# Patient Record
Sex: MALE | Race: WHITE | NOT HISPANIC OR LATINO | Employment: FULL TIME | URBAN - METROPOLITAN AREA
[De-identification: names, ages, dates, MRNs, and addresses within clinical notes are randomized per-mention and may not be internally consistent; named-entity substitution may affect disease eponyms.]

---

## 2017-01-10 ENCOUNTER — HOSPITAL ENCOUNTER (EMERGENCY)
Facility: HOSPITAL | Age: 42
Discharge: HOME | End: 2017-01-10
Payer: COMMERCIAL

## 2017-01-10 ENCOUNTER — APPOINTMENT (EMERGENCY)
Dept: RADIOLOGY | Facility: HOSPITAL | Age: 42
End: 2017-01-10
Payer: COMMERCIAL

## 2017-01-10 VITALS
DIASTOLIC BLOOD PRESSURE: 72 MMHG | TEMPERATURE: 98.3 F | SYSTOLIC BLOOD PRESSURE: 128 MMHG | HEART RATE: 76 BPM | RESPIRATION RATE: 17 BRPM | OXYGEN SATURATION: 96 %

## 2017-01-10 DIAGNOSIS — J20.9 ACUTE BRONCHITIS: Primary | ICD-10-CM

## 2017-01-10 PROCEDURE — 99283 EMERGENCY DEPT VISIT LOW MDM: CPT

## 2017-01-10 PROCEDURE — 94640 AIRWAY INHALATION TREATMENT: CPT

## 2017-01-10 PROCEDURE — 71020 HB CHEST X-RAY 2VW FRONTAL&LATL: CPT

## 2017-01-10 RX ORDER — FLUTICASONE PROPIONATE 50 MCG
1 SPRAY, SUSPENSION (ML) NASAL DAILY
Qty: 1 BOTTLE | Refills: 0 | Status: SHIPPED | OUTPATIENT
Start: 2017-01-10 | End: 2019-01-02

## 2017-01-10 RX ORDER — PREDNISONE 20 MG/1
40 TABLET ORAL DAILY
Qty: 8 TABLET | Refills: 0 | Status: SHIPPED | OUTPATIENT
Start: 2017-01-10 | End: 2017-02-09

## 2017-01-10 RX ORDER — PREDNISONE 20 MG/1
60 TABLET ORAL ONCE
Status: COMPLETED | OUTPATIENT
Start: 2017-01-10 | End: 2017-01-10

## 2017-01-10 RX ORDER — IPRATROPIUM BROMIDE AND ALBUTEROL SULFATE 2.5; .5 MG/3ML; MG/3ML
3 SOLUTION RESPIRATORY (INHALATION)
Status: DISCONTINUED | OUTPATIENT
Start: 2017-01-10 | End: 2017-01-10 | Stop reason: SDUPTHER

## 2017-01-10 RX ORDER — AZITHROMYCIN 250 MG/1
TABLET, FILM COATED ORAL
Qty: 6 TABLET | Refills: 0 | Status: SHIPPED | OUTPATIENT
Start: 2017-01-10 | End: 2019-01-02

## 2017-01-10 RX ADMIN — IPRATROPIUM BROMIDE 0.5 MG: 0.5 SOLUTION RESPIRATORY (INHALATION) at 21:40

## 2017-01-10 RX ADMIN — ALBUTEROL SULFATE 2.5 MG: 2.5 SOLUTION RESPIRATORY (INHALATION) at 21:40

## 2017-01-10 RX ADMIN — PREDNISONE 60 MG: 20 TABLET ORAL at 21:30

## 2017-04-03 ENCOUNTER — ALLSCRIPTS OFFICE VISIT (OUTPATIENT)
Dept: OTHER | Facility: OTHER | Age: 42
End: 2017-04-03

## 2017-04-18 ENCOUNTER — ALLSCRIPTS OFFICE VISIT (OUTPATIENT)
Dept: OTHER | Facility: OTHER | Age: 42
End: 2017-04-18

## 2017-04-18 DIAGNOSIS — N52.9 MALE ERECTILE DYSFUNCTION: ICD-10-CM

## 2017-04-18 DIAGNOSIS — R07.9 CHEST PAIN: ICD-10-CM

## 2017-04-18 DIAGNOSIS — R35.0 FREQUENCY OF MICTURITION: ICD-10-CM

## 2017-04-18 DIAGNOSIS — Z00.00 ENCOUNTER FOR GENERAL ADULT MEDICAL EXAMINATION WITHOUT ABNORMAL FINDINGS: ICD-10-CM

## 2017-04-27 ENCOUNTER — LAB CONVERSION - ENCOUNTER (OUTPATIENT)
Dept: OTHER | Facility: OTHER | Age: 42
End: 2017-04-27

## 2017-04-27 ENCOUNTER — HOSPITAL ENCOUNTER (OUTPATIENT)
Dept: NON INVASIVE DIAGNOSTICS | Facility: CLINIC | Age: 42
Discharge: HOME | End: 2017-04-27
Payer: COMMERCIAL

## 2017-04-27 ENCOUNTER — GENERIC CONVERSION - ENCOUNTER (OUTPATIENT)
Dept: OTHER | Facility: OTHER | Age: 42
End: 2017-04-27

## 2017-04-27 ENCOUNTER — TRANSCRIBE ORDERS (OUTPATIENT)
Dept: ADMINISTRATIVE | Facility: HOSPITAL | Age: 42
End: 2017-04-27

## 2017-04-27 DIAGNOSIS — R07.9 CHEST PAIN: ICD-10-CM

## 2017-04-27 DIAGNOSIS — R07.9 CHEST PAIN, UNSPECIFIED TYPE: Primary | ICD-10-CM

## 2017-04-27 LAB
A/G RATIO (HISTORICAL): 1.9 (CALC) (ref 1–2.5)
ALBUMIN SERPL BCP-MCNC: 4.6 G/DL (ref 3.6–5.1)
ALP SERPL-CCNC: 56 U/L (ref 40–115)
ALT SERPL W P-5'-P-CCNC: 22 U/L (ref 9–46)
ARRHY DURING EX: NORMAL
AST SERPL W P-5'-P-CCNC: 16 U/L (ref 10–40)
BACTERIA UR QL AUTO: NORMAL /HPF
BASOPHILS # BLD AUTO: 0.4 %
BASOPHILS # BLD AUTO: 22 CELLS/UL (ref 0–200)
BILIRUB SERPL-MCNC: 0.7 MG/DL (ref 0.2–1.2)
BILIRUB UR QL STRIP: NEGATIVE
BUN SERPL-MCNC: 21 MG/DL (ref 7–25)
BUN/CREA RATIO (HISTORICAL): NORMAL (CALC) (ref 6–22)
CALCIUM SERPL-MCNC: 9.7 MG/DL (ref 8.6–10.3)
CHEST PAIN STATEMENT: NORMAL
CHLORIDE SERPL-SCNC: 106 MMOL/L (ref 98–110)
CHOLEST SERPL-MCNC: 250 MG/DL (ref 125–200)
CHOLEST/HDLC SERPL: 7.8 (CALC)
CO2 SERPL-SCNC: 29 MMOL/L (ref 20–31)
COLOR UR: YELLOW
COMMENT (HISTORICAL): CLEAR
CREAT SERPL-MCNC: 1.15 MG/DL (ref 0.6–1.35)
CULTURE RESULT (HISTORICAL): NORMAL
DEPRECATED RDW RBC AUTO: 14.4 % (ref 11–15)
EGFR AFRICAN AMERICAN (HISTORICAL): 90 ML/MIN/1.73M2
EGFR-AMERICAN CALC (HISTORICAL): 78 ML/MIN/1.73M2
EOSINOPHIL # BLD AUTO: 237 CELLS/UL (ref 15–500)
EOSINOPHIL # BLD AUTO: 4.3 %
FECAL OCCULT BLOOD DIAGNOSTIC (HISTORICAL): NEGATIVE
GAMMA GLOBULIN (HISTORICAL): 2.4 G/DL (CALC) (ref 1.9–3.7)
GLUCOSE (HISTORICAL): 97 MG/DL (ref 65–99)
GLUCOSE (HISTORICAL): NEGATIVE
HCT VFR BLD AUTO: 45.7 % (ref 38.5–50)
HDLC SERPL-MCNC: 32 MG/DL
HGB BLD-MCNC: 15.3 G/DL (ref 13.2–17.1)
HYALINE CASTS #/AREA URNS LPF: NORMAL /LPF
KETONES UR STRIP-MCNC: NEGATIVE MG/DL
LDL CHOLESTEROL (HISTORICAL): 166 MG/DL (CALC)
LEUKOCYTE ESTERASE UR QL STRIP: NEGATIVE
LYMPHOCYTES # BLD AUTO: 2167 CELLS/UL (ref 850–3900)
LYMPHOCYTES # BLD AUTO: 39.4 %
MAX DIASTOLIC BP: 54 MMHG
MAX HEART RATE: 151 BPM
MAX PREDICTED HEART RATE: 177 BPM
MAX. SYSTOLIC BP: 206 MMHG
MCH RBC QN AUTO: 31.7 PG (ref 27–33)
MCHC RBC AUTO-ENTMCNC: 33.5 G/DL (ref 32–36)
MCV RBC AUTO: 94.7 FL (ref 80–100)
MONOCYTES # BLD AUTO: 292 CELLS/UL (ref 200–950)
MONOCYTES (HISTORICAL): 5.3 %
NEUTROPHILS # BLD AUTO: 2783 CELLS/UL (ref 1500–7800)
NEUTROPHILS # BLD AUTO: 50.6 %
NITRITE UR QL STRIP: NEGATIVE
NON-HDL-CHOL (CHOL-HDL) (HISTORICAL): 218 MG/DL (CALC)
PH UR STRIP.AUTO: 6 [PH] (ref 5–8)
PLATELET # BLD AUTO: 198 THOUSAND/UL (ref 140–400)
PMV BLD AUTO: 9.3 FL (ref 7.5–12.5)
POTASSIUM SERPL-SCNC: 4.4 MMOL/L (ref 3.5–5.3)
PROSTATE SPECIFIC ANTIGEN FREE (HISTORICAL): 0.1 NG/ML
PROSTATE SPECIFIC ANTIGEN PERCENT FREE (HISTORICAL): 25 % (CALC)
PROSTATE SPECIFIC ANTIGEN TOTAL (HISTORICAL): 0.4 NG/ML
PROT UR STRIP-MCNC: NEGATIVE MG/DL
PROTOCOL NAME: NORMAL
RBC # BLD AUTO: 4.82 MILLION/UL (ref 4.2–5.8)
RBC (HISTORICAL): NORMAL /HPF
REASON FOR TERMINATION: NORMAL
SODIUM SERPL-SCNC: 141 MMOL/L (ref 135–146)
SP GR UR STRIP.AUTO: 1.02 (ref 1–1.03)
SQUAMOUS EPITHELIAL CELLS (HISTORICAL): NORMAL /HPF
TARGET HR FORMULA: NORMAL
TEST INDICATION: NORMAL
TIME IN EXERCISE PHASE: 685 S
TOTAL PROTEIN (HISTORICAL): 7 G/DL (ref 6.1–8.1)
TRIGL SERPL-MCNC: 260 MG/DL
TSH SERPL DL<=0.05 MIU/L-ACNC: 2.54 MIU/L (ref 0.4–4.5)
WBC # BLD AUTO: 5.5 THOUSAND/UL (ref 3.8–10.8)
WBC # BLD AUTO: NORMAL /HPF

## 2017-04-27 PROCEDURE — 93017 CV STRESS TEST TRACING ONLY: CPT

## 2017-05-01 ENCOUNTER — ALLSCRIPTS OFFICE VISIT (OUTPATIENT)
Dept: OTHER | Facility: OTHER | Age: 42
End: 2017-05-01

## 2017-06-01 DIAGNOSIS — E78.5 HYPERLIPIDEMIA: ICD-10-CM

## 2017-08-01 DIAGNOSIS — E78.5 HYPERLIPIDEMIA: ICD-10-CM

## 2018-01-11 NOTE — RESULT NOTES
Verified Results  STRESS TEST ONLY, EXERCISE [id number] 09:50AM Gerardo Bruno Order Number: [id number]    - Patient Instructions: To schedule this appointment, please contact Central Scheduling at [phone]  Test Name Result Flag Reference   STRESS TEST ONLY, EXERCISE (Report)     [facility] [address]   [phone]     Stress Electrocardiography during Exercise     Name: Annie Cullen   MR #: [id number]   Account #: [de-identified]   Study date: 2017   : 1975   Age: 35 years   Gender: Male   Height: 71 in   Weight: 205 lb   BSA: 2 13 m squared     Allergies: PENICILLINS     RN: Uyen Lazo RN   Primary Physician: Mike Walsh MD   Referring Physician: TAI Godinez   Group: [facility]   Report Prepared By[de-identified] Eusebia Joshua   Technician: VINICIO Mcpherson   Technician: Eusebia Joshua   Interpreting Physician: Penelope Kocher , MD     CLINICAL QUESTION: Detection of coronary artery disease  HISTORY: The patient is a 35year old  male  Chest pain status: chest pain  Coronary artery disease risk factors: smoking,sob,asthma,obesity,fatigue  Co-morbidity: obesity  PROCEDURE: Treadmill exercise testing was performed, using the Sourav protocol  Stress electrocardiographic evaluation was performed  SOURAV PROTOCOL:   HR bpm SBP mmHg DBP mmHg Symptoms   Baseline 68 140 80 none   Stage 1 91 150 80 --   Stage 2 102 158 72 --   Stage 3 118 186 80 --   Stage 4 151 206 54 fatigue   Immediate 151 206 54 fatigue   Recovery 1 95 150 60 none   Recovery 2 83 120 70 --   Pre 02 Sat 97% and Peak 97%    No medications or fluids given  STRESS SUMMARY: Duration of exercise was 11 min and 25 sec  The patient exercised to protocol stage 4  Maximal work rate was 13 7 METs  Maximal heart rate during stress was 151 bpm ( 85 % of maximal predicted heart rate)   The rate-pressure product for the peak heart rate and blood pressure was 36529  There was no chest pain during stress  The stress test was terminated due to fatigue  The stress ECG was negative for ischemia  There were no stress arrhythmias or conduction   abnormalities  SUMMARY:   - Stress results: Duration of exercise was 11 min and 25 sec  Target heart rate was achieved  There was no chest pain during stress  - ECG conclusions: The stress ECG was negative for ischemia  IMPRESSIONS: Normal study  Prepared and signed by     Marcelo Rivera MD   Signed 04/27/2017 11:19:26     (1) COMPREHENSIVE METABOLIC PANEL [id number] 18:73EH Irma Bruno Pel     Test Name Result Flag Reference   GLUCOSE 97 mg/dL  65-99   Fasting reference interval   UREA NITROGEN (BUN) 21 mg/dL  7-25   CREATININE 1 15 mg/dL  0 60-1 35   eGFR NON-AFR   AMERICAN 78 mL/min/1 73m2  > OR = 60   eGFR AFRICAN AMERICAN 90 mL/min/1 73m2  > OR = 60   BUN/CREATININE RATIO   5-50   NOT APPLICABLE (calc)   SODIUM 141 mmol/L  135-146   POTASSIUM 4 4 mmol/L  3 5-5 3   CHLORIDE 106 mmol/L     CARBON DIOXIDE 29 mmol/L  20-31   CALCIUM 9 7 mg/dL  8 6-10 3   PROTEIN, TOTAL 7 0 g/dL  6 1-8 1   ALBUMIN 4 6 g/dL  3 6-5 1   GLOBULIN 2 4 g/dL (calc)  1 9-3 7   ALBUMIN/GLOBULIN RATIO 1 9 (calc)  1 0-2 5   BILIRUBIN, TOTAL 0 7 mg/dL  0 2-1 2   ALKALINE PHOSPHATASE 56 U/L     AST 16 U/L  10-40   ALT 22 U/L  9-46     (1) CBC/PLT/DIFF 26Apr2017 06:38AM Irma Bruno Pel     Test Name Result Flag Reference   WHITE BLOOD CELL COUNT 5 5 Thousand/uL  3 8-10 8   RED BLOOD CELL COUNT 4 82 Million/uL  4 20-5 80   HEMOGLOBIN 15 3 g/dL  13 2-17 1   HEMATOCRIT 45 7 %  38 5-50 0   MCV 94 7 fL  80 0-100 0   MCH 31 7 pg  27 0-33 0   MCHC 33 5 g/dL  32 0-36 0   RDW 14 4 %  11 0-15 0   PLATELET COUNT 443 Thousand/uL  140-400   ABSOLUTE NEUTROPHILS 2783 cells/uL  2805-9237   ABSOLUTE LYMPHOCYTES 2167 cells/uL  850-3900   ABSOLUTE MONOCYTES 292 cells/uL  200-950   ABSOLUTE EOSINOPHILS 237 cells/uL     ABSOLUTE BASOPHILS 22 cells/uL  0-200   NEUTROPHILS 50 6 %     LYMPHOCYTES 39 4 %     MONOCYTES 5 3 %     EOSINOPHILS 4 3 %     BASOPHILS 0 4 %     MPV 9 3 fL  7 5-12 5     (1) URINALYSIS w URINE C/S REFLEX (will reflex a microscopy if leukocytes, occult blood, or nitrites are not within normal limits) 26Apr2017 06:38AM Noel Bruno     Test Name Result Flag Reference   SPECIFIC GRAVITY 1 020  1 001-1 035   BILIRUBIN NEGATIVE  NEGATIVE   GLUCOSE NEGATIVE  NEGATIVE   COLOR YELLOW  YELLOW   APPEARANCE CLEAR  CLEAR   PH 6 0  5 0-8 0   KETONES NEGATIVE  NEGATIVE   OCCULT BLOOD NEGATIVE  NEGATIVE   PROTEIN NEGATIVE  NEGATIVE   SQUAMOUS EPITHELIAL CELLS NONE SEEN /HPF  < OR = 5   HYALINE CAST NONE SEEN /LPF  NONE SEEN   REFLEXIVE URINE CULTURE NO CULTURE INDICATED     RBC NONE SEEN /HPF  < OR = 2   NITRITE NEGATIVE  NEGATIVE   LEUKOCYTE ESTERASE NEGATIVE  NEGATIVE   WBC NONE SEEN /HPF  < OR = 5   BACTERIA NONE SEEN /HPF  NONE SEEN     (Q) LIPID PANEL WITH REFLEX TO DIRECT LDL 26Apr2017 06:38AM Noel Bruno     Test Name Result Flag Reference   CHOLESTEROL, TOTAL 250 mg/dL H 125-200   HDL CHOLESTEROL 32 mg/dL L > OR = 40   TRIGLICERIDES 706 mg/dL H <150   LDL-CHOLESTEROL 166 mg/dL (calc) H <130   Desirable range <100 mg/dL for patients with CHD or  diabetes and <70 mg/dL for diabetic patients with  known heart disease  CHOL/HDLC RATIO 7 8 (calc) H < OR = 5 0   NON HDL CHOLESTEROL 218 mg/dL (calc) H    Target for non-HDL cholesterol is 30 mg/dL higher than   LDL cholesterol target       (Q) TSH, 3RD GENERATION W/REFLEX TO FT4 26Apr2017 06:38AM Noel Bruno     Test Name Result Flag Reference   TSH W/REFLEX TO FT4 2 54 mIU/L  0 40-4 50     (Q) PSA (FREE AND TOTAL) 26Apr2017 06:38AM Noel Bruno   REPORT COMMENT:  FASTING:YES     Test Name Result Flag Reference   TOTAL PSA 0 4 ng/mL  < OR = 4 0   FREE PSA 0 1 ng/mL     % FREE PSA 25 % (calc) L >25 PSA(ng/mL)      Free PSA(%)     Estimated(x) Probability                                       of Cancer(as%)  0-2 5              (*)               Approx  1  2 6-4 0(1)         0-27(2)                   24(3)  4 1-10(4)          0-10                      56                     11-15                     28                     16-20                     20                     21-25                     16                     >or =26                   8  >10(+)             N/A                      >50     References:(1)Christin et al :Urology 60: 469-474 (2002)             (2)Christin et al :J Urol 168: 922-925 (2002)                Free PSA(%)   Sensitivity(%)  Specificity(%)                < or = 25          85              19                < or = 30          93               9             (3)Christin et al :: 4418-4507 (1997)             (4)Catalona et al :: 4101-3084 (1998)     (x)These estimates vary with age, ethnicity, family      history and ISIAH results   (*)The diagnostic usefulness of % Free PSA has not been     established in patients with total PSA below 2 6 ng/mL  (+)In men with PSA above 10 ng/mL, prostate cancer risk is     determined by total PSA alone  PSA was performed using the [employer] [place]  Immunoassay method  Values obtained from different  assay methods cannot be used interchangeably  PSA  levels, regardless of value, should not be interpreted  as absolute evidence of the presence or absence of  disease

## 2018-01-13 VITALS
TEMPERATURE: 97.9 F | RESPIRATION RATE: 16 BRPM | SYSTOLIC BLOOD PRESSURE: 122 MMHG | HEART RATE: 80 BPM | DIASTOLIC BLOOD PRESSURE: 78 MMHG | HEIGHT: 72 IN | BODY MASS INDEX: 27.5 KG/M2 | WEIGHT: 203 LBS

## 2018-01-14 VITALS
SYSTOLIC BLOOD PRESSURE: 124 MMHG | DIASTOLIC BLOOD PRESSURE: 80 MMHG | BODY MASS INDEX: 27.5 KG/M2 | HEIGHT: 72 IN | RESPIRATION RATE: 18 BRPM | WEIGHT: 203 LBS | HEART RATE: 74 BPM

## 2018-01-18 NOTE — PROGRESS NOTES
Assessment    1  Encounter for preventive health examination (V70 0) (Z00 00)   2  Chest pain (786 50) (R07 9)   3  Erectile dysfunction (607 84) (N52 9)   4  Urinary frequency (788 41) (R35 0)    Plan   Chest pain    · (1) CBC/PLT/DIFF; Status:Active; Requested for:18Apr2017;    · (1) LIPID PANEL FASTING W DIRECT LDL REFLEX; Status:Active; Requested  for:18Apr2017;    · (1) TSH WITH FT4 REFLEX; Status:Active; Requested for:18Apr2017;    · ECHO COMPLETE WITH CONTRAST IF INDICATED; Status:Need Information -  Financial Authorization; Requested for:18Apr2017;    · EKG/ECG- POC; Status:Active - Perform Order; Requested for:18Apr2017;   Chest pain, Erectile dysfunction, Urinary frequency    · Follow-up visit in 2 weeks Evaluation and Treatment  Follow-up  Status: Hold For -  Scheduling  Requested for: 18Apr2017  Chest pain, Health Maintenance    · (1) COMPREHENSIVE METABOLIC PANEL; Status:Active; Requested for:18Apr2017;    · Eat a low fat and low cholesterol diet ; Status:Complete;   Done: 01QGX2773  Chest pain, SocHx: Current every day smoker    · You need to quit smoking ; Status:Complete;   Done: 69MUB5085  Erectile dysfunction, Urinary frequency    · (1) PSA FREE & TOTAL; Status:Active; Requested for:18Apr2017;    · (1) URINALYSIS w URINE C/S REFLEX (will reflex a microscopy if leukocytes, occult  blood, or nitrites are not within normal limits); Status:Active; Requested for:18Apr2017;     STRESS TEST ONLY, EXERCISE; Status:Hold For - Scheduling; Requested for:18Apr2017;   Perform:[facility]; Due:18Apr2018; Ordered; For:Chest pain; Ordered By:Megan Bruno;   Dentist Follow Up Evaluation and Treatment  Follow-up  Status: Hold For - Scheduling  Requested for: 18Apr2017  Ordered; For: Health Maintenance;  Ordered By: Case Zamorano  Performed:   Due: 76WEA7020     Discussion/Summary    HM- Pt  encouraged to eat a low fat and healthy diverse diet   Pt  encouraged to continue to follow-up with the eye doctor  Pt  instructed to follow-up with the dentist  Pt  instructed to quit smoking  Chest pain- EKG and lab work ordered  Stress test and echocardiogram also ordered  Urinary frequency, erectile dysfunction- Urinalysis and PSA ordered  Will discuss treatment options at follow-up visit after testing is done  Pt  instructed to follow-up in 2 weeks or sooner as needed  Reviewed signs and symptoms that warrant going to the ER  The treatment plan was reviewed with the patient/guardian  The patient/guardian understands and agrees with the treatment plan      Chief Complaint  Pt  is here for a wellness visit  History of Present Illness  HM, Adult Male: The patient is being seen for a health maintenance evaluation  Social History: Household members include spouse  He is   Work status: working full time and occupation: [employer]  The patient is a current cigarette smoker and Pt reports that he smokes a pack a day  He Pt  reports that he has smoked since he was 13years old  He is ready to quit using tobacco  He reports occasional alcohol use  The patient has no concerns about alcohol abuse  He has never used illicit drugs  General Health: He does not have regular dental visits  The patient brushes 2 time(s) a day  He complains of vision problems  He denies hearing loss  Immunizations status:  Pt  reports that he wears his glasses for driving and reading  Pt  reports that he follows up with the eye doctor  Lifestyle:  He does not have a healthy diet  Diet problems: insufficient in fruit and high in sugar  He does not exercise regularly  Reproductive health:  the patient is sexually active  He is monogamous with a female partner  birth control is not being practiced  He complains of erectile dysfunction (Pt  reports that he is having trouble maintaining an erection  Pt  reports that he took cialis in the past and it helped)  He is interested in treatment for erectile dysfunction  Screening: Prostate cancer screening includes no previous prostate-specific antigen testing  Testicular cancer screening includes irregular self testicular examinations  Colorectal cancer screening includes no previous screening  Metabolic screening includes uncertain timing of his last lipid profile, uncertain timing of his last glucose screening and uncertain timing of his last thyroid function test      Safety elements used: seat belt, sunscreen and smoke detector  HPI: Pt  reports a few episodes of chest pain in the center of his chest over the past year  Pt  reports that it feels like a sharp sensation  Pt  reports that it lasts a couple of minutes and then goes away on its own  Pt  reports that it has occurred 3 times in the past year  Denies any SOB, palpitations, or dizziness  Pt  reports that he had cardiac testing done over 10 years ago when he collapsed at work due to dehydration and everything was normal  Denies any chest pain currently  Review of Systems    Constitutional: no fever, no chills and not feeling tired  Eyes: no eye pain, eyes not red and no purulent discharge from the eyes  ENT: no earache, no sore throat and no nasal discharge  Cardiovascular: as noted in HPI and no palpitations  Respiratory: no shortness of breath, no cough, no wheezing and no shortness of breath during exertion  Gastrointestinal: No complaints of abdominal pain, no constipation, no nausea or vomiting, no diarrhea or bloody stools  Genitourinary: urinary frequency  , but no dysuria, no urinary hesitancy, no genital lesions, no incontinence and no testicular pain  Musculoskeletal: no arthralgias and no myalgias  Integumentary: no skin lesions  Neurological: no headache, no numbness, no tingling, no dizziness, no convulsions and no fainting  Psychiatric: not suicidal and no depression  Active Problems    1  Asthma (521 90) (J45 903)   2  Inhibited sexual excitement (801 72) (F50 8)   3  Nicotine dependence (305 1) (F17 200)   4  Psoriasis (696 1) (L40 9)    Past Medical History    · History of _   · History of _   · History of Acute upper respiratory infection (465 9) (J06 9)   · History of Anxiety (300 00) (F41 9)   · History of Atypical chest pain (786 59) (R07 89)   · History of Benign essential hypertension (401 1) (I10)   · History of Depression (311) (F32 9)   · History of Disorder of jaw (526 9) (M27 9)   · History of Encounter for removal of sutures (V58 32) (Z48 02)   · History of acute bronchitis (V12 69) (Z87 09)   · History of acute bronchitis (V12 69) (Z87 09)   · History of acute conjunctivitis (V12 49) (Z86 69)   · History of acute pharyngitis (V12 69) (Z87 09)   · History of acute sinusitis (V12 69) (Z87 09)   · History of low back pain (V13 59) (Z87 39)   · History of obesity (V13 89) (Z86 39)   · History of psoriasis (V13 3) (Z87 2)   · History of sleep apnea (V13 89) (Z86 69)   · History of streptococcal infection (V12 09) (Z86 19)   · History of Leg Injury (959 7)   · History of Morbid or severe obesity due to excess calories (278 01) (E66 01)   · History of Open Wound Of The Face (873 40)   · History of Other muscle spasm (728 85) (X93 633)   · History of Puncture Wound Of Head (873 8)   · History of Visit for pre-operative examination (V72 84) (Y90 700)    Surgical History    · History of Hernia Repair    Family History  Mother    · Family history of multiple sclerosis (V17 2) (Z82 0)  Father    · No pertinent family history    Social History    · Consumes alcohol occasionally (V49 89) (Z78 9)   · Current every day smoker (305 1) (F17 200)    Current Meds   1  Clobetasol Propionate 0 05 % External Cream; APPLY SPARINGLY TO AFFECTED   AREA(S) TWICE DAILY; Therapy: 19UQN1899 to (Last Rx:03Apr2017)  Requested for: 03Apr2017 Ordered   2  Proventil  (90 Base) MCG/ACT Inhalation Aerosol Solution; 2 puff  inhale Q 4 hr   PRN;    Therapy: 67HLS7648 to (Last Rx:05Mar2013) Requested for: 64ZKY5169 Ordered    Allergies    1  Penicillins    Vitals   Recorded: 56Hwc4324 06:09PM Recorded: 04Mbh2219 05:46PM   Heart Rate  84   Respiration 18    Systolic  437   Diastolic  78   Height  6 ft    Weight  204 lb    BMI Calculated  27 67   BSA Calculated  2 15     Physical Exam    Constitutional   General appearance: No acute distress, well appearing and well nourished  Eyes   Conjunctiva and lids: No erythema, swelling or discharge  Pupils and irises: Equal, round, reactive to light  Ears, Nose, Mouth, and Throat   External inspection of ears and nose: Normal     Otoscopic examination: Tympanic membranes translucent with normal light reflex  Canals patent without erythema  Nasal mucosa, septum, and turbinates: Normal without edema or erythema  Oropharynx: Normal with no erythema, edema, exudate or lesions  Neck   Neck: Supple, symmetric, trachea midline, no masses  Thyroid: Normal, no thyromegaly  Pulmonary   Respiratory effort: No increased work of breathing or signs of respiratory distress  Auscultation of lungs: Clear to auscultation  Cardiovascular   Auscultation of heart: Normal rate and rhythm, normal S1 and S2, no murmurs  radial pulses +2 bilaterally  Pedal pulses: 2+ bilaterally  No edema noted  Abdomen   Abdomen: Non-tender, no masses  Liver and spleen: No hepatomegaly or splenomegaly  Genitourinary Pt  refuses a genital exam    Lymphatic   Palpation of lymph nodes in neck: No lymphadenopathy  Palpation of lymph nodes in groin: No lymphadenopathy  Musculoskeletal   Gait and station: Normal     Muscle strength/tone: Normal     Neurologic   Cranial nerves: Cranial nerves 2-12 intact  Coordination: Normal finger to nose and heel to shin      Psychiatric   Orientation to person, place and time: Normal     Mood and affect: Normal        Procedure    Procedure:   Results: 20/25 in both eyes without corrective device, 20/25 in the right eye without corrective device, 20/25 in the left eye without corrective device      Future Appointments    Date/Time Provider Specialty Site   05/01/2017 04:20 PM Heavenly Bruno, [facility]     Signatures   Electronically signed by : Dante Pozo [facility];  Apr 18 2017  7:06PM EST                       (Author)    Electronically signed by : GUMARO Mccain ; Apr 18 2017  7:07PM EST

## 2018-01-22 VITALS
SYSTOLIC BLOOD PRESSURE: 122 MMHG | WEIGHT: 204 LBS | HEART RATE: 84 BPM | BODY MASS INDEX: 27.63 KG/M2 | RESPIRATION RATE: 18 BRPM | DIASTOLIC BLOOD PRESSURE: 78 MMHG | HEIGHT: 72 IN

## 2018-04-27 ENCOUNTER — HOSPITAL ENCOUNTER (EMERGENCY)
Facility: HOSPITAL | Age: 43
Discharge: HOME | End: 2018-04-27
Attending: EMERGENCY MEDICINE | Admitting: EMERGENCY MEDICINE
Payer: COMMERCIAL

## 2018-04-27 ENCOUNTER — APPOINTMENT (EMERGENCY)
Dept: RADIOLOGY | Facility: HOSPITAL | Age: 43
End: 2018-04-27
Payer: COMMERCIAL

## 2018-04-27 VITALS
HEART RATE: 79 BPM | BODY MASS INDEX: 27.12 KG/M2 | TEMPERATURE: 98 F | OXYGEN SATURATION: 97 % | WEIGHT: 200 LBS | DIASTOLIC BLOOD PRESSURE: 60 MMHG | SYSTOLIC BLOOD PRESSURE: 123 MMHG | RESPIRATION RATE: 16 BRPM

## 2018-04-27 DIAGNOSIS — S80.811A ABRASION OF RIGHT LOWER EXTREMITY, INITIAL ENCOUNTER: Primary | ICD-10-CM

## 2018-04-27 PROCEDURE — 99283 EMERGENCY DEPT VISIT LOW MDM: CPT

## 2018-04-27 PROCEDURE — 73590 X-RAY EXAM OF LOWER LEG: CPT

## 2018-04-27 PROCEDURE — 73630 X-RAY EXAM OF FOOT: CPT

## 2018-04-27 PROCEDURE — 73610 X-RAY EXAM OF ANKLE: CPT

## 2018-04-27 RX ORDER — CLINDAMYCIN HYDROCHLORIDE 150 MG/1
450 CAPSULE ORAL EVERY 8 HOURS SCHEDULED
Qty: 63 CAPSULE | Refills: 0 | Status: SHIPPED | OUTPATIENT
Start: 2018-04-27 | End: 2018-05-04

## 2018-04-27 RX ORDER — BACITRACIN, NEOMYCIN, POLYMYXIN B 400; 3.5; 5 [USP'U]/G; MG/G; [USP'U]/G
1 OINTMENT TOPICAL ONCE
Status: COMPLETED | OUTPATIENT
Start: 2018-04-27 | End: 2018-04-27

## 2018-04-27 RX ADMIN — BACITRACIN, NEOMYCIN, POLYMYXIN B 1 SMALL APPLICATION: 400; 3.5; 5 OINTMENT TOPICAL at 21:13

## 2018-04-28 NOTE — ED PROVIDER NOTES
History  Chief Complaint   Patient presents with    Leg Pain     pt fell 1 foot off ladder last week  now c/o abrasion on RLE not healing properly  also c/o bruising on R foot       37 yo M who presents today for evaluation of right leg injury that occurred one week ago  Patient fell one foot off a ladder and sustained an abrasion to the anterior right shin  He is concerned about an infected abrasion  No denies any purulent drainage from the abrasion, any fevers, chills or systemic complaints  He also has noticed some bruising along his right heels and swelling in the foot  He is able to ambulate but with pain  He has no history of injury to the leg/foot  He denies history of MRSA  His Tetanus is up-to-date  Prior to Admission Medications   Prescriptions Last Dose Informant Patient Reported? Taking? albuterol (PROVENTIL HFA,VENTOLIN HFA) 90 mcg/act inhaler   No No   Sig: Inhale 2 puffs every 4 (four) hours as needed for wheezing  azithromycin (ZITHROMAX) 250 mg tablet   No No   Sig: Please take 2 tablets on day one, and 1 tablet each day on days 2-5  fluticasone (FLONASE) 50 mcg/act nasal spray   No No   Si spray into each nostril daily for 30 days      Facility-Administered Medications: None       Past Medical History:   Diagnosis Date    Asthma        History reviewed  No pertinent surgical history  History reviewed  No pertinent family history  I have reviewed and agree with the history as documented  Social History   Substance Use Topics    Smoking status: Current Some Day Smoker    Smokeless tobacco: Never Used    Alcohol use Yes        Review of Systems   Constitutional: Negative for chills and fever  Respiratory: Negative for shortness of breath  Cardiovascular: Negative for chest pain  Musculoskeletal: Positive for arthralgias and gait problem  Negative for joint swelling  Skin: Positive for color change and wound     Neurological: Negative for weakness and numbness  Physical Exam  ED Triage Vitals [04/27/18 1857]   Temperature Pulse Respirations Blood Pressure SpO2   98 °F (36 7 °C) 79 16 123/60 97 %      Temp Source Heart Rate Source Patient Position - Orthostatic VS BP Location FiO2 (%)   Oral -- -- -- --      Pain Score       2           Orthostatic Vital Signs  Vitals:    04/27/18 1857   BP: 123/60   Pulse: 79       Physical Exam   Constitutional: He is oriented to person, place, and time  He appears well-developed and well-nourished  Non-toxic appearance  He does not have a sickly appearance  He does not appear ill  No distress  HENT:   Head: Normocephalic and atraumatic  Right Ear: External ear normal    Left Ear: External ear normal    Nose: Nose normal    Mouth/Throat: Oropharynx is clear and moist    Eyes: Conjunctivae and EOM are normal  Pupils are equal, round, and reactive to light  Neck: Normal range of motion  Neck supple  Cardiovascular: Normal rate, regular rhythm and normal heart sounds  Exam reveals no gallop and no friction rub  No murmur heard  Pulmonary/Chest: Effort normal and breath sounds normal  No respiratory distress  He has no decreased breath sounds  He has no wheezes  He has no rhonchi  He has no rales  Musculoskeletal: Normal range of motion  Right knee: Normal         Right ankle: He exhibits swelling and ecchymosis (mild, ankle)  He exhibits normal range of motion, no deformity, no laceration and normal pulse  Tenderness  Lateral malleolus tenderness found  No medial malleolus tenderness found  Achilles tendon exhibits no pain and no defect  Right lower leg: He exhibits tenderness, bony tenderness and swelling  He exhibits no edema, no deformity and no laceration  Legs:       Right foot: Normal         Feet:    Neurological: He is alert and oriented to person, place, and time  Skin: Skin is warm and dry  Capillary refill takes less than 2 seconds  No rash noted  He is not diaphoretic  Psychiatric: He has a normal mood and affect  Nursing note and vitals reviewed  ED Medications  Medications   neomycin-bacitracin-polymyxin b (NEOSPORIN) ointment 1 small application (1 small application Topical Given 4/27/18 2113)       Diagnostic Studies  Results Reviewed     None                 XR foot 3+ views RIGHT   ED Interpretation by Donney Dakin, PA-C (04/27 2052)   No fracture appreciated      XR ankle 3+ views RIGHT   ED Interpretation by Donney Dakin, PA-C (04/27 2052)   No fracture appreciated      XR tibia fibula 2 views RIGHT   ED Interpretation by Donney Dakin, PA-C (04/27 2052)   No fracture appreciated                 Procedures  Procedures       Phone Contacts  ED Phone Contact    ED Course                               MDM  Number of Diagnoses or Management Options  Abrasion of right lower extremity, initial encounter: new and requires workup  Diagnosis management comments:   39 yo M with RLE injury and abrasion that occurred one week ago  Xray no acute fractures  Wound was copiously irrigated and cleansed, neosporin and dressing was applied  Patient was instructed to f/u with urgent care in 2 days for wound recheck  D/c home with clinda d/t PCN allergy  RTED precautions given  Patient verbalizes understanding and agrees with plan         Amount and/or Complexity of Data Reviewed  Tests in the radiology section of CPT®: ordered and reviewed  Independent visualization of images, tracings, or specimens: yes    Patient Progress  Patient progress: stable    CritCare Time    Disposition  Final diagnoses:   Abrasion of right lower extremity, initial encounter     Time reflects when diagnosis was documented in both MDM as applicable and the Disposition within this note     Time User Action Codes Description Comment    4/27/2018  9:07 PM Carlos Cornejo Add [I62 [id number]] Abrasion of right lower extremity, initial encounter       ED Disposition     ED Disposition Condition Comment Discharge  Pearl Pen discharge to home/self care  Condition at discharge: Good        Follow-up Information     Follow up With Specialties Details Why Contact Info Additional Information    St  [address] [facility] Go in 2 days For wound re-check [address], [address], [place], [place], [address] [facility]  If symptoms worsen [address] [facility]  AN ED, [address], [place], [place], [address]        Discharge Medication List as of 4/27/2018  9:10 PM      START taking these medications    Details   clindamycin (CLEOCIN) 150 mg capsule Take 3 capsules (450 mg total) by mouth every 8 (eight) hours for 7 days, Starting Fri 4/27/2018, Until Fri 5/4/2018, Print         CONTINUE these medications which have NOT CHANGED    Details   albuterol (PROVENTIL HFA,VENTOLIN HFA) 90 mcg/act inhaler Inhale 2 puffs every 4 (four) hours as needed for wheezing , Starting 9/19/2016, Until Discontinued, Print      azithromycin (ZITHROMAX) 250 mg tablet Please take 2 tablets on day one, and 1 tablet each day on days 2-5 , Print      fluticasone (FLONASE) 50 mcg/act nasal spray 1 spray into each nostril daily for 30 days, Starting 1/10/2017, Until Thu 2/9/17, Print           No discharge procedures on file      ED Provider  Electronically Signed by           Deidra Barros PA-C  04/27/18 6057

## 2018-04-28 NOTE — DISCHARGE INSTRUCTIONS
Abrasion   WHAT YOU NEED TO KNOW:   An abrasion is a scrape on your skin  It happens when your skin rubs against a rough surface  Some examples of an abrasion include rug burn, a skinned elbow, or road rash  Abrasions can be many shapes and sizes  The wound may hurt, bleed, bruise, or swell  DISCHARGE INSTRUCTIONS:   Return to the emergency department if:   · The bleeding does not stop after 10 minutes of firm pressure  · You cannot rinse one or more foreign objects out of your wound  · You have red streaks on your skin coming from your wound  Contact your healthcare provider if:   · You have a fever or chills  · Your abrasion is red, warm, swollen, or draining pus  · You have questions or concerns about your condition or care  Care for your abrasion:   · Wash your hands and dry them with a clean towel  · Press a clean cloth against your wound to stop any bleeding  · Rinse your wound with a lot of clean water  Do not use harsh soap, alcohol, or iodine solutions  · Use a clean, wet cloth to remove any objects, such as small pieces of rocks or dirt  · Rub antibiotic ointment on your wound  This may help prevent infection and help your wound heal     · Cover the wound with a non-stick bandage  Change the bandage daily, and if gets wet or dirty  Follow up with your healthcare provider as directed:  Write down your questions so you remember to ask them during your visits  © 2017 [address] Information is for End User's use only and may not be sold, redistributed or otherwise used for commercial purposes  All illustrations and images included in CareNotes® are the copyrighted property of [employer]  or Vicente Karimi  The above information is an  only  It is not intended as medical advice for individual conditions or treatments   Talk to your doctor, nurse or pharmacist before following any medical regimen to see if it is safe and effective for you

## 2019-01-02 ENCOUNTER — HOSPITAL ENCOUNTER (EMERGENCY)
Facility: HOSPITAL | Age: 44
Discharge: HOME | End: 2019-01-02
Attending: EMERGENCY MEDICINE | Admitting: EMERGENCY MEDICINE
Payer: COMMERCIAL

## 2019-01-02 ENCOUNTER — APPOINTMENT (EMERGENCY)
Dept: CT IMAGING | Facility: HOSPITAL | Age: 44
End: 2019-01-02
Payer: COMMERCIAL

## 2019-01-02 ENCOUNTER — APPOINTMENT (EMERGENCY)
Dept: RADIOLOGY | Facility: HOSPITAL | Age: 44
End: 2019-01-02
Payer: COMMERCIAL

## 2019-01-02 VITALS
BODY MASS INDEX: 28.86 KG/M2 | SYSTOLIC BLOOD PRESSURE: 142 MMHG | TEMPERATURE: 98 F | HEART RATE: 75 BPM | RESPIRATION RATE: 18 BRPM | OXYGEN SATURATION: 96 % | WEIGHT: 206.13 LBS | HEIGHT: 71 IN | DIASTOLIC BLOOD PRESSURE: 78 MMHG

## 2019-01-02 DIAGNOSIS — R42 LIGHTHEADEDNESS: Primary | ICD-10-CM

## 2019-01-02 LAB
ALBUMIN SERPL BCP-MCNC: 4.1 G/DL (ref 3.5–5)
ALP SERPL-CCNC: 59 U/L (ref 46–116)
ALT SERPL W P-5'-P-CCNC: 68 U/L (ref 12–78)
ANION GAP SERPL CALCULATED.3IONS-SCNC: 10 MMOL/L (ref 4–13)
AST SERPL W P-5'-P-CCNC: 26 U/L (ref 5–45)
BASOPHILS # BLD AUTO: 0.06 THOUSANDS/ΜL (ref 0–0.1)
BASOPHILS NFR BLD AUTO: 1 % (ref 0–1)
BILIRUB SERPL-MCNC: 0.7 MG/DL (ref 0.2–1)
BILIRUB UR QL STRIP: NEGATIVE
BUN SERPL-MCNC: 18 MG/DL (ref 5–25)
CALCIUM SERPL-MCNC: 9.2 MG/DL (ref 8.3–10.1)
CHLORIDE SERPL-SCNC: 103 MMOL/L (ref 100–108)
CLARITY UR: CLEAR
CO2 SERPL-SCNC: 27 MMOL/L (ref 21–32)
COLOR UR: NORMAL
CREAT SERPL-MCNC: 0.88 MG/DL (ref 0.6–1.3)
EOSINOPHIL # BLD AUTO: 0.12 THOUSAND/ΜL (ref 0–0.61)
EOSINOPHIL NFR BLD AUTO: 2 % (ref 0–6)
ERYTHROCYTE [DISTWIDTH] IN BLOOD BY AUTOMATED COUNT: 12.7 % (ref 11.6–15.1)
GFR SERPL CREATININE-BSD FRML MDRD: 104 ML/MIN/1.73SQ M
GLUCOSE SERPL-MCNC: 100 MG/DL (ref 65–140)
GLUCOSE UR STRIP-MCNC: NEGATIVE MG/DL
HCT VFR BLD AUTO: 43.6 % (ref 36.5–49.3)
HGB BLD-MCNC: 15.3 G/DL (ref 12–17)
HGB UR QL STRIP.AUTO: NEGATIVE
IMM GRANULOCYTES # BLD AUTO: 0.02 THOUSAND/UL (ref 0–0.2)
IMM GRANULOCYTES NFR BLD AUTO: 0 % (ref 0–2)
KETONES UR STRIP-MCNC: NEGATIVE MG/DL
LEUKOCYTE ESTERASE UR QL STRIP: NEGATIVE
LYMPHOCYTES # BLD AUTO: 2.44 THOUSANDS/ΜL (ref 0.6–4.47)
LYMPHOCYTES NFR BLD AUTO: 35 % (ref 14–44)
MCH RBC QN AUTO: 33.1 PG (ref 26.8–34.3)
MCHC RBC AUTO-ENTMCNC: 35.1 G/DL (ref 31.4–37.4)
MCV RBC AUTO: 94 FL (ref 82–98)
MONOCYTES # BLD AUTO: 0.36 THOUSAND/ΜL (ref 0.17–1.22)
MONOCYTES NFR BLD AUTO: 5 % (ref 4–12)
NEUTROPHILS # BLD AUTO: 4.04 THOUSANDS/ΜL (ref 1.85–7.62)
NEUTS SEG NFR BLD AUTO: 57 % (ref 43–75)
NITRITE UR QL STRIP: NEGATIVE
NRBC BLD AUTO-RTO: 0 /100 WBCS
PH UR STRIP.AUTO: 7 [PH] (ref 4.5–8)
PLATELET # BLD AUTO: 236 THOUSANDS/UL (ref 149–390)
PMV BLD AUTO: 10.2 FL (ref 8.9–12.7)
POTASSIUM SERPL-SCNC: 3.8 MMOL/L (ref 3.5–5.3)
PROT SERPL-MCNC: 7.6 G/DL (ref 6.4–8.2)
PROT UR STRIP-MCNC: NEGATIVE MG/DL
RBC # BLD AUTO: 4.62 MILLION/UL (ref 3.88–5.62)
SODIUM SERPL-SCNC: 140 MMOL/L (ref 136–145)
SP GR UR STRIP.AUTO: <=1.005 (ref 1–1.03)
TROPONIN I SERPL-MCNC: <0.02 NG/ML
TROPONIN I SERPL-MCNC: <0.02 NG/ML
UROBILINOGEN UR QL STRIP.AUTO: 0.2 E.U./DL
WBC # BLD AUTO: 7.04 THOUSAND/UL (ref 4.31–10.16)

## 2019-01-02 PROCEDURE — 36415 COLL VENOUS BLD VENIPUNCTURE: CPT | Performed by: EMERGENCY MEDICINE

## 2019-01-02 PROCEDURE — 71046 X-RAY EXAM CHEST 2 VIEWS: CPT

## 2019-01-02 PROCEDURE — 70450 CT HEAD/BRAIN W/O DYE: CPT

## 2019-01-02 PROCEDURE — 81003 URINALYSIS AUTO W/O SCOPE: CPT | Performed by: EMERGENCY MEDICINE

## 2019-01-02 PROCEDURE — 93005 ELECTROCARDIOGRAM TRACING: CPT

## 2019-01-02 PROCEDURE — 85025 COMPLETE CBC W/AUTO DIFF WBC: CPT | Performed by: EMERGENCY MEDICINE

## 2019-01-02 PROCEDURE — 99285 EMERGENCY DEPT VISIT HI MDM: CPT

## 2019-01-02 PROCEDURE — 84484 ASSAY OF TROPONIN QUANT: CPT | Performed by: EMERGENCY MEDICINE

## 2019-01-02 PROCEDURE — 80053 COMPREHEN METABOLIC PANEL: CPT | Performed by: EMERGENCY MEDICINE

## 2019-01-02 NOTE — DISCHARGE INSTRUCTIONS
Dizziness   WHAT YOU NEED TO KNOW:   Dizziness is a feeling of being off balance or unsteady  Common causes of dizziness are an inner ear fluid imbalance or a lack of oxygen in your blood  Dizziness may be acute (lasts 3 days or less) or chronic (lasts longer than 3 days)  You may have dizzy spells that last from seconds to a few hours  DISCHARGE INSTRUCTIONS:   Return to the emergency department if:   · You have a headache and a stiff neck  · You have shaking chills and a fever  · You vomit over and over with no relief  · Your vomit or bowel movements are red or black  · You have pain in your chest, back, or abdomen  · You have numbness, especially in your face, arms, or legs  · You have trouble moving your arms or legs  · You are confused  Contact your healthcare provider if:   · You have a fever  · Your symptoms do not get better with treatment  · You have questions or concerns about your condition or care  Manage your symptoms:   · Do not drive  or operate heavy machinery when you are dizzy  · Get up slowly  from sitting or lying down  · Drink plenty of liquids  Liquids help prevent dehydration  Ask how much liquid to drink each day and which liquids are best for you  Follow up with your healthcare provider as directed:  Write down your questions so you remember to ask them during your visits  © 2017 [facility] Information is for End User's use only and may not be sold, redistributed or otherwise used for commercial purposes  All illustrations and images included in CareNotes® are the copyrighted property of A D A M [employer] Inc  or Vicente Karimi  The above information is an  only  It is not intended as medical advice for individual conditions or treatments  Talk to your doctor, nurse or pharmacist before following any medical regimen to see if it is safe and effective for you

## 2019-01-02 NOTE — ED PROVIDER NOTES
History  Chief Complaint   Patient presents with    Dizziness     Patient reports over the last several weeks he has feeling of dizziness where he feels unsteady, also states that during that time he has been having intermittent chest pain     Patient is a 47-year-old male with a history of hyperlipidemia who presents with dizziness  Patient states he has felt intermittently dizzy for at least 2 weeks  He describes it as a lightheadedness rather than a room spinning sensation  He denies loss of consciousness  He states the symptoms occur with change in position but can happen at rest as well  He has also had intermittent chest pain for the last several days however he denies any chest pain today  Today he was at work when he had recurrence of lightheadedness and decided to come to the emergency department for further evaluation  He denies any focal weakness, numbness or tingling  He also states that he works at [employer] and is around a lot of chemicals  There was a change in a solvent that was being used around the same time as his symptoms started  History provided by:  Patient and spouse  Dizziness   Quality:  Lightheadedness  Severity:  Moderate  Duration:  2 weeks  Timing:  Intermittent  Progression:  Unchanged  Chronicity:  New  Ineffective treatments:  None tried  Associated symptoms: chest pain and headaches    Associated symptoms: no diarrhea, no nausea, no palpitations, no shortness of breath, no vomiting and no weakness        None       Past Medical History:   Diagnosis Date    Asthma     Hyperlipidemia        History reviewed  No pertinent surgical history  History reviewed  No pertinent family history  I have reviewed and agree with the history as documented      Social History   Substance Use Topics    Smoking status: Current Some Day Smoker     Types: Cigarettes    Smokeless tobacco: Never Used    Alcohol use Yes        Review of Systems   Constitutional: Negative for chills and fever  HENT: Negative for sore throat and trouble swallowing  Eyes: Negative for visual disturbance  Respiratory: Negative for cough and shortness of breath  Cardiovascular: Positive for chest pain  Negative for palpitations and leg swelling  Gastrointestinal: Negative for abdominal pain, diarrhea, nausea and vomiting  Genitourinary: Negative for dysuria  Musculoskeletal: Negative for neck pain and neck stiffness  Neurological: Positive for dizziness and headaches  Negative for weakness and numbness  Physical Exam  Physical Exam   Constitutional: He is oriented to person, place, and time  He appears well-developed and well-nourished  HENT:   Head: Atraumatic  Eyes: Pupils are equal, round, and reactive to light  EOM are normal    Neck: Normal range of motion  Neck supple  Cardiovascular: Normal rate, regular rhythm, normal heart sounds, intact distal pulses and normal pulses  Pulmonary/Chest: Effort normal and breath sounds normal  No respiratory distress  Abdominal: Soft  He exhibits no distension  There is no tenderness  There is no rigidity, no rebound and no guarding  Musculoskeletal: Normal range of motion  He exhibits no edema or tenderness  Neurological: He is alert and oriented to person, place, and time  He has normal strength  No cranial nerve deficit or sensory deficit  Cerebellar exam normal    Skin: Skin is warm and dry  Psychiatric: He has a normal mood and affect         Vital Signs  ED Triage Vitals [01/02/19 1053]   Temperature Pulse Respirations Blood Pressure SpO2   98 °F (36 7 °C) 64 16 137/70 98 %      Temp Source Heart Rate Source Patient Position - Orthostatic VS BP Location FiO2 (%)   Oral Monitor Sitting Right arm --      Pain Score       5           Vitals:    01/02/19 1142 01/02/19 1146 01/02/19 1251 01/02/19 1516   BP: 141/85 146/88 124/78 142/78   Pulse: 66 58 59 75   Patient Position - Orthostatic VS: Standing - Orthostatic VS Standing for 3 minutes - Orthostatic VS Sitting Sitting       Visual Acuity      ED Medications  Medications - No data to display    Diagnostic Studies  Results Reviewed     Procedure Component Value Units Date/Time    Troponin I [[id number]]  (Normal) Collected:  01/02/19 1438    Lab Status:  Final result Specimen:  Blood from Arm, Left Updated:  01/02/19 1505     Troponin I <0 02 ng/mL     UA w Reflex to Microscopic [[id number]] Collected:  01/02/19 1248    Lab Status:  Final result Specimen:  Urine from Urine, Clean Catch Updated:  01/02/19 1305     Color, UA Light Yellow     Clarity, UA Clear     Specific Gravity, UA <=1 005     pH, UA 7 0     Leukocytes, UA Negative     Nitrite, UA Negative     Protein, UA Negative mg/dl      Glucose, UA Negative mg/dl      Ketones, UA Negative mg/dl      Urobilinogen, UA 0 2 E U /dl      Bilirubin, UA Negative     Blood, UA Negative    [place] draw [[id number]] Collected:  01/02/19 1123    Lab Status:  Final result Specimen:  Blood Updated:  01/02/19 1303    Narrative: The following orders were created for panel order [place] draw  Procedure                               Abnormality         Status                     ---------                               -----------         ------                     Edger Caller Top on [facility][[id number]]                            Final result               Gold top on [facility][[id number]]                                  Final result                 Please view results for these tests on the individual orders      Troponin I [[id number]]  (Normal) Collected:  01/02/19 1123    Lab Status:  Final result Specimen:  Blood from Arm, Left Updated:  01/02/19 1152     Troponin I <0 02 ng/mL     Comprehensive metabolic panel [[id number]] Collected:  01/02/19 1123    Lab Status:  Final result Specimen:  Blood from Arm, Left Updated:  01/02/19 1147     Sodium 140 mmol/L      Potassium 3 8 mmol/L      Chloride 103 mmol/L      CO2 27 mmol/L      ANION GAP 10 mmol/L      BUN 18 mg/dL Creatinine 0 88 mg/dL      Glucose 100 mg/dL      Calcium 9 2 mg/dL      AST 26 U/L      ALT 68 U/L      Alkaline Phosphatase 59 U/L      Total Protein 7 6 g/dL      Albumin 4 1 g/dL      Total Bilirubin 0 70 mg/dL      eGFR 104 ml/min/1 73sq m     Narrative:         National Kidney Disease Education Program recommendations are as follows:  GFR calculation is accurate only with a steady state creatinine  Chronic Kidney disease less than 60 ml/min/1 73 sq  meters  Kidney failure less than 15 ml/min/1 73 sq  meters  CBC and differential [[id number]] Collected:  01/02/19 1123    Lab Status:  Final result Specimen:  Blood from Arm, Left Updated:  01/02/19 1129     WBC 7 04 Thousand/uL      RBC 4 62 Million/uL      Hemoglobin 15 3 g/dL      Hematocrit 43 6 %      MCV 94 fL      MCH 33 1 pg      MCHC 35 1 g/dL      RDW 12 7 %      MPV 10 2 fL      Platelets 213 Thousands/uL      nRBC 0 /100 WBCs      Neutrophils Relative 57 %      Immat GRANS % 0 %      Lymphocytes Relative 35 %      Monocytes Relative 5 %      Eosinophils Relative 2 %      Basophils Relative 1 %      Neutrophils Absolute 4 04 Thousands/µL      Immature Grans Absolute 0 02 Thousand/uL      Lymphocytes Absolute 2 44 Thousands/µL      Monocytes Absolute 0 36 Thousand/µL      Eosinophils Absolute 0 12 Thousand/µL      Basophils Absolute 0 06 Thousands/µL                  XR chest 2 views   Final Result by Lexy Fields MD (01/02 1247)      No acute cardiopulmonary disease  Workstation performed: [id number]         CT head without contrast   Final Result by Rosalinda Hoffmann DO (01/02 1149)      No acute intracranial abnormality                    Workstation performed: [id number]                    Procedures  ECG 12 Lead Documentation  Date/Time: 1/2/2019 12:34 PM  Performed by: Lizett Chaney by: Gila Montero     ECG reviewed by me, the ED Provider: yes    Patient location:  ED  Previous ECG:     Previous ECG:  Compared to current  Comments:      Sinus bradycardia at a rate of 58 beats per minute  Normal intervals  Normal axis  Normal QRS  No ST T wave abnormalities  Occasional PACs  It is similar to previous from 09/19/2016  ECG 12 Lead Documentation  Date/Time: 1/2/2019 2:44 PM  Performed by: Tono Pena  Authorized by: Tono Pena     ECG reviewed by me, the ED Provider: yes    Patient location:  ED  Previous ECG:     Previous ECG:  Compared to current  Comments:      Normal sinus rhythm at a rate of 64 beats per minute  Normal intervals  Normal axis  Normal QRS  No ST T wave abnormalities  Similar to previous from today at 11:11 a m  Phone Contacts  ED Phone Contact    ED Course     1213 chest x-ray negative  HEART Risk Score      Most Recent Value   History  0 Filed at: 01/02/2019 1232   ECG  0 Filed at: 01/02/2019 1232   Age  0 Filed at: 01/02/2019 1232   Risk Factors  1 Filed at: 01/02/2019 1232   Troponin  0 Filed at: 01/02/2019 1232   Heart Score Risk Calculator   History  0 Filed at: 01/02/2019 1232   ECG  0 Filed at: 01/02/2019 1232   Age  0 Filed at: 01/02/2019 1232   Risk Factors  1 Filed at: 01/02/2019 1232   Troponin  0 Filed at: 01/02/2019 1232   HEART Score  1 Filed at: 01/02/2019 1232   HEART Score  1 Filed at: 01/02/2019 1232      1240 patient feels better and ambulated to the restroom with a steady gait  MDM  Number of Diagnoses or Management Options  Lightheadedness: new and requires workup  Diagnosis management comments: Patient presents with intermittent lightheadedness for several weeks  He states 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   He states he does not wear a mask at work  His EKG shows no evidence of acute ischemia, bradycardia, AV blocks, prolonged QTC, WPW, Brugada syndrome, or other dysrhythmias  Troponin negative x2    Do not suspect ACS, PE, pericardial effusion, pneumothorax, esophageal rupture, or aortic dissection as cause of chest pain  Other labs are also unremarkable  Patient feels well in the emergency department and ambulated without difficulty  I feel he is stable for outpatient follow-up  Amount and/or Complexity of Data Reviewed  Clinical lab tests: ordered and reviewed  Tests in the radiology section of CPT®: ordered and reviewed  Tests in the medicine section of CPT®: ordered and reviewed  Review and summarize past medical records: yes  Independent visualization of images, tracings, or specimens: yes    Risk of Complications, Morbidity, and/or Mortality  Presenting problems: high  Diagnostic procedures: moderate  Management options: low    Patient Progress  Patient progress: stable    CritCare Time    Disposition  Final diagnoses:   Lightheadedness     Time reflects when diagnosis was documented in both MDM as applicable and the Disposition within this note     Time User Action Codes Description Comment    1/2/2019  3:13 PM Houston Mendoza Add [[id number]] [facility]       ED Disposition     ED Disposition Condition Comment    Discharge  Ignacia Banks discharge to home/self care  Condition at discharge: Good        Follow-up Information     Follow up With Specialties Details Why [address], [address] [place], Nurse Practitioner Schedule an appointment as soon as possible for a visit  [address] [facility]  [address]  [phone]            There are no discharge medications for this patient  No discharge procedures on file      ED Provider  Electronically Signed by           Bess Merino DO  01/02/19 2009

## 2019-01-03 LAB
ATRIAL RATE: 58 BPM
ATRIAL RATE: 64 BPM
P AXIS: 51 DEGREES
P AXIS: 51 DEGREES
PR INTERVAL: 138 MS
PR INTERVAL: 144 MS
QRS AXIS: 48 DEGREES
QRS AXIS: 55 DEGREES
QRSD INTERVAL: 100 MS
QRSD INTERVAL: 100 MS
QT INTERVAL: 398 MS
QT INTERVAL: 416 MS
QTC INTERVAL: 408 MS
QTC INTERVAL: 410 MS
T WAVE AXIS: 49 DEGREES
T WAVE AXIS: 57 DEGREES
VENTRICULAR RATE: 58 BPM
VENTRICULAR RATE: 64 BPM

## 2019-01-03 PROCEDURE — 93010 ELECTROCARDIOGRAM REPORT: CPT | Performed by: INTERNAL MEDICINE

## 2019-01-14 ENCOUNTER — OFFICE VISIT (OUTPATIENT)
Dept: FAMILY MEDICINE CLINIC | Facility: CLINIC | Age: 44
End: 2019-01-14
Payer: COMMERCIAL

## 2019-01-14 VITALS
HEART RATE: 73 BPM | RESPIRATION RATE: 18 BRPM | DIASTOLIC BLOOD PRESSURE: 78 MMHG | SYSTOLIC BLOOD PRESSURE: 136 MMHG | HEIGHT: 71 IN | WEIGHT: 203.8 LBS | OXYGEN SATURATION: 98 % | BODY MASS INDEX: 28.53 KG/M2 | TEMPERATURE: 98.9 F

## 2019-01-14 DIAGNOSIS — R42 LIGHTHEADEDNESS: Primary | ICD-10-CM

## 2019-01-14 PROBLEM — R07.9 CHEST PAIN: Status: RESOLVED | Noted: 2017-04-18 | Resolved: 2019-01-14

## 2019-01-14 PROBLEM — R35.0 INCREASED FREQUENCY OF URINATION: Status: RESOLVED | Noted: 2017-04-18 | Resolved: 2019-01-14

## 2019-01-14 PROBLEM — E78.5 HYPERLIPIDEMIA: Status: ACTIVE | Noted: 2017-05-01

## 2019-01-14 PROBLEM — R35.0 INCREASED FREQUENCY OF URINATION: Status: ACTIVE | Noted: 2017-04-18

## 2019-01-14 PROBLEM — N52.9 ERECTILE DYSFUNCTION: Status: ACTIVE | Noted: 2017-04-18

## 2019-01-14 PROBLEM — R07.9 CHEST PAIN: Status: ACTIVE | Noted: 2017-04-18

## 2019-01-14 PROCEDURE — 99213 OFFICE O/P EST LOW 20 MIN: CPT | Performed by: FAMILY MEDICINE

## 2019-01-14 RX ORDER — CLOBETASOL PROPIONATE 0.5 MG/G
CREAM TOPICAL 2 TIMES DAILY
COMMUNITY
Start: 2011-06-08 | End: 2020-02-17

## 2019-01-14 RX ORDER — CHLORAL HYDRATE 500 MG
2 CAPSULE ORAL DAILY
COMMUNITY
Start: 2017-05-01 | End: 2020-09-21

## 2019-01-15 NOTE — PROGRESS NOTES
Assessment/Plan:      Diagnoses and all orders for this visit:    Lightheadedness  -     TSH, 3rd generation with Free T4 reflex; Future    Patient is here for an ER follow-up for lightheadedness  Patient reports episodes of lightheadedness at work  Denies anymore episodes of chest pain since the ER  EKG in ER showed NSR  CT scan of head was negative  Lab work wnl  Neuro exam wnl  TSH ordered  Discussed with the patient that his symptoms may be caused by the fumes from the solvents at work since his symptoms usually occur at work  Patient instructed to wear a mask at work  Patient instructed to follow-up in 2 weeks or sooner if symptoms get worse or do not get better  Reviewed signs and symptoms that warrant going back to the ER  Subjective:     Patient ID: Ignacia Banks is a 37 y o  male  Patient is here for an ER follow-up for lightheadedness  Patient reports occasional episodes of lightheadedness prior to the holidays  Patient reports that the episodes would occur when he was just standing there at work  Patient reports that he felt symptoms every couple of days  Patient reports that the episodes would last a couple of minutes  Patient reports that he also was getting episodes of chest pain  Patient reports that the episodes would last a couple of seconds and then go away on their own  Patient reports that he would have the episodes of chest pain every couple of days  Denies any Has, SOB, or palpitations  Patient reports that he was working more hours close to the holidays and thought his symptoms were due to work  Patient reports that he had an episode of severe lightheadedness on January 2, 2019 and went to the ER  Patient reports that the ER physician did a CT scan of the head, EKG, Lab work and chest X-ray  Patient reports that everything was fine  Patient reports that he has been feeling fine since then until today   Patient reports that he felt lightheaded again today towards the end of his shift  Patient reports that he drinks water, soda, and coffee at work  Patient reports that he had a normal lunch  Patient reports that he still feels alittle lightheaded but its improved  Denies any episodes of chest pain since his ER visit  Denies any SOB, palpitations, Has, vision changes, seizures, fainting, or weakness  Patient reports that the ER told him to follow-up with his PCP for a further work up  Patient reports that he is a   Patient reports that they changed solvents at work over the past couple of months  Patient reports that he does not wear a mask at work  Patient reports that some of the workers wear masks and report that they feel better since wearing a mask at work  Review of Systems   Constitutional: Positive for fatigue  Negative for appetite change, chills and fever  HENT: Negative for congestion, ear pain, sore throat and trouble swallowing  Eyes: Negative for pain, discharge and redness  Respiratory: Negative for cough, chest tightness, shortness of breath and wheezing  Cardiovascular:        As noted in HPI  Gastrointestinal: Negative for abdominal pain, blood in stool, diarrhea, nausea and vomiting  Genitourinary: Negative for dysuria, frequency, hematuria and urgency  Skin: Negative for rash  Neurological: Positive for light-headedness  Negative for seizures, syncope, numbness and headaches  Psychiatric/Behavioral: Negative for suicidal ideas  Denies any depression  Objective:     Physical Exam   Constitutional: He is oriented to person, place, and time  No distress  HENT:   Right Ear: External ear normal    Left Ear: External ear normal    Mouth/Throat: Oropharynx is clear and moist    Tympanic membranes and ear canals wnl  Eyes: Pupils are equal, round, and reactive to light  Conjunctivae are normal    Neck: Normal range of motion  Cardiovascular: Normal rate, regular rhythm and normal heart sounds  No edema noted  Pulmonary/Chest: Effort normal and breath sounds normal  No respiratory distress  He has no wheezes  Musculoskeletal:   Gait wnl  Neurological: He is alert and oriented to person, place, and time  No cranial nerve deficit  Coordination normal    Skin: No rash noted  Psychiatric: He has a normal mood and affect  Vitals reviewed

## 2019-01-20 LAB — TSH SERPL-ACNC: 2.65 MIU/L (ref 0.4–4.5)

## 2019-02-26 ENCOUNTER — OFFICE VISIT (OUTPATIENT)
Dept: FAMILY MEDICINE CLINIC | Facility: CLINIC | Age: 44
End: 2019-02-26
Payer: COMMERCIAL

## 2019-02-26 VITALS
HEART RATE: 72 BPM | HEIGHT: 71 IN | BODY MASS INDEX: 28.36 KG/M2 | SYSTOLIC BLOOD PRESSURE: 130 MMHG | OXYGEN SATURATION: 97 % | DIASTOLIC BLOOD PRESSURE: 82 MMHG | TEMPERATURE: 98.5 F | WEIGHT: 202.6 LBS | RESPIRATION RATE: 18 BRPM

## 2019-02-26 DIAGNOSIS — K52.9 GASTROENTERITIS: Primary | ICD-10-CM

## 2019-02-26 PROCEDURE — 99213 OFFICE O/P EST LOW 20 MIN: CPT | Performed by: NURSE PRACTITIONER

## 2019-02-26 NOTE — PROGRESS NOTES
Assessment/Plan:      Diagnoses and all orders for this visit:    Gastroenteritis      Discussed with the patient that his symptoms are most likely caused by viral gastroenteritis  Patient instructed to make sure that he drinks plenty of fluids especially water and pedialyte  Patient instructed to eat a bland diet  Patient instructed to follow-up if symptoms get worse or do not get better  Will order stool cultures if diarrhea continues  Subjective:     Patient ID: Brandi Chilel is a 37 y o  male  Patient reports watery diarrhea since yesterday  Patient reports about 5-6 episodes yesterday and 3 episodes this morning  Denies any blood in the stool  Patient reports that he felt feverish yesterday  Patient reports that he vomited twice yesterday  Patient also reports bodyaches  Patient reports that his last episode of diarrhea was this morning  Denies any abdominal pain, sore throat, cough, nasal congestion, or earache  Patient reports that he took advil this morning for a slight HA  Patient denies eating at a restaurant recently  Patient reports that he feels alittle better today  Review of Systems   Constitutional:        As noted in HPI  HENT: Negative for congestion, ear pain and sore throat  Eyes: Negative for pain, discharge and redness  Respiratory: Negative for cough, shortness of breath and wheezing  Cardiovascular: Negative for chest pain and palpitations  Gastrointestinal: Positive for diarrhea and vomiting  Negative for abdominal pain and blood in stool  Genitourinary: Negative for dysuria, frequency and hematuria  Musculoskeletal: Positive for myalgias  Skin: Negative for rash  Neurological: Positive for headaches  Negative for dizziness, seizures, syncope and light-headedness  Objective:     Physical Exam   Constitutional: He is oriented to person, place, and time  No distress  Patient appears tired      HENT:   Right Ear: External ear normal    Left Ear: External ear normal    Mouth/Throat: Oropharynx is clear and moist    Eyes: Pupils are equal, round, and reactive to light  Conjunctivae are normal    Cardiovascular: Normal rate, regular rhythm and normal heart sounds  Pulmonary/Chest: Effort normal and breath sounds normal  No respiratory distress  He has no wheezes  Abdominal: Soft  He exhibits no distension and no mass  There is no tenderness  Musculoskeletal:   Gait wnl  Neurological: He is alert and oriented to person, place, and time  Skin: No rash noted  Psychiatric: He has a normal mood and affect  Vitals reviewed

## 2019-04-04 ENCOUNTER — APPOINTMENT (OUTPATIENT)
Dept: RADIOLOGY | Facility: CLINIC | Age: 44
End: 2019-04-04
Payer: COMMERCIAL

## 2019-04-04 ENCOUNTER — OFFICE VISIT (OUTPATIENT)
Dept: OBGYN CLINIC | Facility: CLINIC | Age: 44
End: 2019-04-04
Payer: OTHER MISCELLANEOUS

## 2019-04-04 VITALS
BODY MASS INDEX: 29.62 KG/M2 | DIASTOLIC BLOOD PRESSURE: 86 MMHG | HEART RATE: 73 BPM | HEIGHT: 69 IN | SYSTOLIC BLOOD PRESSURE: 135 MMHG | WEIGHT: 200 LBS

## 2019-04-04 DIAGNOSIS — S62.632B OPEN DISPLACED FRACTURE OF DISTAL PHALANX OF RIGHT MIDDLE FINGER, INITIAL ENCOUNTER: Primary | ICD-10-CM

## 2019-04-04 DIAGNOSIS — M79.644 PAIN OF FINGER OF RIGHT HAND: ICD-10-CM

## 2019-04-04 PROCEDURE — 73140 X-RAY EXAM OF FINGER(S): CPT

## 2019-04-04 PROCEDURE — 99203 OFFICE O/P NEW LOW 30 MIN: CPT | Performed by: SURGERY

## 2019-04-04 RX ORDER — CEPHALEXIN 500 MG/1
CAPSULE ORAL
Refills: 0 | COMMUNITY
Start: 2019-04-01 | End: 2020-02-17

## 2019-04-04 RX ORDER — DOXYCYCLINE HYCLATE 100 MG/1
100 TABLET, DELAYED RELEASE ORAL 2 TIMES DAILY
COMMUNITY
Start: 2019-04-01 | End: 2019-04-11

## 2019-04-04 RX ORDER — DOXYCYCLINE HYCLATE 100 MG/1
TABLET, DELAYED RELEASE ORAL
Refills: 0 | COMMUNITY
Start: 2019-04-01 | End: 2020-02-17

## 2019-04-04 RX ORDER — CEPHALEXIN 500 MG/1
500 CAPSULE ORAL 3 TIMES DAILY
COMMUNITY
Start: 2019-04-01 | End: 2019-04-11

## 2019-04-11 ENCOUNTER — OFFICE VISIT (OUTPATIENT)
Dept: OCCUPATIONAL THERAPY | Facility: CLINIC | Age: 44
End: 2019-04-11
Payer: COMMERCIAL

## 2019-04-11 ENCOUNTER — OFFICE VISIT (OUTPATIENT)
Dept: OBGYN CLINIC | Facility: CLINIC | Age: 44
End: 2019-04-11
Payer: OTHER MISCELLANEOUS

## 2019-04-11 VITALS
WEIGHT: 195 LBS | SYSTOLIC BLOOD PRESSURE: 132 MMHG | HEIGHT: 71 IN | BODY MASS INDEX: 27.3 KG/M2 | DIASTOLIC BLOOD PRESSURE: 91 MMHG | HEART RATE: 73 BPM

## 2019-04-11 DIAGNOSIS — S62.632D OPEN DISPLACED FRACTURE OF DISTAL PHALANX OF RIGHT MIDDLE FINGER WITH ROUTINE HEALING, SUBSEQUENT ENCOUNTER: Primary | ICD-10-CM

## 2019-04-11 DIAGNOSIS — S62.632B OPEN DISPLACED FRACTURE OF DISTAL PHALANX OF RIGHT MIDDLE FINGER, INITIAL ENCOUNTER: Primary | ICD-10-CM

## 2019-04-11 PROCEDURE — 99213 OFFICE O/P EST LOW 20 MIN: CPT | Performed by: SURGERY

## 2019-04-11 PROCEDURE — L3933 FO W/O JOINTS CF: HCPCS | Performed by: OCCUPATIONAL THERAPIST

## 2019-05-01 ENCOUNTER — OFFICE VISIT (OUTPATIENT)
Dept: OBGYN CLINIC | Facility: HOSPITAL | Age: 44
End: 2019-05-01
Payer: OTHER MISCELLANEOUS

## 2019-05-01 ENCOUNTER — HOSPITAL ENCOUNTER (OUTPATIENT)
Dept: RADIOLOGY | Facility: HOSPITAL | Age: 44
Discharge: HOME | End: 2019-05-01
Attending: SURGERY
Payer: COMMERCIAL

## 2019-05-01 VITALS
BODY MASS INDEX: 25.9 KG/M2 | HEART RATE: 77 BPM | WEIGHT: 185 LBS | SYSTOLIC BLOOD PRESSURE: 146 MMHG | HEIGHT: 71 IN | DIASTOLIC BLOOD PRESSURE: 90 MMHG

## 2019-05-01 DIAGNOSIS — S62.632B OPEN DISPLACED FRACTURE OF DISTAL PHALANX OF RIGHT MIDDLE FINGER, INITIAL ENCOUNTER: Primary | ICD-10-CM

## 2019-05-01 DIAGNOSIS — S62.632B OPEN DISPLACED FRACTURE OF DISTAL PHALANX OF RIGHT MIDDLE FINGER, INITIAL ENCOUNTER: ICD-10-CM

## 2019-05-01 PROCEDURE — 99213 OFFICE O/P EST LOW 20 MIN: CPT | Performed by: SURGERY

## 2019-05-01 PROCEDURE — 73140 X-RAY EXAM OF FINGER(S): CPT

## 2019-05-31 ENCOUNTER — OFFICE VISIT (OUTPATIENT)
Dept: OBGYN CLINIC | Facility: HOSPITAL | Age: 44
End: 2019-05-31
Payer: OTHER MISCELLANEOUS

## 2019-05-31 ENCOUNTER — HOSPITAL ENCOUNTER (OUTPATIENT)
Dept: RADIOLOGY | Facility: HOSPITAL | Age: 44
Discharge: HOME | End: 2019-05-31
Attending: SURGERY
Payer: OTHER MISCELLANEOUS

## 2019-05-31 VITALS
HEART RATE: 69 BPM | WEIGHT: 204 LBS | BODY MASS INDEX: 28.45 KG/M2 | SYSTOLIC BLOOD PRESSURE: 132 MMHG | DIASTOLIC BLOOD PRESSURE: 78 MMHG

## 2019-05-31 DIAGNOSIS — S62.632B OPEN DISPLACED FRACTURE OF DISTAL PHALANX OF RIGHT MIDDLE FINGER, INITIAL ENCOUNTER: ICD-10-CM

## 2019-05-31 DIAGNOSIS — S62.632B OPEN DISPLACED FRACTURE OF DISTAL PHALANX OF RIGHT MIDDLE FINGER, INITIAL ENCOUNTER: Primary | ICD-10-CM

## 2019-05-31 PROCEDURE — 99213 OFFICE O/P EST LOW 20 MIN: CPT | Performed by: SURGERY

## 2019-05-31 PROCEDURE — 73140 X-RAY EXAM OF FINGER(S): CPT

## 2020-01-13 ENCOUNTER — TELEPHONE (OUTPATIENT)
Dept: FAMILY MEDICINE CLINIC | Facility: CLINIC | Age: 45
End: 2020-01-13

## 2020-01-13 NOTE — TELEPHONE ENCOUNTER
LM FOR PT TO CB TO SCHEDULE YEARLY PHYSICAL      LAST OV: 02/26/19  LAST PHYSICAL: NOT DOCUMENTED IN EPIC

## 2020-02-17 ENCOUNTER — OFFICE VISIT (OUTPATIENT)
Dept: FAMILY MEDICINE CLINIC | Facility: CLINIC | Age: 45
End: 2020-02-17
Payer: COMMERCIAL

## 2020-02-17 VITALS
WEIGHT: 207 LBS | OXYGEN SATURATION: 96 % | DIASTOLIC BLOOD PRESSURE: 86 MMHG | RESPIRATION RATE: 16 BRPM | HEIGHT: 71 IN | SYSTOLIC BLOOD PRESSURE: 122 MMHG | HEART RATE: 75 BPM | BODY MASS INDEX: 28.98 KG/M2

## 2020-02-17 DIAGNOSIS — E78.5 HYPERLIPIDEMIA, UNSPECIFIED HYPERLIPIDEMIA TYPE: ICD-10-CM

## 2020-02-17 DIAGNOSIS — R35.0 URINARY FREQUENCY: ICD-10-CM

## 2020-02-17 DIAGNOSIS — L40.9 PSORIASIS: ICD-10-CM

## 2020-02-17 DIAGNOSIS — Z00.00 ANNUAL PHYSICAL EXAM: Primary | ICD-10-CM

## 2020-02-17 DIAGNOSIS — N52.9 ERECTILE DYSFUNCTION, UNSPECIFIED ERECTILE DYSFUNCTION TYPE: ICD-10-CM

## 2020-02-17 PROCEDURE — 99396 PREV VISIT EST AGE 40-64: CPT | Performed by: NURSE PRACTITIONER

## 2020-02-17 RX ORDER — CLOBETASOL PROPIONATE 0.5 MG/G
CREAM TOPICAL 2 TIMES DAILY
Qty: 15 G | Refills: 0 | Status: SHIPPED | OUTPATIENT
Start: 2020-02-17 | End: 2021-09-02 | Stop reason: ALTCHOICE

## 2020-02-17 RX ORDER — ASPIRIN 81 MG/1
81 TABLET ORAL DAILY
COMMUNITY
End: 2020-02-17 | Stop reason: ALTCHOICE

## 2020-02-17 RX ORDER — MULTIVIT-MIN/IRON FUM/FOLIC AC 7.5 MG-4
1 TABLET ORAL DAILY
COMMUNITY

## 2020-02-17 NOTE — PROGRESS NOTES
[facility]    NAME: Annette Campuzano  AGE: 53 y o  SEX: male  : 1975     DATE: 2020     Assessment and Plan:     Problem List Items Addressed This Visit        Musculoskeletal and Integument    Psoriasis    Relevant Medications    clobetasol (TEMOVATE) 0 05 % cream       Other    Erectile dysfunction    Relevant Orders    Ambulatory referral to Urology    Hyperlipidemia    Relevant Orders    Comprehensive metabolic panel    CBC and differential    TSH, 3rd generation with Free T4 reflex    Lipid Panel with Direct LDL reflex    Urinary frequency    Relevant Orders    Ambulatory referral to Urology    Comprehensive metabolic panel    UA (URINE) with reflex to Scope    Annual physical exam - Primary    Relevant Orders    Comprehensive metabolic panel    CBC and differential    TSH, 3rd generation with Free T4 reflex          Immunizations and preventive care screenings were discussed with patient today  Appropriate education was printed on patient's after visit summary  Counseling:  Alcohol/drug use: discussed moderation in alcohol intake, the recommendations for healthy alcohol use, and avoidance of illicit drug use  Dental Health: discussed importance of regular tooth brushing, flossing, and dental visits  Injury prevention: discussed safety/seat belts, safety helmets, smoke detectors, carbon dioxide detectors  Sexual health: discussed sexually transmitted diseases, use of condoms,   · Exercise: the importance of regular exercise/physical activity was discussed  Recommend exercise 3-5 times per week for at least 30 minutes  BMI Counseling: Body mass index is 28 87 kg/m²  The BMI is above normal  Nutrition recommendations include encouraging healthy choices of fruits and vegetables, consuming healthier snacks and reducing intake of saturated and trans fat   Exercise recommendations include exercising 3-5 times per week        Tobacco Cessation Counseling: Tobacco cessation counseling was provided  The patient is sincerely urged to quit consumption of tobacco  He is not ready to quit tobacco      Patient reports that he needs a refill on his clobetasol cream for psoriasis  Patient reports that the cream helps  Patient reports that he only uses the cream prn for a short time period  Refilled clobetasol cream      Return in 3 months (on 5/17/2020)  Chief Complaint:     Chief Complaint   Patient presents with    Annual Exam      History of Present Illness:     Adult Annual Physical   Patient here for a comprehensive physical exam  The patient reports problems - increased urinary frequency       Patient reports increased urinary frequency for the past couple of months  Denies any fever, hematuria, dysuria, urgency, or hesitancy  Patient reports occasional erectile dysfunction for the past couple of years  Patient reports that he never followed up with a urologist      Diet and Physical Activity  · Diet/Nutrition: well balanced diet  · Exercise: walking and 1-2 times a week on average  Depression Screening  PHQ-9 Depression Screening    PHQ-9:    Frequency of the following problems over the past two weeks:       Little interest or pleasure in doing things:  0 - not at all  Feeling down, depressed, or hopeless:  0 - not at all  PHQ-2 Score:  0       General Health  · Sleep: gets 7-8 hours of sleep on average  · Hearing: normal - bilateral   · Vision: goes for regular eye exams and wears glasses  · Dental: no dental visits for >1 year and brushes teeth twice daily   Health  · Symptoms include: erectile dysfunction and urinary frequency     Review of Systems:     Review of Systems   Constitutional: Negative for appetite change, chills, fatigue and fever  HENT: Negative for congestion, ear pain, sore throat and trouble swallowing  Eyes: Negative for pain, discharge and redness     Respiratory: Negative for cough, chest tightness, shortness of breath and wheezing  Cardiovascular: Negative for chest pain, palpitations and leg swelling  Gastrointestinal: Negative for abdominal pain, blood in stool, diarrhea, nausea and vomiting  Genitourinary: Positive for frequency  Negative for dysuria, hematuria and urgency  Musculoskeletal: Negative for myalgias and neck pain  Skin: Positive for rash  Neurological: Negative for dizziness, seizures, syncope, weakness, light-headedness, numbness and headaches  Psychiatric/Behavioral: Negative for suicidal ideas  Denies any depression  Past Medical History:     Past Medical History:   Diagnosis Date    Asthma     Hyperlipidemia       Past Surgical History:     History reviewed  No pertinent surgical history     Family History:     Family History   Problem Relation Age of Onset    No Known Problems Father     Mental illness Mother       Social History:        Social History     Socioeconomic History    Marital status: /Civil Union     Spouse name: None    Number of children: None    Years of education: None    Highest education level: None   Occupational History    None   Social Needs    Financial resource strain: None    Food insecurity:     Worry: None     Inability: None    Transportation needs:     Medical: None     Non-medical: None   Tobacco Use    Smoking status: Current Some Day Smoker     Types: Cigarettes    Smokeless tobacco: Never Used   Substance and Sexual Activity    Alcohol use: Yes     Comment: occasional alcohol use    Drug use: No    Sexual activity: None   Lifestyle    Physical activity:     Days per week: None     Minutes per session: None    Stress: None   Relationships    Social connections:     Talks on phone: None     Gets together: None     Attends Druze service: None     Active member of club or organization: None     Attends meetings of clubs or organizations: None     Relationship status: None    Intimate partner violence:     Fear of current or ex partner: None     Emotionally abused: None     Physically abused: None     Forced sexual activity: None   Other Topics Concern    None   Social History Narrative    None      Current Medications:     Current Outpatient Medications   Medication Sig Dispense Refill    Multiple Vitamins-Minerals (MULTIVITAMIN WITH MINERALS) tablet Take 1 tablet by mouth daily      Omega-3 Fatty Acids (OMEGA-3 FISH OIL) 1000 MG CAPS Take 2 capsules by mouth daily      clobetasol (TEMOVATE) 0 05 % cream Apply topically 2 (two) times a day 15 g 0     No current facility-administered medications for this visit  Allergies: Allergies   Allergen Reactions    Penicillins       Physical Exam:     /86 (BP Location: Right arm, Patient Position: Sitting, Cuff Size: Large)   Pulse 75   Resp 16   Ht 5' 11" (1 803 m)   Wt 93 9 kg (207 lb)   SpO2 96%   BMI 28 87 kg/m²     Physical Exam   Constitutional: He is oriented to person, place, and time  No distress  HENT:   Right Ear: External ear normal    Left Ear: External ear normal    Nose: Nose normal    Mouth/Throat: Oropharynx is clear and moist    Tympanic membranes and ear canals   Posterior pharynx wnl  Eyes: Pupils are equal, round, and reactive to light  Conjunctivae are normal  Right eye exhibits no discharge  Left eye exhibits no discharge  Neck: Normal range of motion  No thyromegaly present  Cardiovascular: Normal rate, regular rhythm, normal heart sounds and intact distal pulses  No edema noted  Pulmonary/Chest: Effort normal and breath sounds normal  He has no wheezes  Abdominal: Soft  He exhibits no mass  There is no tenderness  Genitourinary:   Genitourinary Comments: Patient refuses a genital exam   Musculoskeletal: Normal range of motion  Gait wnl  Lymphadenopathy:     He has no cervical adenopathy  Neurological: He is alert and oriented to person, place, and time   No cranial nerve deficit  Coordination normal    Skin:   Dry patch noted on patient's neck  Psychiatric: He has a normal mood and affect  Vitals reviewed        [address] [place] [address],[address] [facility]

## 2020-02-18 NOTE — PATIENT INSTRUCTIONS

## 2020-02-22 LAB
ALBUMIN SERPL-MCNC: 4.7 G/DL (ref 3.6–5.1)
ALBUMIN/GLOB SERPL: 2 (CALC) (ref 1–2.5)
ALP SERPL-CCNC: 50 U/L (ref 36–130)
ALT SERPL-CCNC: 30 U/L (ref 9–46)
AST SERPL-CCNC: 18 U/L (ref 10–40)
BASOPHILS # BLD AUTO: 54 CELLS/UL (ref 0–200)
BASOPHILS NFR BLD AUTO: 0.8 %
BILIRUB SERPL-MCNC: 0.5 MG/DL (ref 0.2–1.2)
BUN SERPL-MCNC: 23 MG/DL (ref 7–25)
BUN/CREAT SERPL: ABNORMAL (CALC) (ref 6–22)
CALCIUM SERPL-MCNC: 9.5 MG/DL (ref 8.6–10.3)
CHLORIDE SERPL-SCNC: 105 MMOL/L (ref 98–110)
CHOLEST SERPL-MCNC: 276 MG/DL
CHOLEST/HDLC SERPL: 7.5 (CALC)
CO2 SERPL-SCNC: 31 MMOL/L (ref 20–32)
CREAT SERPL-MCNC: 1.18 MG/DL (ref 0.6–1.35)
EOSINOPHIL # BLD AUTO: 214 CELLS/UL (ref 15–500)
EOSINOPHIL NFR BLD AUTO: 3.2 %
ERYTHROCYTE [DISTWIDTH] IN BLOOD BY AUTOMATED COUNT: 12.6 % (ref 11–15)
GLOBULIN SER CALC-MCNC: 2.3 G/DL (CALC) (ref 1.9–3.7)
GLUCOSE SERPL-MCNC: 104 MG/DL (ref 65–99)
HCT VFR BLD AUTO: 45.6 % (ref 38.5–50)
HDLC SERPL-MCNC: 37 MG/DL
HGB BLD-MCNC: 15.8 G/DL (ref 13.2–17.1)
LDLC SERPL CALC-MCNC: 202 MG/DL (CALC)
LYMPHOCYTES # BLD AUTO: 2586 CELLS/UL (ref 850–3900)
LYMPHOCYTES NFR BLD AUTO: 38.6 %
MCH RBC QN AUTO: 33.2 PG (ref 27–33)
MCHC RBC AUTO-ENTMCNC: 34.6 G/DL (ref 32–36)
MCV RBC AUTO: 95.8 FL (ref 80–100)
MONOCYTES # BLD AUTO: 496 CELLS/UL (ref 200–950)
MONOCYTES NFR BLD AUTO: 7.4 %
NEUTROPHILS # BLD AUTO: 3350 CELLS/UL (ref 1500–7800)
NEUTROPHILS NFR BLD AUTO: 50 %
NONHDLC SERPL-MCNC: 239 MG/DL (CALC)
PLATELET # BLD AUTO: 237 THOUSAND/UL (ref 140–400)
PMV BLD REES-ECKER: 11.1 FL (ref 7.5–12.5)
POTASSIUM SERPL-SCNC: 4.5 MMOL/L (ref 3.5–5.3)
PROT SERPL-MCNC: 7 G/DL (ref 6.1–8.1)
RBC # BLD AUTO: 4.76 MILLION/UL (ref 4.2–5.8)
SL AMB EGFR AFRICAN AMERICAN: 85 ML/MIN/1.73M2
SL AMB EGFR NON AFRICAN AMERICAN: 74 ML/MIN/1.73M2
SODIUM SERPL-SCNC: 141 MMOL/L (ref 135–146)
TRIGL SERPL-MCNC: 194 MG/DL
TSH SERPL-ACNC: 2.52 MIU/L (ref 0.4–4.5)
WBC # BLD AUTO: 6.7 THOUSAND/UL (ref 3.8–10.8)

## 2020-02-24 ENCOUNTER — TELEPHONE (OUTPATIENT)
Dept: FAMILY MEDICINE CLINIC | Facility: CLINIC | Age: 45
End: 2020-02-24

## 2020-02-24 NOTE — TELEPHONE ENCOUNTER
----- Message from Dante Patrick sent at 2/24/2020  1:10 PM EST -----  Please call the patient and schedule him for a follow-up to review his lab results

## 2020-03-09 ENCOUNTER — OFFICE VISIT (OUTPATIENT)
Dept: FAMILY MEDICINE CLINIC | Facility: CLINIC | Age: 45
End: 2020-03-09
Payer: COMMERCIAL

## 2020-03-09 VITALS
WEIGHT: 207 LBS | SYSTOLIC BLOOD PRESSURE: 130 MMHG | HEIGHT: 71 IN | BODY MASS INDEX: 28.98 KG/M2 | OXYGEN SATURATION: 99 % | RESPIRATION RATE: 16 BRPM | DIASTOLIC BLOOD PRESSURE: 74 MMHG | HEART RATE: 93 BPM

## 2020-03-09 DIAGNOSIS — J45.20 MILD INTERMITTENT ASTHMA WITHOUT COMPLICATION: ICD-10-CM

## 2020-03-09 DIAGNOSIS — E78.5 HYPERLIPIDEMIA, UNSPECIFIED HYPERLIPIDEMIA TYPE: Primary | ICD-10-CM

## 2020-03-09 DIAGNOSIS — N52.9 ERECTILE DYSFUNCTION, UNSPECIFIED ERECTILE DYSFUNCTION TYPE: ICD-10-CM

## 2020-03-09 DIAGNOSIS — E66.3 OVERWEIGHT (BMI 25.0-29.9): ICD-10-CM

## 2020-03-09 DIAGNOSIS — R73.01 ELEVATED FASTING BLOOD SUGAR: ICD-10-CM

## 2020-03-09 PROBLEM — K52.9 GASTROENTERITIS: Status: RESOLVED | Noted: 2019-02-26 | Resolved: 2020-03-09

## 2020-03-09 PROCEDURE — 3008F BODY MASS INDEX DOCD: CPT | Performed by: NURSE PRACTITIONER

## 2020-03-09 PROCEDURE — 99214 OFFICE O/P EST MOD 30 MIN: CPT | Performed by: NURSE PRACTITIONER

## 2020-03-09 PROCEDURE — 4004F PT TOBACCO SCREEN RCVD TLK: CPT | Performed by: NURSE PRACTITIONER

## 2020-03-09 RX ORDER — ROSUVASTATIN CALCIUM 10 MG/1
10 TABLET, COATED ORAL DAILY
Qty: 30 TABLET | Refills: 2 | Status: SHIPPED | OUTPATIENT
Start: 2020-03-09 | End: 2020-06-15

## 2020-03-09 RX ORDER — ALBUTEROL SULFATE 90 UG/1
2 AEROSOL, METERED RESPIRATORY (INHALATION) EVERY 6 HOURS PRN
Qty: 1 INHALER | Refills: 0 | Status: SHIPPED | OUTPATIENT
Start: 2020-03-09 | End: 2020-04-14

## 2020-03-09 NOTE — PROGRESS NOTES
Assessment/Plan:      Diagnoses and all orders for this visit:    Hyperlipidemia, unspecified hyperlipidemia type  -     HEMOGLOBIN A1C W/ EAG ESTIMATION; Future  -     Lipid Panel with Direct LDL reflex; Future  -     Comprehensive metabolic panel; Future  -     rosuvastatin (CRESTOR) 10 MG tablet; Take 1 tablet (10 mg total) by mouth daily    Total cholesterol 276    Low fat diet reviewed  Crestor prescribed  Medication information and side effects reviewed  Repeat Lipid panel and CMP ordered  Patient instructed to exercise on a regular basis  Elevated fasting blood sugar  -     HEMOGLOBIN A1C W/ EAG ESTIMATION; Future    Overweight (BMI 25 0-29 9)  -     HEMOGLOBIN A1C W/ EAG ESTIMATION; Future  -     Lipid Panel with Direct LDL reflex; Future  -     Comprehensive metabolic panel; Future    Patient instructed to eat a healthy low fat diet and to exercise on a regular basis  Mild intermittent asthma without complication  -     albuterol (Ventolin HFA) 90 mcg/act inhaler; Inhale 2 puffs every 6 (six) hours as needed for wheezing    Continue albuterol prn  Erectile dysfunction, unspecified erectile dysfunction type  Patient has a follow-up appointment scheduled with urology  Reviewed lab results with the patient  Patient instructed to follow-up in 3 months or sooner prn  Subjective:     Patient ID: Shreya Jones is a 53 y o  male  Patient is here for a follow-up to review his lab results  Patient has a follow-up appointment scheduled with urology for increased urinary frequency for the past couple of months  Denies any fever, dysuria, hematuria, or urgency  Patient is also going to follow-up with urology for occasional erectile dysfunction  Patient reports that he has had it for years  Patient is here for a follow-up for hyperlipidemia  Patient reports that he does try to watch his diet  Patient reports that he is eating more salads   Patient reports that he walks 2-3 times a week  Denies any chest pain, SOB, or palpitations  Patient reports that he needs a refill on his rescue inhaler for asthma  Denies any cough, wheezing, or SOB currently  Patient reports that he uses his rescue inhaler prn every couple of weeks  Review of Systems   Constitutional: Negative for appetite change, chills and fever  Respiratory: Negative for chest tightness, shortness of breath and wheezing  Cardiovascular: Negative for chest pain and palpitations  Gastrointestinal: Negative for abdominal pain, diarrhea, nausea and vomiting  Genitourinary:        As noted in HPI  Skin: Negative for rash  Neurological: Negative for dizziness, syncope, light-headedness and headaches  Objective:     Physical Exam   Constitutional: He is oriented to person, place, and time  No distress  HENT:   Right Ear: External ear normal    Left Ear: External ear normal    Mouth/Throat: Oropharynx is clear and moist    Eyes: Pupils are equal, round, and reactive to light  Conjunctivae are normal    Cardiovascular: Normal rate, regular rhythm and normal heart sounds  No edema noted  Pulmonary/Chest: Effort normal and breath sounds normal  He has no wheezes  Musculoskeletal:   Gait wnl  Neurological: He is alert and oriented to person, place, and time  Skin: No rash noted  Psychiatric: He has a normal mood and affect  Vitals reviewed

## 2020-04-14 DIAGNOSIS — J45.20 MILD INTERMITTENT ASTHMA WITHOUT COMPLICATION: ICD-10-CM

## 2020-04-14 RX ORDER — ALBUTEROL SULFATE 90 UG/1
AEROSOL, METERED RESPIRATORY (INHALATION)
Qty: 8.5 INHALER | Refills: 0 | Status: SHIPPED | OUTPATIENT
Start: 2020-04-14

## 2020-05-10 DIAGNOSIS — J45.20 MILD INTERMITTENT ASTHMA WITHOUT COMPLICATION: ICD-10-CM

## 2020-06-14 DIAGNOSIS — E78.5 HYPERLIPIDEMIA, UNSPECIFIED HYPERLIPIDEMIA TYPE: ICD-10-CM

## 2020-06-15 RX ORDER — ROSUVASTATIN CALCIUM 10 MG/1
TABLET, COATED ORAL
Qty: 30 TABLET | Refills: 0 | Status: SHIPPED | OUTPATIENT
Start: 2020-06-15 | End: 2020-08-17

## 2020-07-12 DIAGNOSIS — E78.5 HYPERLIPIDEMIA, UNSPECIFIED HYPERLIPIDEMIA TYPE: ICD-10-CM

## 2020-07-13 RX ORDER — ROSUVASTATIN CALCIUM 10 MG/1
TABLET, COATED ORAL
Qty: 30 TABLET | Refills: 0 | OUTPATIENT
Start: 2020-07-13

## 2020-08-08 DIAGNOSIS — E78.5 HYPERLIPIDEMIA, UNSPECIFIED HYPERLIPIDEMIA TYPE: ICD-10-CM

## 2020-08-17 RX ORDER — ROSUVASTATIN CALCIUM 10 MG/1
TABLET, COATED ORAL
Qty: 15 TABLET | Refills: 0 | Status: SHIPPED | OUTPATIENT
Start: 2020-08-17 | End: 2021-09-22 | Stop reason: SDUPTHER

## 2020-09-21 ENCOUNTER — HOSPITAL ENCOUNTER (EMERGENCY)
Facility: HOSPITAL | Age: 45
Discharge: HOME | End: 2020-09-21
Attending: EMERGENCY MEDICINE
Payer: COMMERCIAL

## 2020-09-21 ENCOUNTER — APPOINTMENT (EMERGENCY)
Dept: RADIOLOGY | Facility: HOSPITAL | Age: 45
End: 2020-09-21
Payer: COMMERCIAL

## 2020-09-21 VITALS
HEART RATE: 97 BPM | TEMPERATURE: 99.1 F | OXYGEN SATURATION: 98 % | BODY MASS INDEX: 28.07 KG/M2 | WEIGHT: 200.5 LBS | HEIGHT: 71 IN | SYSTOLIC BLOOD PRESSURE: 122 MMHG | RESPIRATION RATE: 18 BRPM | DIASTOLIC BLOOD PRESSURE: 73 MMHG

## 2020-09-21 DIAGNOSIS — R05.9 COUGH: Primary | ICD-10-CM

## 2020-09-21 PROCEDURE — 99284 EMERGENCY DEPT VISIT MOD MDM: CPT

## 2020-09-21 PROCEDURE — 99284 EMERGENCY DEPT VISIT MOD MDM: CPT | Performed by: EMERGENCY MEDICINE

## 2020-09-21 PROCEDURE — 71045 X-RAY EXAM CHEST 1 VIEW: CPT

## 2020-09-21 PROCEDURE — U0003 INFECTIOUS AGENT DETECTION BY NUCLEIC ACID (DNA OR RNA); SEVERE ACUTE RESPIRATORY SYNDROME CORONAVIRUS 2 (SARS-COV-2) (CORONAVIRUS DISEASE [COVID-19]), AMPLIFIED PROBE TECHNIQUE, MAKING USE OF HIGH THROUGHPUT TECHNOLOGIES AS DESCRIBED BY CMS-2020-01-R: HCPCS | Performed by: EMERGENCY MEDICINE

## 2020-09-21 RX ORDER — PREDNISONE 20 MG/1
40 TABLET ORAL DAILY
Qty: 10 TABLET | Refills: 0 | Status: SHIPPED | OUTPATIENT
Start: 2020-09-21 | End: 2020-09-26

## 2020-09-21 RX ORDER — OXYMETAZOLINE HYDROCHLORIDE 0.05 G/100ML
2 SPRAY NASAL ONCE
Status: COMPLETED | OUTPATIENT
Start: 2020-09-21 | End: 2020-09-21

## 2020-09-21 RX ORDER — ALBUTEROL SULFATE 90 UG/1
2 AEROSOL, METERED RESPIRATORY (INHALATION) ONCE
Status: COMPLETED | OUTPATIENT
Start: 2020-09-21 | End: 2020-09-21

## 2020-09-21 RX ORDER — ACETAMINOPHEN 325 MG/1
975 TABLET ORAL ONCE
Status: COMPLETED | OUTPATIENT
Start: 2020-09-21 | End: 2020-09-21

## 2020-09-21 RX ADMIN — ACETAMINOPHEN 975 MG: 325 TABLET, FILM COATED ORAL at 11:37

## 2020-09-21 RX ADMIN — OXYMETAZOLINE HYDROCHLORIDE 2 SPRAY: 0.05 SPRAY NASAL at 11:37

## 2020-09-21 RX ADMIN — ALBUTEROL SULFATE 2 PUFF: 90 AEROSOL, METERED RESPIRATORY (INHALATION) at 11:37

## 2020-09-21 NOTE — Clinical Note
Lobito Duncan was seen and treated in our emergency department on 9/21/2020  Diagnosis: Cough, rule out COVID-19    Mahin Garcia  may return to work on return date  He may return on this date: 10/05/2020    A test for COVID-19 has been sent and will take several days to result  If that test is positive, Mahin Garcia may return to work on 5 October 2020 as long as symptoms have improved and he does not have a fever  If his coronavirus test returns negative, he may return to work as soon as the test result is available  If you have any questions or concerns, please don't hesitate to call        Elizabeth Faulkner MD    ______________________________           _______________          _______________  Hospital Representative                              Date                                Time

## 2020-09-21 NOTE — ED PROVIDER NOTES
Pt Name: Brendon Atwood  MRN: [id number]  [place] 1975  Age/Sex: 50 y o  male  Date of evaluation: 9/21/2020  PCP: Anna Pollack, [address] [place] [address]    Chief Complaint   Patient presents with    Cough     Symptoms since friday, taking tylenol sinus and cold  Called PCP and they recommended he come here for an eval  hx of bronchitis is a smoker  HPI    50 y o  male presenting with cough and chills  Patient states the symptoms began on Friday, became much more noticeable yesterday  He does have a history of asthma but states that this does not feel like his usual asthma  He has not measured fever but states he has had intermittent chills especially at night, also complains of a cough that is mostly dry but occasionally productive of small amounts of clear phlegm  Patient denies sick contacts, shortness of breath, trauma, other symptoms  He verbalizes concern for possible COVID-19 infection  HPI      Past Medical and Surgical History    Past Medical History:   Diagnosis Date    Asthma     Hyperlipidemia        History reviewed  No pertinent surgical history  Family History   Problem Relation Age of Onset    No Known Problems Father     Mental illness Mother        Social History     Tobacco Use    Smoking status: Current Some Day Smoker     Packs/day: 0 50     Types: Cigarettes    Smokeless tobacco: Never Used   Substance Use Topics    Alcohol use: Yes     Comment: occasional alcohol use    Drug use: No           Allergies    Allergies   Allergen Reactions    Penicillins        Home Medications    Prior to Admission medications    Medication Sig Start Date End Date Taking?  Authorizing Provider   albuterol (PROVENTIL HFA,VENTOLIN HFA) 90 mcg/act inhaler TAKE 2 PUFFS BY MOUTH EVERY 6 HOURS AS NEEDED FOR WHEEZE 4/14/20   TAI Last   clobetasol (TEMOVATE) 0 05 % cream Apply topically 2 (two) times a day 2/17/20   TAI Jacobs   Multiple Vitamins-Minerals (MULTIVITAMIN WITH MINERALS) tablet Take 1 tablet by mouth daily    Historical Provider, MD   Omega-3 Fatty Acids (OMEGA-3 FISH OIL) 1000 MG CAPS Take 2 capsules by mouth daily 5/1/17   Historical Provider, MD   rosuvastatin (CRESTOR) 10 MG tablet TAKE 1 TABLET BY MOUTH EVERY DAY 8/17/20   TAI Burton           Review of Systems    Review of Systems   Constitutional: Positive for chills  Negative for appetite change and diaphoresis  HENT: Negative for drooling, facial swelling, trouble swallowing and voice change  Respiratory: Positive for cough  Negative for apnea, shortness of breath and wheezing  Cardiovascular: Negative for chest pain and leg swelling  Gastrointestinal: Negative for abdominal distention, abdominal pain, diarrhea, nausea and vomiting  Genitourinary: Negative for dysuria and urgency  Musculoskeletal: Negative for arthralgias, back pain, gait problem and neck pain  Skin: Negative for color change, rash and wound  Neurological: Negative for seizures, speech difficulty, weakness and headaches  Psychiatric/Behavioral: Negative for agitation, behavioral problems and dysphoric mood  The patient is not nervous/anxious  All other systems reviewed and negative  Physical Exam      ED Triage Vitals   Temperature Pulse Respirations Blood Pressure SpO2   09/21/20 1113 09/21/20 1113 09/21/20 1113 09/21/20 1113 09/21/20 1113   99 1 °F (37 3 °C) 97 18 122/73 98 %      Temp Source Heart Rate Source Patient Position - Orthostatic VS BP Location FiO2 (%)   09/21/20 1113 09/21/20 1113 09/21/20 1113 09/21/20 1113 --   Tympanic Monitor Sitting Left arm       Pain Score       09/21/20 1137       7               Physical Exam  Vitals signs and nursing note reviewed  Constitutional:       Appearance: He is well-developed  HENT:      Head: Normocephalic and atraumatic  Nose: Congestion and rhinorrhea present     Eyes:      Conjunctiva/sclera: Conjunctivae normal       Pupils: Pupils are equal, round, and reactive to light  Neck:      Musculoskeletal: Normal range of motion and neck supple  Trachea: No tracheal deviation  Cardiovascular:      Rate and Rhythm: Normal rate and regular rhythm  Heart sounds: Normal heart sounds  No murmur  Pulmonary:      Effort: Pulmonary effort is normal  No respiratory distress  Breath sounds: Normal breath sounds  No stridor  No wheezing or rales  Abdominal:      General: There is no distension  Palpations: Abdomen is soft  Tenderness: There is no abdominal tenderness  There is no guarding or rebound  Musculoskeletal: Normal range of motion  General: No deformity  Skin:     General: Skin is warm and dry  Findings: No rash  Neurological:      Mental Status: He is alert and oriented to person, place, and time  Psychiatric:         Behavior: Behavior normal          Thought Content: Thought content normal          Judgment: Judgment normal               Diagnostic Results      Labs:    Results Reviewed     Procedure Component Value Units Date/Time    Novel Coronavirus (HXDCX-00), PCR [facility] [[id number]] Collected:  09/21/20 1137    Lab Status: In process Specimen:  Nares from Nose Updated:  09/21/20 1143          All labs reviewed and utilized in the medical decision making process    Radiology:    XR chest 1 view portable   ED Interpretation   No acute cardiopulmonary process or osseous abnormality  Final Result      No radiographic evidence of acute cardiopulmonary disease                Workstation performed: [id number]             All radiology studies independently viewed by me and interpreted by the radiologist     Procedure    Procedures        ED Course of Care and Re-Assessments      Symptoms improved with albuterol Afrin and Tylenol      Medications   albuterol (PROVENTIL HFA,VENTOLIN HFA) inhaler 2 puff (2 puffs Inhalation Given 9/21/20 1137) oxymetazoline (AFRIN) 0 05 % nasal spray 2 spray (2 sprays Each Nare Given 9/21/20 1137)   acetaminophen (TYLENOL) tablet 975 mg (975 mg Oral Given 9/21/20 1137)           FINAL IMPRESSION    Final diagnoses:   Cough         DISPOSITION/PLAN    Cough as above  Vital signs examination overall reassuring, chest x-ray likewise reassuring  Based on overall clinical course overall suspicious for viral upper respiratory infection and potential viral pneumonia, lower suspicion for acute asthma exacerbation but provided with wait and see prescription of prednisone  Treated symptomatically and COVID test obtained based on patient's clinical concern as well as history of travel across state lines for work  Informed of CDC guidelines regarding self isolation under the assumption that he is positive for COVID, discharged strict return precautions, follow up primary care doctor  Time reflects when diagnosis was documented in both MDM as applicable and the Disposition within this note     Time User Action Codes Description Comment    9/21/2020 12:11 PM Emigdio Raphael Add [R05] Cough       ED Disposition     ED Disposition Condition Date/Time Comment    Discharge Stable Mon Sep 21, 2020 12:11 PM Yony Burr discharge to home/self care              Follow-up Information     Follow up With Specialties Details Why Contact Info Additional [id number]  Emergency Department Emergency Medicine Go to  If symptoms worsen [address] [facility],[address] [address]  [facility] ED, [address], [address] [place], [address] [facility], Nurse Practitioner Call in 1 day To discuss your symptoms and further care as needed [address]  [facility] [phone]               PATIENT REFERRED TO:    KELTON [address] Emergency Department  [address],[address] [address] [phone]  Go to   If symptoms worsen    Ally Blanca Damion, [address] [address] [phone]    Call in 1 day  To discuss your symptoms and further care as needed      DISCHARGE MEDICATIONS:    Discharge Medication List as of 9/21/2020 12:15 PM      START taking these medications    Details   predniSONE 20 mg tablet Take 2 tablets (40 mg total) by mouth daily for 5 days, Starting Mon 9/21/2020, Until Sat 9/26/2020, Print         CONTINUE these medications which have NOT CHANGED    Details   albuterol (PROVENTIL HFA,VENTOLIN HFA) 90 mcg/act inhaler TAKE 2 PUFFS BY MOUTH EVERY 6 HOURS AS NEEDED FOR WHEEZE, Normal      clobetasol (TEMOVATE) 0 05 % cream Apply topically 2 (two) times a day, Starting Mon 2/17/2020, Normal      Multiple Vitamins-Minerals (MULTIVITAMIN WITH MINERALS) tablet Take 1 tablet by mouth daily, Historical Med      rosuvastatin (CRESTOR) 10 MG tablet TAKE 1 TABLET BY MOUTH EVERY DAY, Normal             No discharge procedures on file           MD Alina Esposito MD  09/21/20 [id number]

## 2020-09-21 NOTE — DISCHARGE INSTRUCTIONS

## 2020-09-22 ENCOUNTER — TELEPHONE (OUTPATIENT)
Dept: OTHER | Facility: OTHER | Age: 45
End: 2020-09-22

## 2020-09-22 LAB — SARS-COV-2 RNA SPEC QL NAA+PROBE: NOT DETECTED

## 2020-09-22 NOTE — TELEPHONE ENCOUNTER
The patient was called for notification of a test result for COVID-19  The patient did not answer the phone and a voicemail was left requesting a call back to [phone], Option 7  Advised that test results are available on My Chart

## 2020-09-23 NOTE — TELEPHONE ENCOUNTER
2nd attempt-  The patient was called for notification of a test result for COVID-19       The patient did not answer the phone and a voicemail was left requesting a call back to [phone], Option 7  Advised that test results are available on My Chart

## 2021-09-02 ENCOUNTER — OFFICE VISIT (OUTPATIENT)
Dept: FAMILY MEDICINE CLINIC | Facility: CLINIC | Age: 46
End: 2021-09-02
Payer: COMMERCIAL

## 2021-09-02 VITALS
HEART RATE: 69 BPM | DIASTOLIC BLOOD PRESSURE: 80 MMHG | OXYGEN SATURATION: 95 % | SYSTOLIC BLOOD PRESSURE: 134 MMHG | RESPIRATION RATE: 16 BRPM | BODY MASS INDEX: 26.88 KG/M2 | WEIGHT: 192 LBS | HEIGHT: 71 IN

## 2021-09-02 DIAGNOSIS — F10.920 ALCOHOLIC INTOXICATION WITHOUT COMPLICATION (HCC): ICD-10-CM

## 2021-09-02 DIAGNOSIS — R55 SYNCOPE, UNSPECIFIED SYNCOPE TYPE: Primary | ICD-10-CM

## 2021-09-02 DIAGNOSIS — F17.200 CURRENT SMOKER: ICD-10-CM

## 2021-09-02 PROBLEM — S61.312A LACERATION OF RIGHT MIDDLE FINGER WITHOUT FOREIGN BODY WITH DAMAGE TO NAIL: Status: ACTIVE | Noted: 2019-04-01

## 2021-09-02 PROBLEM — S62.632B OPEN DISPLACED FRACTURE OF DISTAL PHALANX OF RIGHT MIDDLE FINGER: Status: ACTIVE | Noted: 2019-04-01

## 2021-09-02 PROCEDURE — 99214 OFFICE O/P EST MOD 30 MIN: CPT | Performed by: FAMILY MEDICINE

## 2021-09-02 NOTE — PROGRESS NOTES
Assessment/Plan:   Diagnoses and all orders for this visit:    Syncope, unspecified syncope type  -     Ambulatory referral to Cardiology; Future  - pt presents for ER f/u - was taken to [facility] ER on 8/28 after "3 short syncopal episodes"   - CT head: "No acute intracranial abnormality  Specifically, no acute intracranial hemorrhage or acute territorial infarction  "  - CT Cspine - "No acute fracture or other acute process of the cervical spine"  - CXR: nml   - EKG: NSR per Dr Connor Barrow read in the ER   - nml troponins x2  - nml CBC, DDimer, lipase, CMP, UDS  -    - UA with ketones and noted to have ethanol level of 187   - could be 2/2 heat/acute alcohol intox and dehydration - but recommend Cardiac eval - pt and wife would like referral to [facility] and per pt, he had a full cardiac w/u 3yrs ago - referral given   - of note, overdue for annual exam and routine health maintenance - advised to f/u with PCP     Alcoholic intoxication without complication ([facility] [address] )  - on ave drinks 3beers/night - advised to cut back     Current smoker  - smokes 1PPD/34yrs - contemplating quitting   - f/u with PCP   - STRONGLY ADVISED TO GET COVID IMMs - pt and wife will think about it           Subjective:    Patient ID: Gavino Arguello is a 48 y o  male  HPI   44yo M presents to the office with his wife for ER f/u   - was at a birthday party for his son on 8/28, had 6 beers (Nelly Danes), was playing with grandkids - all of a sudden pt was in a kneeling position and family was trying to wake him up as he was unresponsive   - EMS was called - did a sternal rub   - had 3 short syncopal episodes  - was taken to [facility] for eval   - CT head: "No acute intracranial abnormality  Specifically, no acute intracranial hemorrhage or acute territorial infarction   "  - CT Cspine - "No acute fracture or other acute process of the cervical spine"  - CXR: nml   - EKG: NSR per Dr Connor Barrow read in the ER   - nml troponins x2  - nml CBC, DDimer, lipase, CMP, UDS  -    - UA with ketones and noted to have ethanol level of 187   - nml exam  - pt was advised to f/u with Cardio - pt and wife would like referral to [facility] and per pt, he had a full cardiac w/u 3yrs ago   - on ave drinks 3drinks/day - but has not drank since 8/28  - smokes 1PPD/34yrs - contemplating quitting   - denies F/C/N/V/CP/palpitations/SOB/wheezing/abd pain/LE edema   - has not received COVID IMMs       The following portions of the patient's history were reviewed and updated as appropriate: allergies, current medications, past family history, past medical history, past social history, past surgical history and problem list     Review of Systems  as per HPI    Objective:  /80 (BP Location: Left arm, Patient Position: Sitting, Cuff Size: Standard)   Pulse 69   Resp 16   Ht 5' 11" (1 803 m)   Wt 87 1 kg (192 lb)   SpO2 95%   BMI 26 78 kg/m²    Physical Exam  Vitals reviewed  Constitutional:       General: He is not in acute distress  Appearance: Normal appearance  He is not ill-appearing, toxic-appearing or diaphoretic  HENT:      Head: Normocephalic and atraumatic  Right Ear: External ear normal       Left Ear: External ear normal    Eyes:      General: No scleral icterus  Right eye: No discharge  Left eye: No discharge  Extraocular Movements: Extraocular movements intact  Conjunctiva/sclera: Conjunctivae normal    Cardiovascular:      Rate and Rhythm: Normal rate and regular rhythm  Heart sounds: Normal heart sounds  No murmur heard  No friction rub  No gallop  Pulmonary:      Effort: Pulmonary effort is normal  No respiratory distress  Breath sounds: Normal breath sounds  No stridor  No wheezing, rhonchi or rales  Abdominal:      General: Bowel sounds are normal       Palpations: Abdomen is soft  Musculoskeletal:         General: Normal range of motion  Cervical back: Neck supple        Right lower leg: No edema  Left lower leg: No edema  Skin:     General: Skin is warm  Neurological:      General: No focal deficit present  Mental Status: He is alert and oriented to person, place, and time  Psychiatric:         Mood and Affect: Affect normal  Mood is anxious  Behavior: Behavior normal          BMI Counseling: Body mass index is 26 78 kg/m²  The BMI is above normal  Nutrition recommendations include reducing portion sizes and decreasing overall calorie intake  Exercise recommendations include moderate aerobic physical activity for 150 minutes/week, exercising 3-5 times per week, joining a gym and strength training exercises

## 2021-09-22 ENCOUNTER — CONSULT (OUTPATIENT)
Dept: CARDIOLOGY CLINIC | Facility: CLINIC | Age: 46
End: 2021-09-22
Payer: COMMERCIAL

## 2021-09-22 VITALS
HEART RATE: 71 BPM | WEIGHT: 198 LBS | OXYGEN SATURATION: 98 % | TEMPERATURE: 98.2 F | SYSTOLIC BLOOD PRESSURE: 120 MMHG | DIASTOLIC BLOOD PRESSURE: 76 MMHG | BODY MASS INDEX: 27.72 KG/M2 | HEIGHT: 71 IN

## 2021-09-22 DIAGNOSIS — R42 LIGHTHEADEDNESS: ICD-10-CM

## 2021-09-22 DIAGNOSIS — J45.20 MILD INTERMITTENT ASTHMA WITHOUT COMPLICATION: ICD-10-CM

## 2021-09-22 DIAGNOSIS — E78.2 MIXED HYPERLIPIDEMIA: ICD-10-CM

## 2021-09-22 DIAGNOSIS — R55 SYNCOPE, UNSPECIFIED SYNCOPE TYPE: ICD-10-CM

## 2021-09-22 DIAGNOSIS — Z72.0 TOBACCO ABUSE: ICD-10-CM

## 2021-09-22 DIAGNOSIS — E78.5 HYPERLIPIDEMIA, UNSPECIFIED HYPERLIPIDEMIA TYPE: ICD-10-CM

## 2021-09-22 DIAGNOSIS — R73.01 ELEVATED FASTING BLOOD SUGAR: ICD-10-CM

## 2021-09-22 PROCEDURE — 93000 ELECTROCARDIOGRAM COMPLETE: CPT | Performed by: INTERNAL MEDICINE

## 2021-09-22 PROCEDURE — 99205 OFFICE O/P NEW HI 60 MIN: CPT | Performed by: INTERNAL MEDICINE

## 2021-09-22 PROCEDURE — 3008F BODY MASS INDEX DOCD: CPT | Performed by: INTERNAL MEDICINE

## 2021-09-22 PROCEDURE — 4004F PT TOBACCO SCREEN RCVD TLK: CPT | Performed by: INTERNAL MEDICINE

## 2021-09-22 RX ORDER — ROSUVASTATIN CALCIUM 10 MG/1
10 TABLET, COATED ORAL DAILY
Qty: 30 TABLET | Refills: 5 | Status: SHIPPED | OUTPATIENT
Start: 2021-09-22 | End: 2022-06-20 | Stop reason: SDUPTHER

## 2021-09-22 NOTE — PROGRESS NOTES
Consultation - Cardiology Office  [facility]  Fabiola Weller 48 y o  male MRN: [id number]  : 1975  Unit/Bed#:  Encounter: [id number]      Assessment:     1  Syncope, unspecified syncope type    2  Lightheadedness    3  Mixed hyperlipidemia    4  Mild intermittent asthma without complication    5  Tobacco abuse    6  Elevated fasting blood sugar    7  Hyperlipidemia, unspecified hyperlipidemia type        Discussion summary and Plan:    1  Syncope  Patient has episode of syncope he admits to drinking excessive amount of alcohol and his level was very high  Blood work reviewed he has elevated call level high elevated sodium level and a anion gap was positive  Most likely etiology appears to be related to dehydration secondary to excessive drinking  Troponins were negative however need to rule out cardiac arrhythmias will check Holter monitor, echo Doppler and exercise stress test     2  Dyslipidemia  Patient used to have very high cholesterol his LDL was 2 O2 he was on Crestor which he stopped after extensive discussion he is willing to take it will start 10 mg daily and check fasting lipid and LFTs and TSH in 4-6 weeks  3  History of asthma  Currently not wheezing advised him to quit smoking he understand it very well that smoking could lead to worsening of asthma even cardiovascular events  4  Tobacco abuse  Smokes about pack a day for the last 35 years advised to quit smoking  Discussed with patient's wife    5  History of elevated blood sugar  Follow-up with primary care scheduled to have blood test    Plan  Echo Doppler  Exercise stress test    Holter monitor  Restarted back on his cholesterol medication and check blood work    Lifestyle modification strongly recommended including cutting back alcohol intake and quitting smoking  He need to keep himself hydrated  Further plan as also of these tests become available  Thank you for your consultation    If you have any question please call me at [phone]    Counseling :  A description of the counseling  Goals and Barriers  Patient's ability to self care: Yes  Medication side effect reviewed with patient in detail and all their questions answered to their satisfaction  Primary Care Physician Requesting Consult: Stacie Son, [address]    Reason for Consult / Principal Problem:  Syncopal episode        HPI :     Cesar Carvajal is a 48y o  year old male who was referred by primary care doctor for syncope  It happened about 3-4 weeks ago  As per patient who came with his wife it was his son's birthday party and they were having a party when he believes he drank lot more alcohol including 7 beers and some moonshine alcohol  He does not remember how much he drank but after that he was dizzy lightheaded and he passed out  He has workup done which included CT scan was acceptable troponins were negative and his alcohol level was 187 he had a non gap and his sodium was elevated  He was given fluids and was sent home and advised to follow-up with his primary care doctor  He does admit now drinking but he has significantly cut back  And he still smokes about pack a day  He does have history of dyslipidemia with LDL level of 2 O2 and was on Crestor which he stopped as it was costing him some money  No chest pain now no shortness of breath he was dizzy lightheaded at that time  Family history  Does no family history of premature coronary artery disease    Social history  Patient is  lives with his wife he has kids  He smokes about pack a day and has been smoking for for about 25 years  Past surgical history history of hernia surgery    Review of Systems   Constitutional: Negative for activity change, chills, diaphoresis, fever and unexpected weight change  HENT: Negative for congestion  Eyes: Negative for discharge and redness     Respiratory: Negative for cough, chest tightness, shortness of breath and wheezing  Cardiovascular: Negative  Negative for chest pain, palpitations and leg swelling  Gastrointestinal: Negative for abdominal pain, diarrhea and nausea  Endocrine: Negative  Genitourinary: Negative for decreased urine volume and urgency  Musculoskeletal: Negative  Negative for arthralgias, back pain and gait problem  Skin: Negative for rash and wound  Allergic/Immunologic: Negative  Neurological: Negative for dizziness, seizures, syncope, weakness, light-headedness and headaches  Occasional episodes of dizziness   Hematological: Negative  Psychiatric/Behavioral: Negative for agitation and confusion  The patient is not nervous/anxious  Historical Information   Past Medical History:   Diagnosis Date    Asthma     Hyperlipidemia      History reviewed  No pertinent surgical history    Social History     Substance and Sexual Activity   Alcohol Use Yes    Alcohol/week: 21 0 standard drinks    Types: 21 Cans of beer per week    Comment: occ     Social History     Substance and Sexual Activity   Drug Use No     Social History     Tobacco Use   Smoking Status Current Every Day Smoker    Packs/day: 1 00    Years: 34 00    Pack years: 34 00    Types: Cigarettes   Smokeless Tobacco Never Used     Family History:   Family History   Problem Relation Age of Onset    No Known Problems Father     Mental illness Mother        Meds/Allergies     Allergies   Allergen Reactions    Penicillins        Current Outpatient Medications:     albuterol (PROVENTIL HFA,VENTOLIN HFA) 90 mcg/act inhaler, TAKE 2 PUFFS BY MOUTH EVERY 6 HOURS AS NEEDED FOR WHEEZE, Disp: 8 5 Inhaler, Rfl: 0    Multiple Vitamins-Minerals (MULTIVITAMIN WITH MINERALS) tablet, Take 1 tablet by mouth daily, Disp: , Rfl:     rosuvastatin (CRESTOR) 10 MG tablet, Take 1 tablet (10 mg total) by mouth daily, Disp: 30 tablet, Rfl: 5    Vitals: Blood pressure 120/76, pulse 71, temperature 98 2 °F (36 8 °C), temperature source Temporal, height 5' 11" (1 803 m), weight 89 8 kg (198 lb), SpO2 98 %  Body mass index is 27 62 kg/m²  Wt Readings from Last 3 Encounters:   09/22/21 89 8 kg (198 lb)   09/02/21 87 1 kg (192 lb)   09/21/20 90 9 kg (200 lb 8 oz)     Vitals:    09/22/21 1505   Weight: 89 8 kg (198 lb)     BP Readings from Last 3 Encounters:   09/22/21 120/76   09/02/21 134/80   09/21/20 122/73         Physical Exam    Neurologic:  Alert & oriented x 3, no new focal deficits, Not in any acute distress,  Constitutional:  Well developed, well nourished, non-toxic appearance   Eyes:  Pupil equal and reacting to light, conjunctiva normal, No JVP, No LNP   HENT:  Atraumatic, oropharynx moist, Neck- normal range of motion, no tenderness,  Neck supple   Respiratory:  Bilateral air entry, mostly clear to auscultation  Cardiovascular: S1-S2 regular with a 2/6 ejection systolic murmur and S4 is present  GI:  Soft, nondistended, normal bowel sounds, nontender, no hepatosplenomegaly appreciated  Musculoskeletal:  No edema, no tenderness, no deformities  Skin:  Well hydrated, no rash   Lymphatic:  No lymphadenopathy noted   Extremities:  No edema and distal pulses are present    Diagnostic Studies Review Cardio:  None recent    EKG:  Lead EKG 09/22/2021 shows normal sinus rhythm heart rate 71 beats per minute normal intervals    Incomplete RBBB versus RSR pattern noted          Lab Review   Lab Results   Component Value Date    WBC 6 7 02/21/2020    HGB 15 8 02/21/2020    HCT 45 6 02/21/2020    MCV 95 8 02/21/2020    RDW 12 6 02/21/2020     02/21/2020     BMP:  Lab Results   Component Value Date    SODIUM 141 02/21/2020    K 4 5 02/21/2020     02/21/2020    CO2 31 02/21/2020    BUN 23 02/21/2020    CREATININE 1 18 02/21/2020    GLUC 104 (H) 02/21/2020    CALCIUM 9 5 02/21/2020    EGFR 104 01/02/2019     LFT:  Lab Results   Component Value Date    AST 18 02/21/2020    ALT 30 02/21/2020    ALKPHOS 50 02/21/2020    TP 7 0 02/21/2020    ALB 4 7 02/21/2020      Lab Results   Component Value Date    QFS4PJVFETNL 2 54 04/26/2017     Lipid Profile:   Lab Results   Component Value Date    CHOLESTEROL 276 (H) 02/21/2020    HDL 37 (L) 02/21/2020    LDLCALC 202 (H) 02/21/2020    TRIG 194 (H) 02/21/2020     Lab Results   Component Value Date    CHOLESTEROL 276 (H) 02/21/2020     Lab Results   Component Value Date    TROPONINI <0 02 01/02/2019     Lab Results   Component Value Date    NTBNP 22 09/19/2016      Hemoglobin 12 5 - 17 0 g/dL 14 8        Hematocrit 37 0 - 48 0 % 42 6        WBC 4 0 - 10 5 thou/cmm 7 5        RBC 4 00 - 5 40 mill/cmm 4 37        Platelet Count 506 - 350 thou/cmm 220        MPV 7 5 - 11 3 fL 8 8        MCV 80 - 100 fL 98        MCH 27 0 - 36 0 pg 33 8        MCHC 32 0 - 37 0 g/dL 34 7        RDW 12 0 - 16 0 % 13 0        Differential Type   AUTO        Absolute Neutrophils 1 8 - 7 8 thou/cmm 3 5        Absolute Lymphocytes 1 0 - 3 0 thou/cmm 3 2High         Absolute Monocytes 0 3 - 1 0 thou/cmm 0 4        Absolute Eosinophils 0 0 - 0 5 thou/cmm 0 3       99           BUN 7 - 28 mg/dL 18          Creatinine 0 53 - 1 30 mg/dL 0 85          Sodium 135 - 145 mmol/L 148High           Potassium 3 5 - 5 2 mmol/L 3 6          Chloride 100 - 109 mmol/L 114High           Carbon Dioxide 23 - 31 mmol/L 21Low           Calcium 8 5 - 10 1 mg/dL 8 8          Alkaline Phosphatase 35 - 120 U/L 55          Albumin 3 5 - 4 8 g/dL 4 1          Bilirubin, Total 0 2 - 1 0 mg/dL 0 3  Use of this assay is not recommended for patients undergoing treatment with eltrombopag due to the potential for falsely elevated results          Protein, Total 6 3 - 8 3 g/dL 7 2          AST <41 U/L 14          ALT <56 U/L 29          Anion Gap 3 - 11  13High           eGFR, Non- >60  103          eGFR,  >60  119       Ethanol Negative mg/dL 187Abnormal           Dr Claudean Mae, MD [facility]      "This note has been constructed using a voice recognition system  Therefore there may be syntax, spelling, and/or grammatical errors   Please call if you have any questions  "

## 2021-11-01 ENCOUNTER — OFFICE VISIT (OUTPATIENT)
Dept: FAMILY MEDICINE CLINIC | Facility: CLINIC | Age: 46
End: 2021-11-01
Payer: COMMERCIAL

## 2021-11-01 VITALS
HEIGHT: 71 IN | SYSTOLIC BLOOD PRESSURE: 118 MMHG | WEIGHT: 197 LBS | DIASTOLIC BLOOD PRESSURE: 74 MMHG | OXYGEN SATURATION: 96 % | RESPIRATION RATE: 16 BRPM | BODY MASS INDEX: 27.58 KG/M2 | HEART RATE: 71 BPM

## 2021-11-01 DIAGNOSIS — Z00.00 ANNUAL PHYSICAL EXAM: Primary | ICD-10-CM

## 2021-11-01 DIAGNOSIS — L40.9 PSORIASIS: ICD-10-CM

## 2021-11-01 DIAGNOSIS — E66.3 OVERWEIGHT (BMI 25.0-29.9): ICD-10-CM

## 2021-11-01 DIAGNOSIS — R73.01 ELEVATED FASTING BLOOD SUGAR: ICD-10-CM

## 2021-11-01 DIAGNOSIS — R35.0 URINARY FREQUENCY: ICD-10-CM

## 2021-11-01 DIAGNOSIS — E78.5 HYPERLIPIDEMIA, UNSPECIFIED HYPERLIPIDEMIA TYPE: ICD-10-CM

## 2021-11-01 PROBLEM — S62.632B OPEN DISPLACED FRACTURE OF DISTAL PHALANX OF RIGHT MIDDLE FINGER: Status: RESOLVED | Noted: 2019-04-01 | Resolved: 2021-11-01

## 2021-11-01 PROBLEM — S61.312A LACERATION OF RIGHT MIDDLE FINGER WITHOUT FOREIGN BODY WITH DAMAGE TO NAIL: Status: RESOLVED | Noted: 2019-04-01 | Resolved: 2021-11-01

## 2021-11-01 PROCEDURE — 99396 PREV VISIT EST AGE 40-64: CPT | Performed by: NURSE PRACTITIONER

## 2021-11-01 PROCEDURE — 3008F BODY MASS INDEX DOCD: CPT | Performed by: NURSE PRACTITIONER

## 2021-11-01 PROCEDURE — 4004F PT TOBACCO SCREEN RCVD TLK: CPT | Performed by: NURSE PRACTITIONER

## 2021-11-01 PROCEDURE — 3725F SCREEN DEPRESSION PERFORMED: CPT | Performed by: NURSE PRACTITIONER

## 2021-11-01 RX ORDER — CLOBETASOL PROPIONATE 0.5 MG/G
CREAM TOPICAL 2 TIMES DAILY
Qty: 15 G | Refills: 0 | Status: SHIPPED | OUTPATIENT
Start: 2021-11-01 | End: 2022-06-20 | Stop reason: SDUPTHER

## 2021-11-01 RX ORDER — ASPIRIN 81 MG/1
81 TABLET, CHEWABLE ORAL DAILY
COMMUNITY

## 2021-11-09 ENCOUNTER — TELEPHONE (OUTPATIENT)
Dept: CARDIOLOGY CLINIC | Facility: CLINIC | Age: 46
End: 2021-11-09

## 2021-11-09 ENCOUNTER — HOSPITAL ENCOUNTER (OUTPATIENT)
Dept: NON INVASIVE DIAGNOSTICS | Facility: HOSPITAL | Age: 46
Discharge: HOME | End: 2021-11-09
Attending: INTERNAL MEDICINE
Payer: COMMERCIAL

## 2021-11-09 VITALS
WEIGHT: 198 LBS | BODY MASS INDEX: 27.72 KG/M2 | HEART RATE: 68 BPM | SYSTOLIC BLOOD PRESSURE: 120 MMHG | DIASTOLIC BLOOD PRESSURE: 74 MMHG | HEIGHT: 71 IN

## 2021-11-09 DIAGNOSIS — R73.01 ELEVATED FASTING BLOOD SUGAR: ICD-10-CM

## 2021-11-09 DIAGNOSIS — R55 SYNCOPE, UNSPECIFIED SYNCOPE TYPE: ICD-10-CM

## 2021-11-09 DIAGNOSIS — E78.2 MIXED HYPERLIPIDEMIA: ICD-10-CM

## 2021-11-09 DIAGNOSIS — R42 LIGHTHEADEDNESS: ICD-10-CM

## 2021-11-09 DIAGNOSIS — J45.20 MILD INTERMITTENT ASTHMA WITHOUT COMPLICATION: ICD-10-CM

## 2021-11-09 DIAGNOSIS — Z72.0 TOBACCO ABUSE: ICD-10-CM

## 2021-11-09 LAB
BASELINE ST DEPRESSION: 0 MM
CHEST PAIN STATEMENT: NORMAL
MAX DIASTOLIC BP: 80 MMHG
MAX HEART RATE: 129 BPM
MAX HR PERCENT: 75 %
MAX HR: 172 BPM
MAX PREDICTED HEART RATE: 172 BPM
MAX. SYSTOLIC BP: 180 MMHG
PROTOCOL NAME: NORMAL
RATE PRESSURE PRODUCT: NORMAL
REASON FOR TERMINATION: NORMAL
SL CV LV EF: 55
SL CV STRESS RECOVERY BP: NORMAL MMHG
SL CV STRESS RECOVERY HR: 78 BPM
SL CV STRESS RECOVERY O2 SAT: 98 %
SL CV STRESS STAGE REACHED: 4
STRESS ANGINA INDEX: 0
STRESS BASELINE BP: NORMAL MMHG
STRESS BASELINE HR: 61 BPM
STRESS DUKE TREADMILL SCORE: 10
STRESS O2 SAT REST: 98 %
STRESS PEAK HR: 129 BPM
STRESS PERCENT HR: 75 %
STRESS POST ESTIMATED WORKLOAD: 13.2 METS
STRESS POST EXERCISE DUR MIN: 9 MIN
STRESS POST EXERCISE DUR SEC: 57 SEC
STRESS POST O2 SAT PEAK: 99 %
STRESS POST PEAK BP: 180 MMHG
STRESS ST DEPRESSION: 0 MM
STRESS TARGET HR: 129 BPM
TARGET HR FORMULA: NORMAL
TEST INDICATION: NORMAL
TIME IN EXERCISE PHASE: NORMAL

## 2021-11-09 PROCEDURE — 93225 XTRNL ECG REC<48 HRS REC: CPT

## 2021-11-09 PROCEDURE — 93306 TTE W/DOPPLER COMPLETE: CPT | Performed by: INTERNAL MEDICINE

## 2021-11-09 PROCEDURE — 93016 CV STRESS TEST SUPVJ ONLY: CPT | Performed by: INTERNAL MEDICINE

## 2021-11-09 PROCEDURE — 93226 XTRNL ECG REC<48 HR SCAN A/R: CPT

## 2021-11-09 PROCEDURE — 93017 CV STRESS TEST TRACING ONLY: CPT

## 2021-11-09 PROCEDURE — 93306 TTE W/DOPPLER COMPLETE: CPT

## 2021-11-09 PROCEDURE — 93018 CV STRESS TEST I&R ONLY: CPT | Performed by: INTERNAL MEDICINE

## 2021-11-11 ENCOUNTER — TELEPHONE (OUTPATIENT)
Dept: CARDIOLOGY CLINIC | Facility: CLINIC | Age: 46
End: 2021-11-11

## 2021-11-14 LAB
ALBUMIN SERPL-MCNC: 4.7 G/DL (ref 3.6–5.1)
ALBUMIN/GLOB SERPL: 2 (CALC) (ref 1–2.5)
ALP SERPL-CCNC: 64 U/L (ref 36–130)
ALT SERPL-CCNC: 37 U/L (ref 9–46)
AST SERPL-CCNC: 24 U/L (ref 10–40)
BASOPHILS # BLD AUTO: 51 CELLS/UL (ref 0–200)
BASOPHILS NFR BLD AUTO: 0.7 %
BILIRUB SERPL-MCNC: 0.5 MG/DL (ref 0.2–1.2)
BUN SERPL-MCNC: 26 MG/DL (ref 7–25)
BUN/CREAT SERPL: 30 (CALC) (ref 6–22)
CALCIUM SERPL-MCNC: 9.7 MG/DL (ref 8.6–10.3)
CHLORIDE SERPL-SCNC: 109 MMOL/L (ref 98–110)
CO2 SERPL-SCNC: 24 MMOL/L (ref 20–32)
CREAT SERPL-MCNC: 0.86 MG/DL (ref 0.6–1.35)
EOSINOPHIL # BLD AUTO: 263 CELLS/UL (ref 15–500)
EOSINOPHIL NFR BLD AUTO: 3.6 %
ERYTHROCYTE [DISTWIDTH] IN BLOOD BY AUTOMATED COUNT: 12.9 % (ref 11–15)
EST. AVERAGE GLUCOSE BLD GHB EST-MCNC: 97 (CALC)
EST. AVERAGE GLUCOSE BLD GHB EST-SCNC: 5.4 (CALC)
GLOBULIN SER CALC-MCNC: 2.4 G/DL (CALC) (ref 1.9–3.7)
GLUCOSE SERPL-MCNC: 102 MG/DL (ref 65–99)
HBA1C MFR BLD: 5 % OF TOTAL HGB
HCT VFR BLD AUTO: 43.1 % (ref 38.5–50)
HGB BLD-MCNC: 15.1 G/DL (ref 13.2–17.1)
LYMPHOCYTES # BLD AUTO: 1978 CELLS/UL (ref 850–3900)
LYMPHOCYTES NFR BLD AUTO: 27.1 %
MCH RBC QN AUTO: 33.9 PG (ref 27–33)
MCHC RBC AUTO-ENTMCNC: 35 G/DL (ref 32–36)
MCV RBC AUTO: 96.9 FL (ref 80–100)
MONOCYTES # BLD AUTO: 533 CELLS/UL (ref 200–950)
MONOCYTES NFR BLD AUTO: 7.3 %
NEUTROPHILS # BLD AUTO: 4475 CELLS/UL (ref 1500–7800)
NEUTROPHILS NFR BLD AUTO: 61.3 %
PLATELET # BLD AUTO: 250 THOUSAND/UL (ref 140–400)
PMV BLD REES-ECKER: 11.2 FL (ref 7.5–12.5)
POTASSIUM SERPL-SCNC: 4.3 MMOL/L (ref 3.5–5.3)
PROT SERPL-MCNC: 7.1 G/DL (ref 6.1–8.1)
RBC # BLD AUTO: 4.45 MILLION/UL (ref 4.2–5.8)
SL AMB EGFR AFRICAN AMERICAN: 119 ML/MIN/1.73M2
SL AMB EGFR NON AFRICAN AMERICAN: 103 ML/MIN/1.73M2
SODIUM SERPL-SCNC: 140 MMOL/L (ref 135–146)
WBC # BLD AUTO: 7.3 THOUSAND/UL (ref 3.8–10.8)

## 2021-11-16 PROCEDURE — 93227 XTRNL ECG REC<48 HR R&I: CPT | Performed by: INTERNAL MEDICINE

## 2021-11-17 ENCOUNTER — TELEPHONE (OUTPATIENT)
Dept: CARDIOLOGY CLINIC | Facility: CLINIC | Age: 46
End: 2021-11-17

## 2021-12-06 ENCOUNTER — OFFICE VISIT (OUTPATIENT)
Dept: FAMILY MEDICINE CLINIC | Facility: CLINIC | Age: 46
End: 2021-12-06
Payer: COMMERCIAL

## 2021-12-06 VITALS
HEIGHT: 71 IN | HEART RATE: 86 BPM | SYSTOLIC BLOOD PRESSURE: 126 MMHG | OXYGEN SATURATION: 97 % | BODY MASS INDEX: 28 KG/M2 | RESPIRATION RATE: 16 BRPM | WEIGHT: 200 LBS | DIASTOLIC BLOOD PRESSURE: 82 MMHG

## 2021-12-06 DIAGNOSIS — E78.5 HYPERLIPIDEMIA, UNSPECIFIED HYPERLIPIDEMIA TYPE: ICD-10-CM

## 2021-12-06 DIAGNOSIS — R35.0 URINARY FREQUENCY: ICD-10-CM

## 2021-12-06 DIAGNOSIS — E66.3 OVERWEIGHT (BMI 25.0-29.9): ICD-10-CM

## 2021-12-06 DIAGNOSIS — L40.9 PSORIASIS: ICD-10-CM

## 2021-12-06 DIAGNOSIS — M54.2 NECK PAIN: Primary | ICD-10-CM

## 2021-12-06 PROCEDURE — 3008F BODY MASS INDEX DOCD: CPT | Performed by: NURSE PRACTITIONER

## 2021-12-06 PROCEDURE — 4004F PT TOBACCO SCREEN RCVD TLK: CPT | Performed by: NURSE PRACTITIONER

## 2021-12-06 PROCEDURE — 99214 OFFICE O/P EST MOD 30 MIN: CPT | Performed by: NURSE PRACTITIONER

## 2021-12-06 PROCEDURE — 3725F SCREEN DEPRESSION PERFORMED: CPT | Performed by: NURSE PRACTITIONER

## 2021-12-23 DIAGNOSIS — Z03.818 ENCOUNTER FOR OBSERVATION FOR SUSPECTED EXPOSURE TO OTHER BIOLOGICAL AGENTS RULED OUT: Primary | ICD-10-CM

## 2021-12-23 PROCEDURE — U0005 INFEC AGEN DETEC AMPLI PROBE: HCPCS | Performed by: NURSE PRACTITIONER

## 2021-12-23 PROCEDURE — U0003 INFECTIOUS AGENT DETECTION BY NUCLEIC ACID (DNA OR RNA); SEVERE ACUTE RESPIRATORY SYNDROME CORONAVIRUS 2 (SARS-COV-2) (CORONAVIRUS DISEASE [COVID-19]), AMPLIFIED PROBE TECHNIQUE, MAKING USE OF HIGH THROUGHPUT TECHNOLOGIES AS DESCRIBED BY CMS-2020-01-R: HCPCS | Performed by: NURSE PRACTITIONER

## 2022-06-20 ENCOUNTER — OFFICE VISIT (OUTPATIENT)
Dept: FAMILY MEDICINE CLINIC | Facility: CLINIC | Age: 47
End: 2022-06-20
Payer: COMMERCIAL

## 2022-06-20 VITALS
BODY MASS INDEX: 27.8 KG/M2 | HEIGHT: 71 IN | RESPIRATION RATE: 16 BRPM | OXYGEN SATURATION: 97 % | WEIGHT: 198.6 LBS | DIASTOLIC BLOOD PRESSURE: 82 MMHG | SYSTOLIC BLOOD PRESSURE: 130 MMHG | HEART RATE: 72 BPM

## 2022-06-20 DIAGNOSIS — Z12.11 SCREENING FOR COLON CANCER: ICD-10-CM

## 2022-06-20 DIAGNOSIS — E78.5 HYPERLIPIDEMIA, UNSPECIFIED HYPERLIPIDEMIA TYPE: Primary | ICD-10-CM

## 2022-06-20 DIAGNOSIS — E66.3 OVERWEIGHT (BMI 25.0-29.9): ICD-10-CM

## 2022-06-20 DIAGNOSIS — L40.9 PSORIASIS: ICD-10-CM

## 2022-06-20 DIAGNOSIS — R35.0 URINARY FREQUENCY: ICD-10-CM

## 2022-06-20 PROCEDURE — 3008F BODY MASS INDEX DOCD: CPT | Performed by: NURSE PRACTITIONER

## 2022-06-20 PROCEDURE — 4004F PT TOBACCO SCREEN RCVD TLK: CPT | Performed by: NURSE PRACTITIONER

## 2022-06-20 PROCEDURE — 99214 OFFICE O/P EST MOD 30 MIN: CPT | Performed by: NURSE PRACTITIONER

## 2022-06-20 RX ORDER — ROSUVASTATIN CALCIUM 10 MG/1
10 TABLET, COATED ORAL DAILY
Qty: 30 TABLET | Refills: 0 | Status: SHIPPED | OUTPATIENT
Start: 2022-06-20 | End: 2022-07-25 | Stop reason: SDUPTHER

## 2022-06-20 RX ORDER — CLOBETASOL PROPIONATE 0.5 MG/G
CREAM TOPICAL 2 TIMES DAILY
Qty: 15 G | Refills: 0 | Status: SHIPPED | OUTPATIENT
Start: 2022-06-20

## 2022-06-20 NOTE — PROGRESS NOTES
Assessment/Plan:      Diagnoses and all orders for this visit:    Hyperlipidemia, unspecified hyperlipidemia type  -     rosuvastatin (CRESTOR) 10 MG tablet; Take 1 tablet (10 mg total) by mouth daily  -     Comprehensive metabolic panel; Future  -     CBC and differential; Future  -     TSH, 3rd generation with Free T4 reflex; Future  -     Lipid Panel with Direct LDL reflex; Future    Lipid panel ordered  Patient instructed to eat a healthy low fat diet and to exercise on a regular basis  Overweight (BMI 25 0-29 9)  -     Comprehensive metabolic panel; Future  -     CBC and differential; Future  -     TSH, 3rd generation with Free T4 reflex; Future  -     Lipid Panel with Direct LDL reflex; Future    Patient instructed to eat a healthy low fat diet and to exercise on a regular basis  Urinary frequency  -     Ambulatory Referral to Urology; Future  -     UA (URINE) with reflex to Scope    Patient reports that he still has frequent urination but never followed up with urology  Denies any hematuria, dysuria, or increased urinary urgency  Urinalysis ordered  Patient referred to urology for further evaluation  Psoriasis  -     clobetasol (TEMOVATE) 0 05 % cream; Apply topically 2 (two) times a day  Refilled clobetasol cream for psoriasis  Screening for colon cancer  -     Ambulatory referral for colonoscopy; Future      Lab work ordered  Patient instructed to follow-up in 1 month or sooner prn  Subjective:     Patient ID: Kary Winston is a 48 y o  male  Patient is here for a follow-up for chronic medical conditions  Patient uses clobetasol cream prn for psoriasis and it helps  Patient reports that he needs a refill  Patient reports that he never followed up with urology for frequent urination  Patient reports that he still has frequent urination  Denies any dysuria, hematuria, or increased urinary urgency  Patient follows up with cardiology for hyperlipidemia   Patient reports that he takes crestor 10mg daily  Denies any chest pain, SOB, or palpitations  Review of Systems   Constitutional: Negative for chills and fever  HENT: Negative for congestion, ear pain, sinus pressure and sore throat  Respiratory: Negative for cough, chest tightness, shortness of breath and wheezing  Cardiovascular: Negative for chest pain, palpitations and leg swelling  Gastrointestinal: Negative for abdominal pain, blood in stool, diarrhea, nausea and vomiting  Genitourinary:        As noted in HPI  Skin:        As noted in HPI  Neurological: Negative for dizziness, seizures, light-headedness and headaches  Objective:     Physical Exam  Vitals reviewed  Constitutional:       General: He is not in acute distress  Appearance: He is not ill-appearing or diaphoretic  HENT:      Right Ear: External ear normal       Left Ear: External ear normal       Nose: Nose normal       Mouth/Throat:      Mouth: Mucous membranes are moist       Pharynx: Oropharynx is clear  No posterior oropharyngeal erythema  Eyes:      Conjunctiva/sclera: Conjunctivae normal       Pupils: Pupils are equal, round, and reactive to light  Cardiovascular:      Rate and Rhythm: Normal rate and regular rhythm  Pulses: Normal pulses  Heart sounds: Normal heart sounds  Comments: No edema noted  Pulmonary:      Effort: Pulmonary effort is normal  No respiratory distress  Breath sounds: Normal breath sounds  No wheezing  Musculoskeletal:      Comments: Gait wnl  Neurological:      Mental Status: He is alert and oriented to person, place, and time     Psychiatric:         Mood and Affect: Mood normal

## 2022-07-20 LAB
ALBUMIN SERPL-MCNC: 4.6 G/DL (ref 3.6–5.1)
ALBUMIN/GLOB SERPL: 2.1 (CALC) (ref 1–2.5)
ALP SERPL-CCNC: 51 U/L (ref 36–130)
ALT SERPL-CCNC: 24 U/L (ref 9–46)
AST SERPL-CCNC: 17 U/L (ref 10–40)
BASOPHILS # BLD AUTO: 51 CELLS/UL (ref 0–200)
BASOPHILS NFR BLD AUTO: 0.8 %
BILIRUB SERPL-MCNC: 0.8 MG/DL (ref 0.2–1.2)
BUN SERPL-MCNC: 17 MG/DL (ref 7–25)
BUN/CREAT SERPL: NORMAL (CALC) (ref 6–22)
CALCIUM SERPL-MCNC: 9.4 MG/DL (ref 8.6–10.3)
CHLORIDE SERPL-SCNC: 107 MMOL/L (ref 98–110)
CHOLEST SERPL-MCNC: 180 MG/DL
CHOLEST/HDLC SERPL: 4.4 (CALC)
CO2 SERPL-SCNC: 25 MMOL/L (ref 20–32)
CREAT SERPL-MCNC: 0.87 MG/DL (ref 0.6–1.29)
EOSINOPHIL # BLD AUTO: 173 CELLS/UL (ref 15–500)
EOSINOPHIL NFR BLD AUTO: 2.7 %
ERYTHROCYTE [DISTWIDTH] IN BLOOD BY AUTOMATED COUNT: 12.8 % (ref 11–15)
GFR/BSA.PRED SERPLBLD CYS-BASED-ARV: 106 ML/MIN/1.73M2
GLOBULIN SER CALC-MCNC: 2.2 G/DL (CALC) (ref 1.9–3.7)
GLUCOSE SERPL-MCNC: 89 MG/DL (ref 65–99)
HCT VFR BLD AUTO: 40.7 % (ref 38.5–50)
HDLC SERPL-MCNC: 41 MG/DL
HGB BLD-MCNC: 14 G/DL (ref 13.2–17.1)
LDLC SERPL CALC-MCNC: 107 MG/DL (CALC)
LYMPHOCYTES # BLD AUTO: 2234 CELLS/UL (ref 850–3900)
LYMPHOCYTES NFR BLD AUTO: 34.9 %
MCH RBC QN AUTO: 33.8 PG (ref 27–33)
MCHC RBC AUTO-ENTMCNC: 34.4 G/DL (ref 32–36)
MCV RBC AUTO: 98.3 FL (ref 80–100)
MONOCYTES # BLD AUTO: 352 CELLS/UL (ref 200–950)
MONOCYTES NFR BLD AUTO: 5.5 %
NEUTROPHILS # BLD AUTO: 3590 CELLS/UL (ref 1500–7800)
NEUTROPHILS NFR BLD AUTO: 56.1 %
NONHDLC SERPL-MCNC: 139 MG/DL (CALC)
PLATELET # BLD AUTO: 244 THOUSAND/UL (ref 140–400)
PMV BLD REES-ECKER: 9.8 FL (ref 7.5–12.5)
POTASSIUM SERPL-SCNC: 3.8 MMOL/L (ref 3.5–5.3)
PROT SERPL-MCNC: 6.8 G/DL (ref 6.1–8.1)
RBC # BLD AUTO: 4.14 MILLION/UL (ref 4.2–5.8)
SODIUM SERPL-SCNC: 140 MMOL/L (ref 135–146)
TRIGL SERPL-MCNC: 206 MG/DL
TSH SERPL-ACNC: 1.97 MIU/L (ref 0.4–4.5)
WBC # BLD AUTO: 6.4 THOUSAND/UL (ref 3.8–10.8)

## 2022-07-25 ENCOUNTER — OFFICE VISIT (OUTPATIENT)
Dept: FAMILY MEDICINE CLINIC | Facility: CLINIC | Age: 47
End: 2022-07-25
Payer: COMMERCIAL

## 2022-07-25 VITALS
HEIGHT: 71 IN | HEART RATE: 74 BPM | BODY MASS INDEX: 27.8 KG/M2 | DIASTOLIC BLOOD PRESSURE: 78 MMHG | WEIGHT: 198.6 LBS | RESPIRATION RATE: 16 BRPM | OXYGEN SATURATION: 96 % | SYSTOLIC BLOOD PRESSURE: 116 MMHG

## 2022-07-25 DIAGNOSIS — R79.89 ABNORMAL CBC: ICD-10-CM

## 2022-07-25 DIAGNOSIS — E78.2 MIXED HYPERLIPIDEMIA: Primary | ICD-10-CM

## 2022-07-25 DIAGNOSIS — E66.3 OVERWEIGHT (BMI 25.0-29.9): ICD-10-CM

## 2022-07-25 DIAGNOSIS — R35.0 URINARY FREQUENCY: ICD-10-CM

## 2022-07-25 PROBLEM — R42 LIGHTHEADEDNESS: Status: RESOLVED | Noted: 2019-01-14 | Resolved: 2022-07-25

## 2022-07-25 PROCEDURE — 99214 OFFICE O/P EST MOD 30 MIN: CPT | Performed by: NURSE PRACTITIONER

## 2022-07-25 RX ORDER — ROSUVASTATIN CALCIUM 20 MG/1
20 TABLET, COATED ORAL DAILY
Qty: 90 TABLET | Refills: 1 | Status: SHIPPED | OUTPATIENT
Start: 2022-07-25

## 2022-07-25 NOTE — PROGRESS NOTES
Assessment/Plan:      Diagnoses and all orders for this visit:    Mixed hyperlipidemia  -     rosuvastatin (CRESTOR) 20 MG tablet; Take 1 tablet (20 mg total) by mouth daily  -     Comprehensive metabolic panel; Future  -     Lipid Panel with Direct LDL reflex; Future    Total cholesterol 180, , Triglycerides 206  Increased crestor to 20mg daily  Repeat lipid panel in 3 months  Patient instructed to eat a healthy low fat diet  Overweight (BMI 25 0-29 9)  -     Comprehensive metabolic panel; Future  -     Lipid Panel with Direct LDL reflex; Future    Patient instructed to eat a healthy low fat diet and to exercise on a regular basis  Abnormal CBC  -     CBC and differential; Future    RBC is slightly low  Repeat CBC ordered  Urinary frequency  Patient has a follow-up scheduled with urology  Reviewed lab results with the patient  Patient instructed to follow-up in 3 months or sooner prn  Subjective:     Patient ID: Ce Cochran is a 50 y o  male  Patient is here for a follow-up for chronic medical conditions  Patient reports that he feels well  Patient is here for a follow-up for hyperlipidemia  Patient takes crestor 10mg daily  Patient's BMI is 27 7  Patient reports that he tries to watch his diet  Patient does not exercise outside of work  Patient has a follow-up with urology scheduled for urinary frequency  Denies any dysuria, hematuria, or urinary urgency  Review of Systems   Constitutional: Negative for chills and fever  HENT: Negative for ear pain, sinus pressure and sore throat  Eyes: Negative for pain, discharge and redness  Respiratory: Negative for cough, chest tightness, shortness of breath and wheezing  Cardiovascular: Negative for chest pain, palpitations and leg swelling  Gastrointestinal: Negative for abdominal pain, blood in stool, diarrhea, nausea and vomiting  Genitourinary: Positive for frequency   Negative for dysuria and hematuria  Skin: Negative for rash  Neurological: Negative for dizziness, syncope, light-headedness and headaches  Objective:     Physical Exam  Vitals reviewed  Constitutional:       General: He is not in acute distress  Appearance: He is not ill-appearing or diaphoretic  HENT:      Right Ear: External ear normal       Left Ear: External ear normal       Nose: Nose normal       Mouth/Throat:      Mouth: Mucous membranes are moist       Pharynx: Oropharynx is clear  No posterior oropharyngeal erythema  Eyes:      Conjunctiva/sclera: Conjunctivae normal    Cardiovascular:      Rate and Rhythm: Normal rate and regular rhythm  Pulses: Normal pulses  Heart sounds: Normal heart sounds  Comments: No edema noted  Pulmonary:      Effort: Pulmonary effort is normal  No respiratory distress  Breath sounds: Normal breath sounds  No wheezing  Musculoskeletal:      Comments: Gait wnl  Skin:     Findings: No rash  Neurological:      Mental Status: He is alert and oriented to person, place, and time     Psychiatric:         Mood and Affect: Mood normal

## 2022-08-04 ENCOUNTER — TELEPHONE (OUTPATIENT)
Dept: GASTROENTEROLOGY | Facility: MEDICAL CENTER | Age: 47
End: 2022-08-04

## 2022-08-04 NOTE — TELEPHONE ENCOUNTER
06/29/22  Screened by: Shena Patterson    Referring Provider Prateek Reid    Pre- Screening: Body mass index is 27 7 kg/m²  Has patient been referred for a routine screening Colonoscopy? yes  Is the patient between 39-70 years old? yes      Previous Colonoscopy no   If yes:    Date:     Facility:     Reason:       SCHEDULING STAFF: If the patient is between 45yrs-49yrs, please advise patient to confirm benefits/coverage with their insurance company for a routine screening colonoscopy, some insurance carriers will only cover at [id number] or older  If the patient is over 66years old, please schedule an office visit  Does the patient want to see a Gastroenterologist prior to their procedure OR are they having any GI symptoms? no    Has the patient been hospitalized or had abdominal surgery in the past 6 months? no    Does the patient use supplemental oxygen? no    Does the patient take Coumadin, Lovenox, Plavix, Elliquis, Xarelto, or other blood thinning medication? no    Has the patient had a stroke, cardiac event, or stent placed in the past year? no     PT PASSED OA BEST CONTACT NUMBER [phone]    SCHEDULING STAFF: If patient answers NO to above questions, then schedule procedure  If patient answers YES to above questions, then schedule office appointment  If patient is between 45yrs - 49yrs, please advise patient that we will have to confirm benefits & coverage with their insurance company for a routine screening colonoscopy

## 2022-08-04 NOTE — TELEPHONE ENCOUNTER
Scheduled date of colon (as of today) 10/24/22  Physician performing Dr Shelia Vega:  Location of procedure  [facility]:  Bowel prep reviewed with patient: Miralax/dulcolax  Instructions reviewed with patient by:  Jacqueline Guillaume and mailed to the patients home address  Clearances: na

## 2022-08-30 ENCOUNTER — OFFICE VISIT (OUTPATIENT)
Dept: UROLOGY | Facility: CLINIC | Age: 47
End: 2022-08-30
Payer: COMMERCIAL

## 2022-08-30 VITALS
OXYGEN SATURATION: 99 % | SYSTOLIC BLOOD PRESSURE: 114 MMHG | WEIGHT: 192 LBS | BODY MASS INDEX: 26.88 KG/M2 | HEART RATE: 67 BPM | HEIGHT: 71 IN | DIASTOLIC BLOOD PRESSURE: 66 MMHG

## 2022-08-30 DIAGNOSIS — R35.0 URINARY FREQUENCY: Primary | ICD-10-CM

## 2022-08-30 DIAGNOSIS — Z12.5 SCREENING FOR PROSTATE CANCER: ICD-10-CM

## 2022-08-30 LAB
BACTERIA UR QL AUTO: ABNORMAL /HPF
BILIRUB UR QL STRIP: NEGATIVE
CLARITY UR: CLEAR
COLOR UR: YELLOW
GLUCOSE UR STRIP-MCNC: NEGATIVE MG/DL
HGB UR QL STRIP.AUTO: NEGATIVE
KETONES UR STRIP-MCNC: NEGATIVE MG/DL
LEUKOCYTE ESTERASE UR QL STRIP: NEGATIVE
MUCOUS THREADS UR QL AUTO: ABNORMAL
NITRITE UR QL STRIP: NEGATIVE
NON-SQ EPI CELLS URNS QL MICRO: ABNORMAL /HPF
PH UR STRIP.AUTO: 6.5 [PH]
POST-VOID RESIDUAL VOLUME, ML POC: 15 ML
PROT UR STRIP-MCNC: ABNORMAL MG/DL
RBC #/AREA URNS AUTO: ABNORMAL /HPF
SL AMB  POCT GLUCOSE, UA: NORMAL
SL AMB LEUKOCYTE ESTERASE,UA: NORMAL
SL AMB POCT BILIRUBIN,UA: NORMAL
SL AMB POCT BLOOD,UA: NORMAL
SL AMB POCT CLARITY,UA: CLEAR
SL AMB POCT COLOR,UA: YELLOW
SL AMB POCT KETONES,UA: NORMAL
SL AMB POCT NITRITE,UA: NORMAL
SL AMB POCT PH,UA: 6
SL AMB POCT SPECIFIC GRAVITY,UA: 1.02
SL AMB POCT URINE PROTEIN: NORMAL
SL AMB POCT UROBILINOGEN: 0.2
SP GR UR STRIP.AUTO: 1.02 (ref 1–1.03)
UROBILINOGEN UR STRIP-ACNC: <2 MG/DL
WBC #/AREA URNS AUTO: ABNORMAL /HPF

## 2022-08-30 PROCEDURE — 51798 US URINE CAPACITY MEASURE: CPT | Performed by: PHYSICIAN ASSISTANT

## 2022-08-30 PROCEDURE — 81001 URINALYSIS AUTO W/SCOPE: CPT | Performed by: PHYSICIAN ASSISTANT

## 2022-08-30 PROCEDURE — 99203 OFFICE O/P NEW LOW 30 MIN: CPT | Performed by: PHYSICIAN ASSISTANT

## 2022-08-30 PROCEDURE — 81002 URINALYSIS NONAUTO W/O SCOPE: CPT | Performed by: PHYSICIAN ASSISTANT

## 2022-08-30 RX ORDER — OXYBUTYNIN CHLORIDE 10 MG/1
10 TABLET, EXTENDED RELEASE ORAL
Qty: 30 TABLET | Refills: 3 | Status: SHIPPED | OUTPATIENT
Start: 2022-08-30

## 2022-08-30 NOTE — PROGRESS NOTES
1  Urinary frequency  Ambulatory Referral to Urology    POCT urine dip    POCT Measure PVR    oxybutynin (DITROPAN-XL) 10 MG 24 hr tablet    Urinalysis with microscopic   2  Screening for prostate cancer  PSA, Total Screen       Assessment and plan:       1  Urinary frequency  -trial of oxybutynin  - hold off on alpha blockade as patient's blood pressure is on the lower side as well as had an episode of syncope previous year  -follow-up with PSA prior to visit      Rhianna Santiago PA-C      Chief Complaint     Urinary frequency    History of Present Illness     Kenneth Magallon is a 50 y o  male presenting today for consultation of urinary frequency  Patient endorses quite significant urinary frequency and urgency  Does feel like he has a good stream and that he empties  Denies any dysuria, gross hematuria, perineal pain, orchalgia, flank pain  No prior  surgical manipulation  Did have an episode of syncope after alcohol intake proximally year ago and has been scaling back  He does not feel that coffee exacerbate the symptoms  Creatinine WNL  No abdominal imaging in chart    Medical comorbidities include tobacco use, hyperlipidemia, psoriasis, asthma  Urine dip leukocyte, nitrite, blood negative  PVR 15mL    AUA SYMPTOM SCORE    Flowsheet Row Most Recent Value   AUA SYMPTOM SCORE    How often have you had a sensation of not emptying your bladder completely after you finished urinating? 3   How often have you had to urinate again less than two hours after you finished urinating? 4   How often have you found you stopped and started again several times when you urinate? 2   How often have you found it difficult to postpone urination? 4   How often have you had a weak urinary stream? 2   How often have you had to push or strain to begin urination? 0   How many times did you most typically get up to urinate from the time you went to bed at night until the time you got up in the morning?  3 Quality of Life: If you were to spend the rest of your life with your urinary condition just the way it is now, how would you feel about that? 5   AUA SYMPTOM SCORE 18            Laboratory     Lab Results   Component Value Date    CREATININE 0 87 07/19/2022     Review of Systems     Review of Systems   Constitutional: Negative for activity change, appetite change, chills, diaphoresis, fatigue, fever and unexpected weight change  Respiratory: Negative for chest tightness and shortness of breath  Cardiovascular: Negative for chest pain, palpitations and leg swelling  Gastrointestinal: Negative for abdominal distention, abdominal pain, constipation, diarrhea, nausea and vomiting  Genitourinary: Positive for frequency and urgency  Negative for decreased urine volume, difficulty urinating, dysuria, enuresis, flank pain, genital sores and hematuria  Musculoskeletal: Negative for back pain, gait problem and myalgias  Skin: Negative for color change, pallor, rash and wound  Psychiatric/Behavioral: Negative for behavioral problems  The patient is not nervous/anxious  Allergies     Allergies   Allergen Reactions    Penicillins        Physical Exam     Physical Exam  Constitutional:       General: He is not in acute distress  Appearance: Normal appearance  He is normal weight  He is not ill-appearing, toxic-appearing or diaphoretic  HENT:      Head: Normocephalic and atraumatic  Eyes:      General:         Right eye: No discharge  Left eye: No discharge  Conjunctiva/sclera: Conjunctivae normal    Pulmonary:      Effort: Pulmonary effort is normal  No respiratory distress  Musculoskeletal:         General: No swelling or tenderness  Normal range of motion  Skin:     General: Skin is warm and dry  Coloration: Skin is not jaundiced  Neurological:      General: No focal deficit present  Mental Status: He is alert and oriented to person, place, and time  Psychiatric:         Mood and Affect: Mood normal          Behavior: Behavior normal          Thought Content: Thought content normal            Vital Signs     Vitals:    08/30/22 0954   BP: 114/66   Pulse: 67   SpO2: 99%   Weight: 87 1 kg (192 lb)   Height: 5' 11" (1 803 m)         Current Medications       Current Outpatient Medications:     albuterol (PROVENTIL HFA,VENTOLIN HFA) 90 mcg/act inhaler, TAKE 2 PUFFS BY MOUTH EVERY 6 HOURS AS NEEDED FOR WHEEZE, Disp: 8 5 Inhaler, Rfl: 0    aspirin 81 mg chewable tablet, Chew 81 mg daily, Disp: , Rfl:     clobetasol (TEMOVATE) 0 05 % cream, Apply topically 2 (two) times a day, Disp: 15 g, Rfl: 0    Multiple Vitamins-Minerals (MULTIVITAMIN WITH MINERALS) tablet, Take 1 tablet by mouth daily, Disp: , Rfl:     oxybutynin (DITROPAN-XL) 10 MG 24 hr tablet, Take 1 tablet (10 mg total) by mouth daily at bedtime, Disp: 30 tablet, Rfl: 3    rosuvastatin (CRESTOR) 20 MG tablet, Take 1 tablet (20 mg total) by mouth daily, Disp: 90 tablet, Rfl: 1      Active Problems     Patient Active Problem List   Diagnosis    Erectile dysfunction    Hyperlipidemia    Nicotine dependence    Psoriasis    Urinary frequency    Annual physical exam    Overweight (BMI 25 0-29  9)    Elevated fasting blood sugar    Tobacco abuse    Neck pain    Screening for colon cancer    Abnormal CBC         Past Medical History     Past Medical History:   Diagnosis Date    Asthma     Asthma 09/04/2012    Hyperlipidemia     Laceration of right middle finger without foreign body with damage to nail 04/01/2019    Lightheadedness 01/14/2019    Open displaced fracture of distal phalanx of right middle finger 04/01/2019    Urinary frequency          Surgical History     Past Surgical History:   Procedure Laterality Date    HERNIA REPAIR  1994         Family History     Family History   Problem Relation Age of Onset    No Known Problems Father     Mental illness Mother     No Known Problems Maternal Grandmother     No Known Problems Brother          Social History     Social History       Radiology

## 2022-10-05 ENCOUNTER — OFFICE VISIT (OUTPATIENT)
Dept: URGENT CARE | Age: 47
End: 2022-10-05
Payer: COMMERCIAL

## 2022-10-05 VITALS — RESPIRATION RATE: 18 BRPM | TEMPERATURE: 99 F | HEART RATE: 73 BPM | OXYGEN SATURATION: 95 %

## 2022-10-05 DIAGNOSIS — J20.9 ACUTE BRONCHITIS, UNSPECIFIED ORGANISM: Primary | ICD-10-CM

## 2022-10-05 PROCEDURE — G0382 LEV 3 HOSP TYPE B ED VISIT: HCPCS

## 2022-10-05 RX ORDER — AZITHROMYCIN 250 MG/1
TABLET, FILM COATED ORAL
Qty: 6 TABLET | Refills: 0 | Status: SHIPPED | OUTPATIENT
Start: 2022-10-05 | End: 2022-10-05

## 2022-10-05 RX ORDER — AZITHROMYCIN 250 MG/1
TABLET, FILM COATED ORAL
Qty: 6 TABLET | Refills: 0 | Status: SHIPPED | OUTPATIENT
Start: 2022-10-05 | End: 2022-10-09

## 2022-10-05 RX ORDER — METHYLPREDNISOLONE 4 MG/1
TABLET ORAL
Qty: 21 TABLET | Refills: 0 | Status: SHIPPED | OUTPATIENT
Start: 2022-10-05 | End: 2022-10-11

## 2022-10-05 NOTE — PROGRESS NOTES
[facility]        NAME: Josh Lundy is a 50 y o  male  : 1975    MRN: [id number]  DATE: 2022  TIME: 6:20 PM    Assessment and Plan   Acute bronchitis, unspecified organism [J20 9]  1  Acute bronchitis, unspecified organism  methylPREDNISolone 4 MG tablet therapy pack    azithromycin (ZITHROMAX) 250 mg tablet    DISCONTINUED: azithromycin (ZITHROMAX) 250 mg tablet    DISCONTINUED: azithromycin (ZITHROMAX) 250 mg tablet         Patient Instructions     Steroid taper and antibiotics as discussed  Follow up with PCP in 3-5 days  Proceed to  ER if symptoms worsen  Chief Complaint     Chief Complaint   Patient presents with    Cold Like Symptoms         History of Present Illness       Patient presenting for evaluation of cough and congestion that has worsened over the past week  [facility] and flu test   He states that his symptoms have persisted even though he has been taking over-the-counter cold and cough medication  He states that his COVID is productive of yellow sputum, and at times he has yellow nasal discharge  He denies any chest pain, shortness a breath, fevers or chills  Review of Systems   Review of Systems   Constitutional: Negative for chills and fever  HENT: Positive for congestion and sinus pressure  Negative for ear pain and sore throat  Eyes: Negative for pain and visual disturbance  Respiratory: Positive for cough  Negative for shortness of breath  Cardiovascular: Negative for chest pain and palpitations  Gastrointestinal: Negative for abdominal pain and vomiting  Genitourinary: Negative for dysuria and hematuria  Musculoskeletal: Negative for arthralgias and back pain  Skin: Negative for color change and rash  Neurological: Negative for seizures and syncope  All other systems reviewed and are negative          Current Medications       Current Outpatient Medications:    azithromycin (ZITHROMAX) 250 mg tablet, Take 2 tablets today then 1 tablet daily x 4 days, Disp: 6 tablet, Rfl: 0    methylPREDNISolone 4 MG tablet therapy pack, Take 6 tablets (24 mg total) by mouth daily for 1 day, THEN 5 tablets (20 mg total) daily for 1 day, THEN 4 tablets (16 mg total) daily for 1 day, THEN 3 tablets (12 mg total) daily for 1 day, THEN 2 tablets (8 mg total) daily for 1 day, THEN 1 tablet (4 mg total) daily for 1 day  Use as directed on package  , Disp: 21 tablet, Rfl: 0    albuterol (PROVENTIL HFA,VENTOLIN HFA) 90 mcg/act inhaler, TAKE 2 PUFFS BY MOUTH EVERY 6 HOURS AS NEEDED FOR WHEEZE, Disp: 8 5 Inhaler, Rfl: 0    aspirin 81 mg chewable tablet, Chew 81 mg daily, Disp: , Rfl:     clobetasol (TEMOVATE) 0 05 % cream, Apply topically 2 (two) times a day, Disp: 15 g, Rfl: 0    Multiple Vitamins-Minerals (MULTIVITAMIN WITH MINERALS) tablet, Take 1 tablet by mouth daily, Disp: , Rfl:     oxybutynin (DITROPAN-XL) 10 MG 24 hr tablet, Take 1 tablet (10 mg total) by mouth daily at bedtime, Disp: 30 tablet, Rfl: 3    rosuvastatin (CRESTOR) 20 MG tablet, Take 1 tablet (20 mg total) by mouth daily, Disp: 90 tablet, Rfl: 1    Current Allergies     Allergies as of 10/05/2022 - Reviewed 10/05/2022   Allergen Reaction Noted    Penicillins  09/19/2016            The following portions of the patient's history were reviewed and updated as appropriate: allergies, current medications, past family history, past medical history, past social history, past surgical history and problem list      Past Medical History:   Diagnosis Date    Asthma     Asthma 09/04/2012    Hyperlipidemia     Laceration of right middle finger without foreign body with damage to nail 04/01/2019    Lightheadedness 01/14/2019    Open displaced fracture of distal phalanx of right middle finger 04/01/2019    Urinary frequency        Past Surgical History:   Procedure Laterality Date   [address]       Family History Problem Relation Age of Onset    No Known Problems Father     Mental illness Mother     No Known Problems Maternal Grandmother     No Known Problems Brother          Medications have been verified  Objective   Pulse 73   Temp 99 °F (37 2 °C)   Resp 18   SpO2 95%        Physical Exam     Physical Exam  Vitals and nursing note reviewed  Constitutional:       General: He is not in acute distress  Appearance: Normal appearance  He is normal weight  He is ill-appearing  He is not toxic-appearing or diaphoretic  HENT:      Head: Normocephalic and atraumatic  Right Ear: Tympanic membrane normal       Left Ear: Tympanic membrane normal       Nose: Nose normal  No congestion or rhinorrhea  Mouth/Throat:      Mouth: Mucous membranes are moist       Pharynx: Oropharynx is clear  No oropharyngeal exudate or posterior oropharyngeal erythema  Eyes:      General:         Right eye: No discharge  Left eye: No discharge  Extraocular Movements: Extraocular movements intact  Conjunctiva/sclera: Conjunctivae normal       Pupils: Pupils are equal, round, and reactive to light  Cardiovascular:      Rate and Rhythm: Normal rate and regular rhythm  Pulses: Normal pulses  Heart sounds: Normal heart sounds  No murmur heard  No friction rub  No gallop  Pulmonary:      Effort: Pulmonary effort is normal  No respiratory distress  Breath sounds: No stridor  Rhonchi (Bilateral upper lobes) present  No wheezing or rales  Chest:      Chest wall: No tenderness  Abdominal:      General: Abdomen is flat  Bowel sounds are normal       Palpations: Abdomen is soft  Tenderness: There is no abdominal tenderness  There is no guarding or rebound  Musculoskeletal:         General: No tenderness  Normal range of motion  Cervical back: Normal range of motion and neck supple  Skin:     General: Skin is warm and dry        Capillary Refill: Capillary refill takes less than 2 seconds  Neurological:      General: No focal deficit present  Mental Status: He is alert and oriented to person, place, and time     Psychiatric:         Mood and Affect: Mood normal          Behavior: Behavior normal

## 2022-10-11 PROBLEM — Z12.11 SCREENING FOR COLON CANCER: Status: RESOLVED | Noted: 2022-06-20 | Resolved: 2022-10-11

## 2022-10-24 ENCOUNTER — HOSPITAL ENCOUNTER (OUTPATIENT)
Dept: GASTROENTEROLOGY | Facility: AMBULARY SURGERY CENTER | Age: 47
Setting detail: OUTPATIENT SURGERY
Discharge: HOME | End: 2022-10-24
Attending: INTERNAL MEDICINE
Payer: COMMERCIAL

## 2022-10-24 ENCOUNTER — ANESTHESIA EVENT (OUTPATIENT)
Dept: GASTROENTEROLOGY | Facility: AMBULARY SURGERY CENTER | Age: 47
End: 2022-10-24

## 2022-10-24 ENCOUNTER — ANESTHESIA (OUTPATIENT)
Dept: GASTROENTEROLOGY | Facility: AMBULARY SURGERY CENTER | Age: 47
End: 2022-10-24

## 2022-10-24 VITALS
RESPIRATION RATE: 18 BRPM | SYSTOLIC BLOOD PRESSURE: 137 MMHG | TEMPERATURE: 97 F | BODY MASS INDEX: 25.9 KG/M2 | HEIGHT: 71 IN | WEIGHT: 185 LBS | HEART RATE: 55 BPM | OXYGEN SATURATION: 97 % | DIASTOLIC BLOOD PRESSURE: 86 MMHG

## 2022-10-24 DIAGNOSIS — Z12.11 SCREENING FOR COLON CANCER: ICD-10-CM

## 2022-10-24 RX ORDER — LIDOCAINE HYDROCHLORIDE 10 MG/ML
INJECTION, SOLUTION EPIDURAL; INFILTRATION; INTRACAUDAL; PERINEURAL AS NEEDED
Status: DISCONTINUED | OUTPATIENT
Start: 2022-10-24 | End: 2022-10-24

## 2022-10-24 RX ORDER — SODIUM CHLORIDE, SODIUM LACTATE, POTASSIUM CHLORIDE, CALCIUM CHLORIDE 600; 310; 30; 20 MG/100ML; MG/100ML; MG/100ML; MG/100ML
INJECTION, SOLUTION INTRAVENOUS CONTINUOUS PRN
Status: DISCONTINUED | OUTPATIENT
Start: 2022-10-24 | End: 2022-10-24

## 2022-10-24 RX ORDER — PROPOFOL 10 MG/ML
INJECTION, EMULSION INTRAVENOUS AS NEEDED
Status: DISCONTINUED | OUTPATIENT
Start: 2022-10-24 | End: 2022-10-24

## 2022-10-24 RX ORDER — SIMETHICONE 20 MG/.3ML
EMULSION ORAL CODE/TRAUMA/SEDATION MEDICATION
Status: COMPLETED | OUTPATIENT
Start: 2022-10-24 | End: 2022-10-24

## 2022-10-24 RX ADMIN — PROPOFOL 20 MG: 10 INJECTION, EMULSION INTRAVENOUS at 08:45

## 2022-10-24 RX ADMIN — SODIUM CHLORIDE, SODIUM LACTATE, POTASSIUM CHLORIDE, AND CALCIUM CHLORIDE: .6; .31; .03; .02 INJECTION, SOLUTION INTRAVENOUS at 08:30

## 2022-10-24 RX ADMIN — PROPOFOL 20 MG: 10 INJECTION, EMULSION INTRAVENOUS at 08:39

## 2022-10-24 RX ADMIN — PROPOFOL 10 MG: 10 INJECTION, EMULSION INTRAVENOUS at 08:41

## 2022-10-24 RX ADMIN — PROPOFOL 100 MG: 10 INJECTION, EMULSION INTRAVENOUS at 08:34

## 2022-10-24 RX ADMIN — Medication 40 MG: at 08:41

## 2022-10-24 RX ADMIN — PROPOFOL 30 MG: 10 INJECTION, EMULSION INTRAVENOUS at 08:43

## 2022-10-24 RX ADMIN — LIDOCAINE HYDROCHLORIDE 100 MG: 10 INJECTION, SOLUTION EPIDURAL; INFILTRATION; INTRACAUDAL; PERINEURAL at 08:34

## 2022-10-24 RX ADMIN — PROPOFOL 20 MG: 10 INJECTION, EMULSION INTRAVENOUS at 08:47

## 2022-10-24 RX ADMIN — PROPOFOL 50 MG: 10 INJECTION, EMULSION INTRAVENOUS at 08:37

## 2022-10-24 NOTE — ANESTHESIA POSTPROCEDURE EVALUATION
Post-Op Assessment Note    CV Status:  Stable  Pain Score: 0    Pain management: adequate     Mental Status:  Sleepy   Hydration Status:  Euvolemic   PONV Controlled:  Controlled   Airway Patency:  Patent and adequate      Post Op Vitals Reviewed: Yes      Staff: CRNA         No complications documented      BP   100/63   Temp     Pulse  60   Resp 20   SpO2   99

## 2022-10-24 NOTE — ANESTHESIA PREPROCEDURE EVALUATION
Procedure:  COLONOSCOPY    Relevant Problems   CARDIO   (+) Hyperlipidemia      PULMONARY   (+) Asthma        Physical Exam    Airway    Mallampati score: II  TM Distance: >3 FB  Neck ROM: full     Dental   No notable dental hx     Cardiovascular      Pulmonary      Other Findings        Anesthesia Plan  ASA Score- 2     Anesthesia Type- IV sedation with anesthesia with ASA Monitors  Additional Monitors:   Airway Plan:           Plan Factors-Exercise tolerance (METS): >4 METS  Chart reviewed  Patient summary reviewed  Patient is not a current smoker  Induction- intravenous  Postoperative Plan-     Informed Consent- Anesthetic plan and risks discussed with patient  I personally reviewed this patient with the CRNA  Discussed and agreed on the Anesthesia Plan with the CRNA  Ismael Encarnacion

## 2022-10-24 NOTE — H&P
History and Physical -  [facility]  Fozia Arnold 50 y o  male MRN: [id number]    HPI: Fozia Arnold is a 50y o  year old male who presents for colon cancer screening  Review of Systems    Historical Information   Past Medical History:   Diagnosis Date   • Asthma    • Asthma 09/04/2012   • Hyperlipidemia    • Laceration of right middle finger without foreign body with damage to nail 04/01/2019   • Lightheadedness 01/14/2019   • Open displaced fracture of distal phalanx of right middle finger 04/01/2019   • Urinary frequency      Past Surgical History:   Procedure Laterality Date   • HERNIA REPAIR  1994     Social History   Social History     Substance and Sexual Activity   Alcohol Use Yes    Comment: 2 beers a day     Social History     Substance and Sexual Activity   Drug Use Yes   • Types: Marijuana    Comment: Occ       Social History     Tobacco Use   Smoking Status Current Every Day Smoker   • Types: Cigarettes   Smokeless Tobacco Never Used     Family History   Problem Relation Age of Onset   • No Known Problems Father    • Mental illness Mother    • No Known Problems Maternal Grandmother    • No Known Problems Brother        Meds/Allergies     (Not in a hospital admission)      Allergies   Allergen Reactions   • Penicillins        Objective     /64   Pulse 60   Temp (!) 97 °F (36 1 °C) (Temporal)   Resp 18   Ht 5' 11" (1 803 m)   Wt 83 9 kg (185 lb)   SpO2 99%   BMI 25 80 kg/m²       PHYSICAL EXAM    Gen: NAD  CV: RRR  CHEST: Clear  ABD: soft, NT/ND  EXT: no edema  Neuro: AAO      ASSESSMENT/PLAN:  This is a 50y o  year old male here for colon cancer screening    PLAN:   Procedure:  Colonoscopy with biopsy and possible polypectomy

## 2022-11-21 ENCOUNTER — OFFICE VISIT (OUTPATIENT)
Dept: FAMILY MEDICINE CLINIC | Facility: CLINIC | Age: 47
End: 2022-11-21

## 2022-11-21 VITALS
WEIGHT: 197.6 LBS | BODY MASS INDEX: 27.56 KG/M2 | RESPIRATION RATE: 16 BRPM | SYSTOLIC BLOOD PRESSURE: 122 MMHG | HEART RATE: 72 BPM | DIASTOLIC BLOOD PRESSURE: 74 MMHG | OXYGEN SATURATION: 98 %

## 2022-11-21 DIAGNOSIS — Z23 FLU VACCINE NEED: ICD-10-CM

## 2022-11-21 DIAGNOSIS — E78.2 MIXED HYPERLIPIDEMIA: Primary | ICD-10-CM

## 2022-11-21 DIAGNOSIS — J45.20 MILD INTERMITTENT ASTHMA WITHOUT COMPLICATION: ICD-10-CM

## 2022-11-21 DIAGNOSIS — R35.0 URINARY FREQUENCY: ICD-10-CM

## 2022-11-22 NOTE — PROGRESS NOTES
Name: Troy Seo      : 1975      MRN: [id number]  Encounter Provider: TAI Franklin  Encounter Date: 2022   Encounter department: [facility]     1  Mixed hyperlipidemia  Patient takes crestor 20mg daily  Patient instructed to get his lab work done  Will follow-up results with the patient  2  Urinary frequency  Continue to follow-up with urology  3  Mild intermittent asthma without complication  Patient reports that he rarely uses his rescue inhaler  Denies any wheezing, cough, or SOB  Continue rescue inhaler prn      4  Flu vaccine need  -     influenza vaccine, quadrivalent, recombinant, PF, 0 5 mL, for patients 18 yr+ (FLUBLOK)    Patient instructed to get his lab work done  Patient instructed to follow-up in 3 months or sooner prn  BMI Counseling: Body mass index is 27 56 kg/m²  The BMI is above normal  Nutrition recommendations include encouraging healthy choices of fruits and vegetables, decreasing fast food intake, consuming healthier snacks, reducing intake of saturated and trans fat and reducing intake of cholesterol  Exercise recommendations include exercising 3-5 times per week  Rationale for BMI follow-up plan is due to patient being overweight or obese  Tobacco Cessation Counseling: Tobacco cessation counseling was provided  The patient is sincerely urged to quit consumption of tobacco  He is not ready to quit tobacco          Subjective      Patient is here for a follow-up for chronic medical conditions  Patient did not get his lab work done yet  Patient follows up with urology for urinary frequency  Patient takes oxybutynin and it helps  Patient takes crestor for hyperlipidemia  Denies any chest pain, SOB, or palpitations  Patient uses albuterol prn for asthma  Patient reports that he rarely uses it  Denies any wheezing or cough       Review of Systems   Constitutional: Negative for chills and fever    HENT: Negative for congestion, ear pain, sinus pressure and sore throat  Eyes: Negative for pain, discharge and redness  Respiratory: Negative for cough, chest tightness, shortness of breath and wheezing  Cardiovascular: Negative for chest pain, palpitations and leg swelling  Gastrointestinal: Negative for abdominal pain, blood in stool, diarrhea, nausea and vomiting  Genitourinary: Negative for dysuria and hematuria  Skin: Negative for rash  Neurological: Negative for seizures, syncope, light-headedness and headaches  Psychiatric/Behavioral: Negative for suicidal ideas  Denies any depression  Current Outpatient Medications on File Prior to Visit   Medication Sig   • albuterol (PROVENTIL HFA,VENTOLIN HFA) 90 mcg/act inhaler TAKE 2 PUFFS BY MOUTH EVERY 6 HOURS AS NEEDED FOR WHEEZE   • aspirin 81 mg chewable tablet Chew 81 mg daily   • clobetasol (TEMOVATE) 0 05 % cream Apply topically 2 (two) times a day   • Multiple Vitamins-Minerals (MULTIVITAMIN WITH MINERALS) tablet Take 1 tablet by mouth daily   • oxybutynin (DITROPAN-XL) 10 MG 24 hr tablet Take 1 tablet (10 mg total) by mouth daily at bedtime   • rosuvastatin (CRESTOR) 20 MG tablet Take 1 tablet (20 mg total) by mouth daily       Objective     /74   Pulse 72   Resp 16   Wt 89 6 kg (197 lb 9 6 oz)   SpO2 98%   BMI 27 56 kg/m²     Physical Exam  Vitals reviewed  Constitutional:       General: He is not in acute distress  Appearance: He is not ill-appearing or diaphoretic  HENT:      Right Ear: External ear normal       Left Ear: External ear normal       Nose: Nose normal       Mouth/Throat:      Mouth: Mucous membranes are moist       Pharynx: Oropharynx is clear  No posterior oropharyngeal erythema  Eyes:      Conjunctiva/sclera: Conjunctivae normal       Pupils: Pupils are equal, round, and reactive to light  Cardiovascular:      Rate and Rhythm: Normal rate and regular rhythm        Pulses: Normal pulses  Heart sounds: Normal heart sounds  Comments: No edema noted  Pulmonary:      Effort: Pulmonary effort is normal  No respiratory distress  Breath sounds: Normal breath sounds  No wheezing  Musculoskeletal:      Comments: Gait wnl  Skin:     Findings: No rash  Neurological:      Mental Status: He is alert and oriented to person, place, and time     Psychiatric:         Mood and Affect: Mood normal        TAI Bolanos

## 2022-12-26 DIAGNOSIS — R35.0 URINARY FREQUENCY: ICD-10-CM

## 2022-12-28 RX ORDER — OXYBUTYNIN CHLORIDE 10 MG/1
TABLET, EXTENDED RELEASE ORAL
Qty: 30 TABLET | Refills: 3 | Status: SHIPPED | OUTPATIENT
Start: 2022-12-28

## 2023-01-04 ENCOUNTER — TELEPHONE (OUTPATIENT)
Dept: UROLOGY | Facility: CLINIC | Age: 48
End: 2023-01-04

## 2023-01-22 DIAGNOSIS — E78.2 MIXED HYPERLIPIDEMIA: ICD-10-CM

## 2023-01-23 RX ORDER — ROSUVASTATIN CALCIUM 20 MG/1
TABLET, COATED ORAL
Qty: 90 TABLET | Refills: 1 | Status: SHIPPED | OUTPATIENT
Start: 2023-01-23

## 2023-07-27 DIAGNOSIS — E78.2 MIXED HYPERLIPIDEMIA: ICD-10-CM

## 2023-07-27 RX ORDER — ROSUVASTATIN CALCIUM 20 MG/1
TABLET, COATED ORAL
Qty: 90 TABLET | Refills: 1 | OUTPATIENT
Start: 2023-07-27

## 2024-09-01 ENCOUNTER — HOSPITAL ENCOUNTER (EMERGENCY)
Facility: HOSPITAL | Age: 49
Discharge: HOME | End: 2024-09-01
Attending: EMERGENCY MEDICINE | Admitting: EMERGENCY MEDICINE
Payer: COMMERCIAL

## 2024-09-01 ENCOUNTER — APPOINTMENT (EMERGENCY)
Dept: RADIOLOGY | Facility: HOSPITAL | Age: 49
End: 2024-09-01
Payer: COMMERCIAL

## 2024-09-01 VITALS
RESPIRATION RATE: 17 BRPM | OXYGEN SATURATION: 93 % | HEART RATE: 82 BPM | TEMPERATURE: 98.1 F | DIASTOLIC BLOOD PRESSURE: 61 MMHG | SYSTOLIC BLOOD PRESSURE: 111 MMHG

## 2024-09-01 DIAGNOSIS — Z65.8 PSYCHOSOCIAL STRESSORS: ICD-10-CM

## 2024-09-01 DIAGNOSIS — R07.9 CHEST PAIN, UNSPECIFIED TYPE: Primary | ICD-10-CM

## 2024-09-01 LAB
ALBUMIN SERPL BCG-MCNC: 4.6 G/DL (ref 3.5–5)
ALP SERPL-CCNC: 53 U/L (ref 34–104)
ALT SERPL W P-5'-P-CCNC: 18 U/L (ref 7–52)
ANION GAP SERPL CALCULATED.3IONS-SCNC: 13 MMOL/L (ref 4–13)
AST SERPL W P-5'-P-CCNC: 15 U/L (ref 13–39)
BASOPHILS # BLD AUTO: 0.07 THOUSANDS/ÂΜL (ref 0–0.1)
BASOPHILS NFR BLD AUTO: 1 % (ref 0–1)
BILIRUB SERPL-MCNC: 0.32 MG/DL (ref 0.2–1)
BUN SERPL-MCNC: 13 MG/DL (ref 5–25)
CALCIUM SERPL-MCNC: 9.5 MG/DL (ref 8.4–10.2)
CARDIAC TROPONIN I PNL SERPL HS: <2 NG/L
CARDIAC TROPONIN I PNL SERPL HS: <2 NG/L
CHLORIDE SERPL-SCNC: 110 MMOL/L (ref 96–108)
CO2 SERPL-SCNC: 20 MMOL/L (ref 21–32)
CREAT SERPL-MCNC: 0.84 MG/DL (ref 0.6–1.3)
EOSINOPHIL # BLD AUTO: 0.35 THOUSAND/ÂΜL (ref 0–0.61)
EOSINOPHIL NFR BLD AUTO: 4 % (ref 0–6)
ERYTHROCYTE [DISTWIDTH] IN BLOOD BY AUTOMATED COUNT: 13 % (ref 11.6–15.1)
GFR SERPL CREATININE-BSD FRML MDRD: 101 ML/MIN/1.73SQ M
GLUCOSE SERPL-MCNC: 109 MG/DL (ref 65–140)
HCT VFR BLD AUTO: 42.3 % (ref 36.5–49.3)
HGB BLD-MCNC: 14.6 G/DL (ref 12–17)
IMM GRANULOCYTES # BLD AUTO: 0.01 THOUSAND/UL (ref 0–0.2)
IMM GRANULOCYTES NFR BLD AUTO: 0 % (ref 0–2)
LYMPHOCYTES # BLD AUTO: 3.61 THOUSANDS/ÂΜL (ref 0.6–4.47)
LYMPHOCYTES NFR BLD AUTO: 42 % (ref 14–44)
MCH RBC QN AUTO: 33.9 PG (ref 26.8–34.3)
MCHC RBC AUTO-ENTMCNC: 34.5 G/DL (ref 31.4–37.4)
MCV RBC AUTO: 98 FL (ref 82–98)
MONOCYTES # BLD AUTO: 0.43 THOUSAND/ÂΜL (ref 0.17–1.22)
MONOCYTES NFR BLD AUTO: 5 % (ref 4–12)
NEUTROPHILS # BLD AUTO: 4.1 THOUSANDS/ÂΜL (ref 1.85–7.62)
NEUTS SEG NFR BLD AUTO: 48 % (ref 43–75)
NRBC BLD AUTO-RTO: 0 /100 WBCS
PLATELET # BLD AUTO: 255 THOUSANDS/UL (ref 149–390)
PMV BLD AUTO: 9.8 FL (ref 8.9–12.7)
POTASSIUM SERPL-SCNC: 3.6 MMOL/L (ref 3.5–5.3)
PROT SERPL-MCNC: 7.1 G/DL (ref 6.4–8.4)
RBC # BLD AUTO: 4.31 MILLION/UL (ref 3.88–5.62)
SODIUM SERPL-SCNC: 143 MMOL/L (ref 135–147)
WBC # BLD AUTO: 8.57 THOUSAND/UL (ref 4.31–10.16)

## 2024-09-01 PROCEDURE — 99285 EMERGENCY DEPT VISIT HI MDM: CPT | Performed by: EMERGENCY MEDICINE

## 2024-09-01 PROCEDURE — 80053 COMPREHEN METABOLIC PANEL: CPT | Performed by: EMERGENCY MEDICINE

## 2024-09-01 PROCEDURE — 71045 X-RAY EXAM CHEST 1 VIEW: CPT

## 2024-09-01 PROCEDURE — 84484 ASSAY OF TROPONIN QUANT: CPT | Performed by: EMERGENCY MEDICINE

## 2024-09-01 PROCEDURE — 93005 ELECTROCARDIOGRAM TRACING: CPT

## 2024-09-01 PROCEDURE — 85025 COMPLETE CBC W/AUTO DIFF WBC: CPT | Performed by: EMERGENCY MEDICINE

## 2024-09-01 PROCEDURE — 36415 COLL VENOUS BLD VENIPUNCTURE: CPT | Performed by: EMERGENCY MEDICINE

## 2024-09-01 PROCEDURE — 99285 EMERGENCY DEPT VISIT HI MDM: CPT

## 2024-09-02 NOTE — ED NOTES
"Patient had an episode of \"blank staring\" and was not responding to RN. Pt non responding to sternal rub. Doctor beside. Patient waking up on doctors arrival and answering questions appropriately.      Lisa Lagunas RN  09/01/24 [id number]    "

## 2024-09-02 NOTE — ED PROVIDER NOTES
"History  Chief Complaint   Patient presents with    Chest Pain     Pt states he was at a party drinking and had sudden CP, ems given 2 nitro and 324 aspirin. Pain improved. Ems also reports pt had periods of \"staring off\" and was unresponsive answering questions for them. No cardiac hx       Chest Pain  Associated symptoms: no abdominal pain, no back pain, no cough, no fever, no palpitations, no shortness of breath and not vomiting        49-year-old male, distant past medical history polysubstance use disorder, presenting to the emergency department after an episode of chest pain that began at a party after having drank alcohol.  Patient has had similar episodes in the past.  Denies any shortness of breath, lower extremity edema, calf or popliteal tenderness/pain, history of DVT PE, recent chest pain with exertion, dyspnea on exertion.    Subsequently for EMS and on scene patient has suffered from staring episodes where here in the emergency department and prehospital his vital signs remained stable, he appears as though he is resting comfortably, but just stares forward.  Patient states that prior to these events he feels as though someone is saying to him \"it is not your time\" and then subsequently thereafter he feels sore mounting psychosocial stressors which includes recent death in the family and also the death of his boss which had recently occurred.  These items were both confirmed by family as well.  Patient denies SI, HI, audiovisual hallucinations otherwise, current drug or alcohol issues.  Family notes patient with rare social drinker only.  Denies chest pain at this time.  Prior to Admission Medications   Prescriptions Last Dose Informant Patient Reported? Taking?   Multiple Vitamins-Minerals (MULTIVITAMIN WITH MINERALS) tablet  Self Yes No   Sig: Take 1 tablet by mouth daily   albuterol (PROVENTIL HFA,VENTOLIN HFA) 90 mcg/act inhaler  Self No No   Sig: TAKE 2 PUFFS BY MOUTH EVERY 6 HOURS AS NEEDED FOR " WHEEZE   aspirin 81 mg chewable tablet  Self Yes No   Sig: Chew 81 mg daily   clobetasol (TEMOVATE) 0.05 % cream  Self No No   Sig: Apply topically 2 (two) times a day   oxybutynin (DITROPAN-XL) 10 MG 24 hr tablet   No No   Sig: TAKE 1 TABLET BY MOUTH DAILY AT BEDTIME   rosuvastatin (CRESTOR) 20 MG tablet   No No   Sig: TAKE 1 TABLET BY MOUTH EVERY DAY      Facility-Administered Medications: None       Past Medical History:   Diagnosis Date    Asthma     Asthma 09/04/2012    Hyperlipidemia     Laceration of right middle finger without foreign body with damage to nail 04/01/2019    Lightheadedness 01/14/2019    Open displaced fracture of distal phalanx of right middle finger 04/01/2019    Urinary frequency        Past Surgical History:   Procedure Laterality Date    HERNIA REPAIR  1994       Family History   Problem Relation Age of Onset    No Known Problems Father     Mental illness Mother     No Known Problems Maternal Grandmother     No Known Problems Brother      I have reviewed and agree with the history as documented.    E-Cigarette/Vaping    E-Cigarette Use Never User      E-Cigarette/Vaping Substances    Nicotine No     THC No     Flavoring No     Other No     Unknown No      Social History     Tobacco Use    Smoking status: Every Day     Types: Cigarettes    Smokeless tobacco: Never   Vaping Use    Vaping status: Never Used   Substance Use Topics    Alcohol use: Yes     Comment: 2 beers a day    Drug use: Yes     Types: Marijuana     Comment: Occ.       Review of Systems   Constitutional:  Negative for chills and fever.   HENT:  Negative for ear pain and sore throat.    Eyes:  Negative for pain and visual disturbance.   Respiratory:  Negative for cough and shortness of breath.    Cardiovascular:  Positive for chest pain. Negative for palpitations.   Gastrointestinal:  Negative for abdominal pain and vomiting.   Genitourinary:  Negative for dysuria and hematuria.   Musculoskeletal:  Negative for arthralgias  and back pain.   Skin:  Negative for color change and rash.   Neurological:  Negative for seizures and syncope.   All other systems reviewed and are negative.      Physical Exam  Physical Exam  Vitals and nursing note reviewed.   Constitutional:       General: He is not in acute distress.     Appearance: He is well-developed.   HENT:      Head: Normocephalic and atraumatic.   Eyes:      Conjunctiva/sclera: Conjunctivae normal.   Cardiovascular:      Rate and Rhythm: Normal rate and regular rhythm.      Heart sounds: No murmur heard.  Pulmonary:      Effort: Pulmonary effort is normal. No respiratory distress.      Breath sounds: Normal breath sounds.   Abdominal:      Palpations: Abdomen is soft.      Tenderness: There is no abdominal tenderness.   Musculoskeletal:         General: No swelling.      Cervical back: Neck supple.   Skin:     General: Skin is warm and dry.      Capillary Refill: Capillary refill takes less than 2 seconds.   Neurological:      Mental Status: He is alert.   Psychiatric:         Mood and Affect: Mood normal.         Vital Signs  ED Triage Vitals   Temperature Pulse Respirations Blood Pressure SpO2   09/01/24 2104 09/01/24 2041 09/01/24 2041 09/01/24 2041 09/01/24 2041   98.1 °F (36.7 °C) 86 18 123/64 95 %      Temp Source Heart Rate Source Patient Position - Orthostatic VS BP Location FiO2 (%)   09/01/24 2104 09/01/24 2041 09/01/24 2041 09/01/24 2041 --   Oral Monitor Lying Right arm       Pain Score       09/01/24 2041       10 - Worst Possible Pain           Vitals:    09/01/24 2041 09/01/24 2115 09/01/24 2130   BP: 123/64 117/63 111/61   Pulse: 86 82 82   Patient Position - Orthostatic VS: Lying Lying Lying         Visual Acuity      ED Medications  Medications - No data to display    Diagnostic Studies  Results Reviewed       Procedure Component Value Units Date/Time    HS Troponin I 2hr [[id number]] Collected: 09/01/24 2225    Lab Status: Final result Specimen: Blood from Arm, Right  Updated: 09/01/24 2258     hs TnI 2hr <2 ng/L      Delta 2hr hsTnI --    HS Troponin 0hr (reflex protocol) [[id number]]  (Normal) Collected: 09/01/24 2049    Lab Status: Final result Specimen: Blood from Arm, Left Updated: 09/01/24 2124     hs TnI 0hr <2 ng/L     Comprehensive metabolic panel [[id number]]  (Abnormal) Collected: 09/01/24 2049    Lab Status: Final result Specimen: Blood from Arm, Left Updated: 09/01/24 2122     Sodium 143 mmol/L      Potassium 3.6 mmol/L      Chloride 110 mmol/L      CO2 20 mmol/L      ANION GAP 13 mmol/L      BUN 13 mg/dL      Creatinine 0.84 mg/dL      Glucose 109 mg/dL      Calcium 9.5 mg/dL      AST 15 U/L      ALT 18 U/L      Alkaline Phosphatase 53 U/L      Total Protein 7.1 g/dL      Albumin 4.6 g/dL      Total Bilirubin 0.32 mg/dL      eGFR 101 ml/min/1.73sq m     Narrative:      National Kidney Disease Foundation guidelines for Chronic Kidney Disease (CKD):     Stage 1 with normal or high GFR (GFR > 90 mL/min/1.73 square meters)    Stage 2 Mild CKD (GFR = 60-89 mL/min/1.73 square meters)    Stage 3A Moderate CKD (GFR = 45-59 mL/min/1.73 square meters)    Stage 3B Moderate CKD (GFR = 30-44 mL/min/1.73 square meters)    Stage 4 Severe CKD (GFR = 15-29 mL/min/1.73 square meters)    Stage 5 End Stage CKD (GFR <15 mL/min/1.73 square meters)  Note: GFR calculation is accurate only with a steady state creatinine    CBC and differential [[id number]] Collected: 09/01/24 2049    Lab Status: Final result Specimen: Blood from Arm, Left Updated: 09/01/24 2057     WBC 8.57 Thousand/uL      RBC 4.31 Million/uL      Hemoglobin 14.6 g/dL      Hematocrit 42.3 %      MCV 98 fL      MCH 33.9 pg      MCHC 34.5 g/dL      RDW 13.0 %      MPV 9.8 fL      Platelets 255 Thousands/uL      nRBC 0 /100 WBCs      Segmented % 48 %      Immature Grans % 0 %      Lymphocytes % 42 %      Monocytes % 5 %      Eosinophils Relative 4 %      Basophils Relative 1 %      Absolute Neutrophils 4.10 Thousands/µL       "Absolute Immature Grans 0.01 Thousand/uL      Absolute Lymphocytes 3.61 Thousands/µL      Absolute Monocytes 0.43 Thousand/µL      Eosinophils Absolute 0.35 Thousand/µL      Basophils Absolute 0.07 Thousands/µL                    XR chest 1 view portable    (Results Pending)              Procedures  ECG 12 Lead Documentation Only    Date/Time: 9/2/2024 2:34 AM    Performed by: Geraldo Pedroza DO  Authorized by: Geraldo Pedroza DO    Indications / Diagnosis:  Chest pain  ECG reviewed by me, the ED Provider: yes    Patient location:  ED  Previous ECG:     Previous ECG:  Compared to current  Comments:      NSR without signs of ischemia, infarction, dysrhythmia           ED Course                                 SBIRT 22yo+      Flowsheet Row Most Recent Value   Initial Alcohol Screen: US AUDIT-C     1. How often do you have a drink containing alcohol? 1 Filed at: 09/01/2024 2042   2. How many drinks containing alcohol do you have on a typical day you are drinking?  4 Filed at: 09/01/2024 2042   3a. Male UNDER 65: How often do you have five or more drinks on one occasion? 0 Filed at: 09/01/2024 2042   3b. FEMALE Any Age, or MALE 65+: How often do you have 4 or more drinks on one occassion? 0 Filed at: 09/01/2024 2042   Audit-C Score 5 Filed at: 09/01/2024 2042   XENA: How many times in the past year have you...    Used an illegal drug or used a prescription medication for non-medical reasons? Weekly Filed at: 09/01/2024 2042   DAST-10: In the past 12 months...    1. Have you used drugs other than those required for medical reasons? 0 Filed at: 09/01/2024 2042   2. Do you use more than one drug at a time? 0 Filed at: 09/01/2024 2042   3. Have you had medical problems as a result of your drug use (e.g., memory loss, hepatitis, convulsions, bleeding, etc.)? 0 Filed at: 09/01/2024 2042   4. Have you had \"blackouts\" or \"flashbacks\" as a result of drug use?YesNo 0 Filed at: 09/01/2024 2042   5. Do you ever feel bad or guilty about " your drug use? 0 Filed at: 09/01/2024 2042   6. Does your spouse (or parent) ever complain about your involvement with drugs? 0 Filed at: 09/01/2024 2042   7. Have you neglected your family because of your use of drugs? 0 Filed at: 09/01/2024 2042   8. Have you engaged in illegal activities in order to obtain drugs? 0 Filed at: 09/01/2024 2042   9. Have you ever experienced withdrawal symptoms (felt sick) when you stopped taking drugs? 0 Filed at: 09/01/2024 2042   10. Are you always able to stop using drugs when you want to? 0 Filed at: 09/01/2024 2042   DAST-10 Score 0 Filed at: 09/01/2024 2042                      Medical Decision Making  49-year-old male presenting to the emergency department after episodes of chest pain, staring, and psychosocial stressors in the setting of alcohol use.  CBC, CMP, EKG, chest x-ray, troponin x 2 without acute pathology.  No signs of PE.  These after mentioned episodes where patient is staring was seen in the emergency department by me and other members of staff.  His vital signs on telemetry were unchanged during these events and he was able to be redirected with painful stimuli such as sternal pressure and would be conversant intermittently during the staring events.  I noted concern about patient and family of the potential underlying psychosocial stressors being implicated in today's events.  Seizure activity nor cardiac activity present. Reviewed all findings both relevant and incidental with the patient at bedside. Pt verbalized understanding of findings, neccesary follow up, return to ED precautions. Pt agreed to review today's findings with their primary care provider. Pt non-toxic appearing upon discharge.       Amount and/or Complexity of Data Reviewed  Independent Historian: spouse  External Data Reviewed: notes.  Labs: ordered.  Radiology: ordered.  ECG/medicine tests: ordered and independent interpretation performed.    Risk  OTC drugs.  Diagnosis or treatment  significantly limited by social determinants of health.                 Disposition  Final diagnoses:   Chest pain, unspecified type   Psychosocial stressors     Time reflects when diagnosis was documented in both MDM as applicable and the Disposition within this note       Time User Action Codes Description Comment    9/1/2024 11:12 PM Geraldo Pedroza [R07.9] Chest pain, unspecified type     9/1/2024 11:13 PM Geraldo Pedroza [Z65.8] Psychosocial stressors           ED Disposition       ED Disposition   Discharge    Condition   Stable    Date/Time   Sun Sep 1, 2024 2316    Comment   Balta Damico discharge to home/self care.                   Follow-up Information       Follow up With Specialties Details Why Contact Info    [facility], Nurse Practitioner Call  For review of findings and symptoms. [address] [facility] [address]  [phone]              Discharge Medication List as of 9/1/2024 11:13 PM        CONTINUE these medications which have NOT CHANGED    Details   albuterol (PROVENTIL HFA,VENTOLIN HFA) 90 mcg/act inhaler TAKE 2 PUFFS BY MOUTH EVERY 6 HOURS AS NEEDED FOR WHEEZE, Normal      aspirin 81 mg chewable tablet Chew 81 mg daily, Historical Med      clobetasol (TEMOVATE) 0.05 % cream Apply topically 2 (two) times a day, Starting Mon 6/20/2022, Normal      Multiple Vitamins-Minerals (MULTIVITAMIN WITH MINERALS) tablet Take 1 tablet by mouth daily, Historical Med      oxybutynin (DITROPAN-XL) 10 MG 24 hr tablet TAKE 1 TABLET BY MOUTH DAILY AT BEDTIME, Normal      rosuvastatin (CRESTOR) 20 MG tablet TAKE 1 TABLET BY MOUTH EVERY DAY, Normal             No discharge procedures on file.    PDMP Review       None            ED Provider  Electronically Signed by             Geraldo Pedroza DO  09/02/24 0237

## 2024-09-03 LAB
ATRIAL RATE: 79 BPM
P AXIS: 59 DEGREES
PR INTERVAL: 134 MS
QRS AXIS: 57 DEGREES
QRSD INTERVAL: 98 MS
QT INTERVAL: 368 MS
QTC INTERVAL: 421 MS
T WAVE AXIS: 66 DEGREES
VENTRICULAR RATE: 79 BPM

## 2024-09-03 PROCEDURE — 93010 ELECTROCARDIOGRAM REPORT: CPT | Performed by: INTERNAL MEDICINE

## 2024-09-05 ENCOUNTER — OFFICE VISIT (OUTPATIENT)
Dept: URGENT CARE | Age: 49
End: 2024-09-05
Payer: COMMERCIAL

## 2024-09-05 VITALS
TEMPERATURE: 98.2 F | HEART RATE: 73 BPM | RESPIRATION RATE: 18 BRPM | SYSTOLIC BLOOD PRESSURE: 145 MMHG | DIASTOLIC BLOOD PRESSURE: 85 MMHG | OXYGEN SATURATION: 98 %

## 2024-09-05 DIAGNOSIS — B97.89 ACUTE VIRAL SINUSITIS: Primary | ICD-10-CM

## 2024-09-05 DIAGNOSIS — J01.90 ACUTE VIRAL SINUSITIS: Primary | ICD-10-CM

## 2024-09-05 PROCEDURE — 99213 OFFICE O/P EST LOW 20 MIN: CPT | Performed by: STUDENT IN AN ORGANIZED HEALTH CARE EDUCATION/TRAINING PROGRAM

## 2024-09-05 RX ORDER — FLUTICASONE PROPIONATE 50 MCG
1 SPRAY, SUSPENSION (ML) NASAL 2 TIMES DAILY
Qty: 11.1 ML | Refills: 0 | Status: SHIPPED | OUTPATIENT
Start: 2024-09-05

## 2024-09-05 RX ORDER — CETIRIZINE HYDROCHLORIDE, PSEUDOEPHEDRINE HYDROCHLORIDE 5; 120 MG/1; MG/1
1 TABLET, FILM COATED, EXTENDED RELEASE ORAL 2 TIMES DAILY
Qty: 12 TABLET | Refills: 0 | Status: SHIPPED | OUTPATIENT
Start: 2024-09-05 | End: 2024-09-11

## 2024-09-05 NOTE — LETTER
September 5, 2024     Patient: Balta Damico   YOB: 1975   Date of Visit: 9/5/2024       To Whom It May Concern:    It is my medical opinion that Balta Damico may return to work on 9/7/2024 .    If you have any questions or concerns, please don't hesitate to call.         Sincerely,        Juana Steinberg,     CC: No Recipients

## 2024-09-05 NOTE — PROGRESS NOTES
[facility]        NAME: Balta Damico is a 49 y.o. male  : 1975    MRN: [id number]  DATE: 2024  TIME: 1:50 PM    Assessment and Orders   Acute viral sinusitis [J01.90, B97.89]  1. Acute viral sinusitis  cetirizine-pseudoephedrine (ZyrTEC-D) 5-120 MG per tablet    fluticasone (FLONASE) 50 mcg/act nasal spray            Plan and Discussion      Symptoms and exam consistent with acute viral sinusitis. Will treat with zyrtec D and Flonase. Discussed smoking cessation.       Risks and benefits discussed. Patient understands and agrees with the plan.     PATIENT INSTRUCTIONS      If tests have been performed at [facility], our office will contact you with results if changes need to be made to the care plan discussed with you at the visit.  You can review your full results on [facility].    Follow up with PCP.     If any of the following occur, please report to your nearest ED for evaluation or call 911.   Difficultly breathing or shortness of breath  Chest pain  Acutely worsening symptoms.         Chief Complaint     Chief Complaint   Patient presents with    Cough    Dizziness    Headache    Generalized Body Aches    Fatigue    Fever     Symptoms above started yesterday.          History of Present Illness       Coughing up some mucous.  +body aches    Sinusitis  This is a new problem. The current episode started yesterday. The problem has been gradually worsening since onset. Maximum temperature: subjective fevers. Associated symptoms include chills, congestion, coughing, diaphoresis (feels clammy), headaches, shortness of breath, sinus pressure, sneezing and a sore throat. Pertinent negatives include no ear pain. Treatments tried: dayquil and tylenol cold and flu.       Review of Systems   Review of Systems   Constitutional:  Positive for chills and diaphoresis (feels clammy).   HENT:  Positive for congestion, sinus pressure, sneezing and sore throat. Negative for ear pain.     Respiratory:  Positive for cough and shortness of breath.    Neurological:  Positive for headaches.         Current Medications       Current Outpatient Medications:     albuterol (PROVENTIL HFA,VENTOLIN HFA) 90 mcg/act inhaler, TAKE 2 PUFFS BY MOUTH EVERY 6 HOURS AS NEEDED FOR WHEEZE, Disp: 8.5 Inhaler, Rfl: 0    aspirin 81 mg chewable tablet, Chew 81 mg daily, Disp: , Rfl:     cetirizine-pseudoephedrine (ZyrTEC-D) 5-120 MG per tablet, Take 1 tablet by mouth 2 (two) times a day for 6 days, Disp: 12 tablet, Rfl: 0    clobetasol (TEMOVATE) 0.05 % cream, Apply topically 2 (two) times a day, Disp: 15 g, Rfl: 0    fluticasone (FLONASE) 50 mcg/act nasal spray, 1 spray into each nostril 2 (two) times a day, Disp: 11.1 mL, Rfl: 0    Multiple Vitamins-Minerals (MULTIVITAMIN WITH MINERALS) tablet, Take 1 tablet by mouth daily, Disp: , Rfl:     oxybutynin (DITROPAN-XL) 10 MG 24 hr tablet, TAKE 1 TABLET BY MOUTH DAILY AT BEDTIME, Disp: 30 tablet, Rfl: 3    rosuvastatin (CRESTOR) 20 MG tablet, TAKE 1 TABLET BY MOUTH EVERY DAY, Disp: 90 tablet, Rfl: 1    Current Allergies     Allergies as of 09/05/2024 - Reviewed 09/05/2024   Allergen Reaction Noted    Penicillins  09/19/2016            The following portions of the patient's history were reviewed and updated as appropriate: allergies, current medications, past family history, past medical history, past social history, past surgical history and problem list.     Past Medical History:   Diagnosis Date    Asthma     Asthma 09/04/2012    Hyperlipidemia     Laceration of right middle finger without foreign body with damage to nail 04/01/2019    Lightheadedness 01/14/2019    Open displaced fracture of distal phalanx of right middle finger 04/01/2019    Urinary frequency        Past Surgical History:   Procedure Laterality Date    HERNIA REPAIR  1994       Family History   Problem Relation Age of Onset    No Known Problems Father     Mental illness Mother     No Known Problems  Maternal Grandmother     No Known Problems Brother          Medications have been verified.        Objective   /85   Pulse 73   Temp 98.2 °F (36.8 °C)   Resp 18   SpO2 98%   No LMP for male patient.       Physical Exam     Physical Exam  Constitutional:       General: He is not in acute distress.     Appearance: He is not ill-appearing or toxic-appearing.   HENT:      Head: Normocephalic and atraumatic.      Right Ear: Tympanic membrane and external ear normal.      Left Ear: Tympanic membrane and external ear normal.      Nose: Congestion present.      Comments: Boggy nasal turbinates     Mouth/Throat:      Pharynx: Posterior oropharyngeal erythema present.      Comments: Cobblestone appearance in posterior pharynx  Cardiovascular:      Rate and Rhythm: Normal rate and regular rhythm.   Pulmonary:      Effort: Pulmonary effort is normal. No respiratory distress.      Breath sounds: No wheezing.   Neurological:      General: No focal deficit present.      Mental Status: He is alert and oriented to person, place, and time.   Psychiatric:         Mood and Affect: Mood normal.         Behavior: Behavior normal.         Thought Content: Thought content normal.         Judgment: Judgment normal.               Juana Steinberg DO

## 2024-09-10 ENCOUNTER — OFFICE VISIT (OUTPATIENT)
Dept: CARDIOLOGY CLINIC | Facility: MEDICAL CENTER | Age: 49
End: 2024-09-10
Payer: COMMERCIAL

## 2024-09-10 VITALS
HEART RATE: 77 BPM | OXYGEN SATURATION: 97 % | DIASTOLIC BLOOD PRESSURE: 78 MMHG | SYSTOLIC BLOOD PRESSURE: 120 MMHG | HEIGHT: 71 IN | WEIGHT: 195 LBS | BODY MASS INDEX: 27.3 KG/M2

## 2024-09-10 DIAGNOSIS — E78.2 MIXED HYPERLIPIDEMIA: Primary | ICD-10-CM

## 2024-09-10 DIAGNOSIS — Z91.89 AT RISK FOR APNEA: ICD-10-CM

## 2024-09-10 DIAGNOSIS — R07.89 OTHER CHEST PAIN: ICD-10-CM

## 2024-09-10 DIAGNOSIS — R55 SYNCOPE AND COLLAPSE: ICD-10-CM

## 2024-09-10 PROCEDURE — 99204 OFFICE O/P NEW MOD 45 MIN: CPT | Performed by: INTERNAL MEDICINE

## 2024-09-10 RX ORDER — ROSUVASTATIN CALCIUM 20 MG/1
20 TABLET, COATED ORAL DAILY
Qty: 90 TABLET | Refills: 3 | Status: SHIPPED | OUTPATIENT
Start: 2024-09-10

## 2024-09-10 NOTE — ASSESSMENT & PLAN NOTE
Unclear etiology, however may have been related to dehydration in the setting of alcohol use.  Will screen for cardiac etiology of syncope with a stress test to rule out ischemic etiology as well as a Zio monitor to evaluate for arrhythmias.  Will also check an echocardiac to rule out structural heart disease.  We will also refer for sleep study as his wife relays that he has episodes of witnessed apnea at night.

## 2024-09-10 NOTE — PROGRESS NOTES
Cardiology Consultation     Balta Damico  [id number]  1975  [facility] [place]  [address]  [place] [address]    1. Mixed hyperlipidemia  Assessment & Plan:  Continued on Crestor 20 mg daily.  LDL most recently in chart noted to be 107 in July 2022.  Recheck levels now.  Orders:  -     Lipid panel  -     rosuvastatin (CRESTOR) 20 MG tablet; Take 1 tablet (20 mg total) by mouth daily  2. Syncope and collapse  Assessment & Plan:  Unclear etiology, however may have been related to dehydration in the setting of alcohol use.  Will screen for cardiac etiology of syncope with a stress test to rule out ischemic etiology as well as a Zio monitor to evaluate for arrhythmias.  Will also check an echocardiac to rule out structural heart disease.  We will also refer for sleep study as his wife relays that he has episodes of witnessed apnea at night.  Orders:  -     Stress test only, exercise; Future; Expected date: 09/10/2024  -     Echo complete w/ contrast if indicated; Future; Expected date: 09/10/2024  -     AMB extended holter monitor; Future; Expected date: 09/10/2024  3. Other chest pain  Assessment & Plan:  Seems to have occurred with the episode of syncope.  Has a few risk factors for heart disease including age/man, hyperlipidemia, current smoker.  Will proceed with testing to evaluate for ischemic disease.  Will also check an echocardiogram to assess for structural heart disease.  Orders:  -     Stress test only, exercise; Future; Expected date: 09/10/2024  -     Echo complete w/ contrast if indicated; Future; Expected date: 09/10/2024  4. At risk for apnea  -     Ambulatory Referral to Sleep Medicine; Future       Chief Complaint   Patient presents with    New Patient Visit     Hospital follow up for unresponsiveness which has happened before in the past. Patient states he feels okay now.       HPI: Patient is here for cardiac evaluation of  "episodes of syncope.  He has had an episode in the past few years ago, similar to the one that had him presenting to the emergency room earlier this month.  In a similar fashion, patient was drinking ahead of time.  This time he did also develop sudden onset of chest pain.  Patient feels well, without complaints.  No reported chest pain, shortness of breath, palpitations, lightheadedness, syncope, LE edema, orthopnea, PND, or significant weight changes.  Patient remains active without any increased fatigue out of the ordinary.        Vitals:    09/10/24 1331   BP: 120/78   BP Location: Left arm   Patient Position: Sitting   Cuff Size: Adult   Pulse: 77   SpO2: 97%   Weight: 88.5 kg (195 lb)   Height: 5' 11\" (1.803 m)       EKG:  EKG from emergency room personally reviewed:  Normal sinus rhythm  Normal ECG    Review of Systems:  Review of Systems   Constitutional:  Negative for activity change, appetite change, fatigue and fever.   HENT:  Negative for nosebleeds and sore throat.    Eyes:  Negative for photophobia and visual disturbance.   Respiratory:  Negative for cough, chest tightness, shortness of breath and wheezing.    Cardiovascular:  Negative for chest pain, palpitations and leg swelling.   Gastrointestinal:  Negative for abdominal pain, diarrhea, nausea and vomiting.   Endocrine: Negative for polyuria.   Genitourinary:  Negative for dysuria, frequency and hematuria.   Musculoskeletal:  Negative for arthralgias, back pain and gait problem.   Skin:  Negative for pallor and rash.   Neurological:  Negative for dizziness, syncope, speech difficulty and light-headedness.   Hematological:  Does not bruise/bleed easily.   Psychiatric/Behavioral:  Negative for agitation, behavioral problems and confusion.        Physical Exam:  Physical Exam  Vitals reviewed.   Constitutional:       General: He is not in acute distress.     Appearance: Normal appearance. He is well-developed. He is not diaphoretic.   HENT:      Head: " Normocephalic and atraumatic.      Nose: Nose normal.   Eyes:      General: No scleral icterus.     Extraocular Movements: Extraocular movements intact.      Pupils: Pupils are equal, round, and reactive to light.   Neck:      Vascular: No JVD.   Cardiovascular:      Rate and Rhythm: Normal rate and regular rhythm.      Heart sounds: S1 normal and S2 normal. Heart sounds not distant. No murmur heard.     No systolic murmur is present.      No friction rub. No gallop. No S3 sounds.   Pulmonary:      Effort: Pulmonary effort is normal. No respiratory distress.      Breath sounds: Normal breath sounds. No wheezing or rales.   Abdominal:      General: Bowel sounds are normal. There is no distension.      Palpations: Abdomen is soft.   Musculoskeletal:         General: No deformity.      Cervical back: Normal range of motion and neck supple.      Right lower leg: No edema.      Left lower leg: No edema.   Skin:     General: Skin is warm and dry.      Findings: No erythema.   Neurological:      Mental Status: He is alert and oriented to person, place, and time.      Cranial Nerves: No cranial nerve deficit.   Psychiatric:         Mood and Affect: Mood normal.         Behavior: Behavior normal.         Patient Active Problem List   Diagnosis    Erectile dysfunction    Hyperlipidemia    Asthma    Nicotine dependence    Psoriasis    Urinary frequency    Annual physical exam    Overweight (BMI 25.0-29.9)    Elevated fasting blood sugar    Tobacco abuse    Neck pain    Abnormal CBC    Flu vaccine need    Syncope and collapse    Other chest pain     Past Medical History:   Diagnosis Date    Asthma     Asthma 09/04/2012    Hyperlipidemia     Laceration of right middle finger without foreign body with damage to nail 04/01/2019    Lightheadedness 01/14/2019    Open displaced fracture of distal phalanx of right middle finger 04/01/2019    Urinary frequency      Social History     Socioeconomic History    Marital status:  /Civil Union     Spouse name: Not on file    Number of children: Not on file    Years of education: Not on file    Highest education level: Not on file   Occupational History    Not on file   Tobacco Use    Smoking status: Every Day     Types: Cigarettes    Smokeless tobacco: Never   Vaping Use    Vaping status: Never Used   Substance and Sexual Activity    Alcohol use: Yes     Comment: 2 beers a day    Drug use: Yes     Types: Marijuana     Comment: Occ.    Sexual activity: Yes   Other Topics Concern    Not on file   Social History Narrative    Not on file     Social Determinants of Health     Financial Resource Strain: Not on file   Food Insecurity: Not on file   Transportation Needs: Not on file   Physical Activity: Not on file   Stress: Not on file   Social Connections: Not on file   Intimate Partner Violence: Not on file   Housing Stability: Not on file      Family History   Problem Relation Age of Onset    No Known Problems Father     Mental illness Mother     No Known Problems Maternal Grandmother     No Known Problems Brother      Past Surgical History:   Procedure Laterality Date    HERNIA REPAIR  1994       Current Outpatient Medications:     albuterol (PROVENTIL HFA,VENTOLIN HFA) 90 mcg/act inhaler, TAKE 2 PUFFS BY MOUTH EVERY 6 HOURS AS NEEDED FOR WHEEZE, Disp: 8.5 Inhaler, Rfl: 0    aspirin 81 mg chewable tablet, Chew 81 mg daily, Disp: , Rfl:     cetirizine-pseudoephedrine (ZyrTEC-D) 5-120 MG per tablet, Take 1 tablet by mouth 2 (two) times a day for 6 days, Disp: 12 tablet, Rfl: 0    clobetasol (TEMOVATE) 0.05 % cream, Apply topically 2 (two) times a day, Disp: 15 g, Rfl: 0    fluticasone (FLONASE) 50 mcg/act nasal spray, 1 spray into each nostril 2 (two) times a day, Disp: 11.1 mL, Rfl: 0    Multiple Vitamins-Minerals (MULTIVITAMIN WITH MINERALS) tablet, Take 1 tablet by mouth daily, Disp: , Rfl:     oxybutynin (DITROPAN-XL) 10 MG 24 hr tablet, TAKE 1 TABLET BY MOUTH DAILY AT BEDTIME, Disp: 30  tablet, Rfl: 3    rosuvastatin (CRESTOR) 20 MG tablet, Take 1 tablet (20 mg total) by mouth daily, Disp: 90 tablet, Rfl: 3  Allergies   Allergen Reactions    Penicillins        Labs:  Admission on 09/01/2024, Discharged on 09/01/2024   Component Date Value    Ventricular Rate 09/01/2024 79     Atrial Rate 09/01/2024 79     [place] Interval 09/01/2024 134     QRSD Interval 09/01/2024 98     QT Interval 09/01/2024 368     QTC Interval 09/01/2024 421     P Axis 09/01/2024 59     QRS Axis 09/01/2024 57     T Wave Axis 09/01/2024 66     WBC 09/01/2024 8.57     RBC 09/01/2024 4.31     Hemoglobin 09/01/2024 14.6     Hematocrit 09/01/2024 42.3     MCV 09/01/2024 98     MCH 09/01/2024 33.9     MCHC 09/01/2024 34.5     RDW 09/01/2024 13.0     MPV 09/01/2024 9.8     Platelets 09/01/2024 255     nRBC 09/01/2024 0     Segmented % 09/01/2024 48     Immature Grans % 09/01/2024 0     Lymphocytes % 09/01/2024 42     Monocytes % 09/01/2024 5     Eosinophils Relative 09/01/2024 4     Basophils Relative 09/01/2024 1     Absolute Neutrophils 09/01/2024 4.10     Absolute Immature Grans 09/01/2024 0.01     Absolute Lymphocytes 09/01/2024 3.61     Absolute Monocytes 09/01/2024 0.43     Eosinophils Absolute 09/01/2024 0.35     Basophils Absolute 09/01/2024 0.07     Sodium 09/01/2024 143     Potassium 09/01/2024 3.6     Chloride 09/01/2024 110 (H)     CO2 09/01/2024 20 (L)     ANION GAP 09/01/2024 13     BUN 09/01/2024 13     Creatinine 09/01/2024 0.84     Glucose 09/01/2024 109     Calcium 09/01/2024 9.5     AST 09/01/2024 15     ALT 09/01/2024 18     Alkaline Phosphatase 09/01/2024 53     Total Protein 09/01/2024 7.1     Albumin 09/01/2024 4.6     Total Bilirubin 09/01/2024 0.32     eGFR 09/01/2024 101     hs TnI 0hr 09/01/2024 <2     hs TnI 2hr 09/01/2024 <2     Delta 2hr hsTnI 09/01/2024       Lab Results   Component Value Date    CHOL 250 (H) 04/26/2017    TRIG 206 (H) 07/19/2022    HDL 41 07/19/2022     Imaging: XR chest 1 view  portable    Result Date: 9/3/2024  Narrative: XR CHEST PORTABLE INDICATION: cp.   Per my review of the medical record, patient states he was at a party drinking and had sudden chest pain. EMS reports patient had episodes of staring and was unresponsive answering questions. COMPARISON: CXR 9/21/2020 and 1/2/2019. FINDINGS: Clear lungs. No pneumothorax or pleural effusion. Normal cardiomediastinal silhouette. Bones are unremarkable for age. Normal upper abdomen.     Impression: No acute cardiopulmonary disease. Workstation performed: [id number]

## 2024-09-10 NOTE — ASSESSMENT & PLAN NOTE
Seems to have occurred with the episode of syncope.  Has a few risk factors for heart disease including age/man, hyperlipidemia, current smoker.  Will proceed with testing to evaluate for ischemic disease.  Will also check an echocardiogram to assess for structural heart disease.

## 2024-09-10 NOTE — LETTER
September 10, 2024     TAI Salcedo    Patient: Balta Damico   YOB: 1975   Date of Visit: 9/10/2024       Dear Dr. Bruno:    Thank you for referring Balta Damico to me for evaluation. Below are my notes for this consultation.    If you have questions, please do not hesitate to call me. I look forward to following your patient along with you.         Sincerely,        Jessee Soliman MD        CC: No Recipients    Jessee Soliman MD  9/10/2024  1:46 PM  Sign when Signing Visit   Cardiology Consultation     Balta Damico  [id number]  1975  [facility] [address] [address]    1. Mixed hyperlipidemia  Assessment & Plan:  Continued on Crestor 20 mg daily.  LDL most recently in chart noted to be 107 in July 2022.  Recheck levels now.  2. Syncope and collapse  Assessment & Plan:  Unclear etiology, however may have been related to dehydration in the setting of alcohol use.  Will screen for cardiac etiology of syncope with a stress test to rule out ischemic etiology as well as a Zio monitor to evaluate for arrhythmias.  Will also check an echocardiac to rule out structural heart disease.  3. Other chest pain  Assessment & Plan:  Seems to have occurred with the episode of syncope.  Has a few risk factors for heart disease including age/man, hyperlipidemia, current smoker.  Will proceed with testing to evaluate for ischemic disease.  Will also check an echocardiogram to assess for structural heart disease.       Chief Complaint   Patient presents with   • New Patient Visit     Hospital follow up for unresponsiveness which has happened before in the past. Patient states he feels okay now.       HPI: Patient is here for cardiac evaluation of episodes of syncope.  He has had an episode in the past few years ago, similar to the one that had him presenting to the emergency room earlier this month.  In  "a similar fashion, patient was drinking ahead of time.  This time he did also develop sudden onset of chest pain.  Patient feels well, without complaints.  No reported chest pain, shortness of breath, palpitations, lightheadedness, syncope, LE edema, orthopnea, PND, or significant weight changes.  Patient remains active without any increased fatigue out of the ordinary.        Vitals:    09/10/24 1331   BP: 120/78   BP Location: Left arm   Patient Position: Sitting   Cuff Size: Adult   Pulse: 77   SpO2: 97%   Weight: 88.5 kg (195 lb)   Height: 5' 11\" (1.803 m)       EKG:  EKG from emergency room personally reviewed:  Normal sinus rhythm  Normal ECG    Review of Systems:  Review of Systems   Constitutional:  Negative for activity change, appetite change, fatigue and fever.   HENT:  Negative for nosebleeds and sore throat.    Eyes:  Negative for photophobia and visual disturbance.   Respiratory:  Negative for cough, chest tightness, shortness of breath and wheezing.    Cardiovascular:  Negative for chest pain, palpitations and leg swelling.   Gastrointestinal:  Negative for abdominal pain, diarrhea, nausea and vomiting.   Endocrine: Negative for polyuria.   Genitourinary:  Negative for dysuria, frequency and hematuria.   Musculoskeletal:  Negative for arthralgias, back pain and gait problem.   Skin:  Negative for pallor and rash.   Neurological:  Negative for dizziness, syncope, speech difficulty and light-headedness.   Hematological:  Does not bruise/bleed easily.   Psychiatric/Behavioral:  Negative for agitation, behavioral problems and confusion.        Physical Exam:  Physical Exam  Vitals reviewed.   Constitutional:       General: He is not in acute distress.     Appearance: Normal appearance. He is well-developed. He is not diaphoretic.   HENT:      Head: Normocephalic and atraumatic.      Nose: Nose normal.   Eyes:      General: No scleral icterus.     Extraocular Movements: Extraocular movements intact.      " Pupils: Pupils are equal, round, and reactive to light.   Neck:      Vascular: No JVD.   Cardiovascular:      Rate and Rhythm: Normal rate and regular rhythm.      Heart sounds: S1 normal and S2 normal. Heart sounds not distant. No murmur heard.     No systolic murmur is present.      No friction rub. No gallop. No S3 sounds.   Pulmonary:      Effort: Pulmonary effort is normal. No respiratory distress.      Breath sounds: Normal breath sounds. No wheezing or rales.   Abdominal:      General: Bowel sounds are normal. There is no distension.      Palpations: Abdomen is soft.   Musculoskeletal:         General: No deformity.      Cervical back: Normal range of motion and neck supple.      Right lower leg: No edema.      Left lower leg: No edema.   Skin:     General: Skin is warm and dry.      Findings: No erythema.   Neurological:      Mental Status: He is alert and oriented to person, place, and time.      Cranial Nerves: No cranial nerve deficit.   Psychiatric:         Mood and Affect: Mood normal.         Behavior: Behavior normal.         Patient Active Problem List   Diagnosis   • Erectile dysfunction   • Hyperlipidemia   • Asthma   • Nicotine dependence   • Psoriasis   • Urinary frequency   • Annual physical exam   • Overweight (BMI 25.0-29.9)   • Elevated fasting blood sugar   • Tobacco abuse   • Neck pain   • Abnormal CBC   • Flu vaccine need   • Syncope and collapse   • Other chest pain     Past Medical History:   Diagnosis Date   • Asthma    • Asthma 09/04/2012   • Hyperlipidemia    • Laceration of right middle finger without foreign body with damage to nail 04/01/2019   • Lightheadedness 01/14/2019   • Open displaced fracture of distal phalanx of right middle finger 04/01/2019   • Urinary frequency      Social History     Socioeconomic History   • Marital status: /Civil Union     Spouse name: Not on file   • Number of children: Not on file   • Years of education: Not on file   • Highest education  level: Not on file   Occupational History   • Not on file   Tobacco Use   • Smoking status: Every Day     Types: Cigarettes   • Smokeless tobacco: Never   Vaping Use   • Vaping status: Never Used   Substance and Sexual Activity   • Alcohol use: Yes     Comment: 2 beers a day   • Drug use: Yes     Types: Marijuana     Comment: Occ.   • Sexual activity: Yes   Other Topics Concern   • Not on file   Social History Narrative   • Not on file     Social Determinants of Health     Financial Resource Strain: Not on file   Food Insecurity: Not on file   Transportation Needs: Not on file   Physical Activity: Not on file   Stress: Not on file   Social Connections: Not on file   Intimate Partner Violence: Not on file   Housing Stability: Not on file      Family History   Problem Relation Age of Onset   • No Known Problems Father    • Mental illness Mother    • No Known Problems Maternal Grandmother    • No Known Problems Brother      Past Surgical History:   Procedure Laterality Date   • HERNIA REPAIR  1994       Current Outpatient Medications:   •  albuterol (PROVENTIL HFA,VENTOLIN HFA) 90 mcg/act inhaler, TAKE 2 PUFFS BY MOUTH EVERY 6 HOURS AS NEEDED FOR WHEEZE, Disp: 8.5 Inhaler, Rfl: 0  •  aspirin 81 mg chewable tablet, Chew 81 mg daily, Disp: , Rfl:   •  cetirizine-pseudoephedrine (ZyrTEC-D) 5-120 MG per tablet, Take 1 tablet by mouth 2 (two) times a day for 6 days, Disp: 12 tablet, Rfl: 0  •  clobetasol (TEMOVATE) 0.05 % cream, Apply topically 2 (two) times a day, Disp: 15 g, Rfl: 0  •  fluticasone (FLONASE) 50 mcg/act nasal spray, 1 spray into each nostril 2 (two) times a day, Disp: 11.1 mL, Rfl: 0  •  Multiple Vitamins-Minerals (MULTIVITAMIN WITH MINERALS) tablet, Take 1 tablet by mouth daily, Disp: , Rfl:   •  oxybutynin (DITROPAN-XL) 10 MG 24 hr tablet, TAKE 1 TABLET BY MOUTH DAILY AT BEDTIME, Disp: 30 tablet, Rfl: 3  •  rosuvastatin (CRESTOR) 20 MG tablet, TAKE 1 TABLET BY MOUTH EVERY DAY, Disp: 90 tablet, Rfl:  1  Allergies   Allergen Reactions   • Penicillins        Labs:  Admission on 09/01/2024, Discharged on 09/01/2024   Component Date Value   • Ventricular Rate 09/01/2024 79    • Atrial Rate 09/01/2024 79    • [place] Interval 09/01/2024 134    • QRSD Interval 09/01/2024 98    • QT Interval 09/01/2024 368    • QTC Interval 09/01/2024 421    • P Axis 09/01/2024 59    • QRS Axis 09/01/2024 57    • T Wave Axis 09/01/2024 66    • WBC 09/01/2024 8.57    • RBC 09/01/2024 4.31    • Hemoglobin 09/01/2024 14.6    • Hematocrit 09/01/2024 42.3    • MCV 09/01/2024 98    • MCH 09/01/2024 33.9    • MCHC 09/01/2024 34.5    • RDW 09/01/2024 13.0    • MPV 09/01/2024 9.8    • Platelets 09/01/2024 255    • nRBC 09/01/2024 0    • Segmented % 09/01/2024 48    • Immature Grans % 09/01/2024 0    • Lymphocytes % 09/01/2024 42    • Monocytes % 09/01/2024 5    • Eosinophils Relative 09/01/2024 4    • Basophils Relative 09/01/2024 1    • Absolute Neutrophils 09/01/2024 4.10    • Absolute Immature Grans 09/01/2024 0.01    • Absolute Lymphocytes 09/01/2024 3.61    • Absolute Monocytes 09/01/2024 0.43    • Eosinophils Absolute 09/01/2024 0.35    • Basophils Absolute 09/01/2024 0.07    • Sodium 09/01/2024 143    • Potassium 09/01/2024 3.6    • Chloride 09/01/2024 110 (H)    • CO2 09/01/2024 20 (L)    • ANION GAP 09/01/2024 13    • BUN 09/01/2024 13    • Creatinine 09/01/2024 0.84    • Glucose 09/01/2024 109    • Calcium 09/01/2024 9.5    • AST 09/01/2024 15    • ALT 09/01/2024 18    • Alkaline Phosphatase 09/01/2024 53    • Total Protein 09/01/2024 7.1    • Albumin 09/01/2024 4.6    • Total Bilirubin 09/01/2024 0.32    • eGFR 09/01/2024 101    • hs TnI 0hr 09/01/2024 <2    • hs TnI 2hr 09/01/2024 <2    • Delta 2hr hsTnI 09/01/2024       Lab Results   Component Value Date    CHOL 250 (H) 04/26/2017    TRIG 206 (H) 07/19/2022    HDL 41 07/19/2022     Imaging: XR chest 1 view portable    Result Date: 9/3/2024  Narrative: XR CHEST PORTABLE INDICATION: cp.    Per my review of the medical record, patient states he was at a party drinking and had sudden chest pain. EMS reports patient had episodes of staring and was unresponsive answering questions. COMPARISON: CXR 9/21/2020 and 1/2/2019. FINDINGS: Clear lungs. No pneumothorax or pleural effusion. Normal cardiomediastinal silhouette. Bones are unremarkable for age. Normal upper abdomen.     Impression: No acute cardiopulmonary disease. Workstation performed: [id number]

## 2024-09-10 NOTE — ASSESSMENT & PLAN NOTE
Continued on Crestor 20 mg daily.  LDL most recently in chart noted to be 107 in July 2022.  Recheck levels now.

## 2024-09-11 ENCOUNTER — TELEPHONE (OUTPATIENT)
Age: 49
End: 2024-09-11

## 2024-09-11 NOTE — TELEPHONE ENCOUNTER
Pt uses [facility] and does not have the lipid panel that was ordered yesterday.    His spouse will stop by the office today to  the lab slip

## 2024-09-15 ENCOUNTER — TRANSCRIBE ORDERS (OUTPATIENT)
Dept: SLEEP CENTER | Facility: CLINIC | Age: 49
End: 2024-09-15

## 2024-09-15 DIAGNOSIS — Z91.89 AT RISK FOR APNEA: Primary | ICD-10-CM

## 2024-09-15 LAB
CHOLEST SERPL-MCNC: 189 MG/DL
CHOLEST/HDLC SERPL: 4.7 (CALC)
HDLC SERPL-MCNC: 40 MG/DL
LDLC SERPL CALC-MCNC: 126 MG/DL (CALC)
NONHDLC SERPL-MCNC: 149 MG/DL (CALC)
TRIGL SERPL-MCNC: 120 MG/DL

## 2024-09-16 ENCOUNTER — TELEPHONE (OUTPATIENT)
Dept: CARDIOLOGY CLINIC | Facility: CLINIC | Age: 49
End: 2024-09-16

## 2024-09-16 NOTE — TELEPHONE ENCOUNTER
CHETNAM to call office back for lipid results per Dr. Soliman.       If pt calls back, please relay message of - Please call the patient regarding his lipid panel result.  Please let him know that his LDL is significantly elevated.  Please ensure that he is taking his statin medication.  If not, then please ask him to resume taking it and we can recheck lipid panel in 3 months to make sure the levels are appropriate.

## 2024-10-07 ENCOUNTER — HOSPITAL ENCOUNTER (OUTPATIENT)
Dept: NON INVASIVE DIAGNOSTICS | Facility: HOSPITAL | Age: 49
Discharge: HOME | End: 2024-10-07
Attending: INTERNAL MEDICINE
Payer: COMMERCIAL

## 2024-10-07 ENCOUNTER — TELEPHONE (OUTPATIENT)
Age: 49
End: 2024-10-07

## 2024-10-07 VITALS
DIASTOLIC BLOOD PRESSURE: 78 MMHG | BODY MASS INDEX: 27.3 KG/M2 | HEIGHT: 71 IN | WEIGHT: 195 LBS | HEART RATE: 65 BPM | SYSTOLIC BLOOD PRESSURE: 120 MMHG

## 2024-10-07 DIAGNOSIS — R55 SYNCOPE AND COLLAPSE: ICD-10-CM

## 2024-10-07 DIAGNOSIS — R07.89 OTHER CHEST PAIN: ICD-10-CM

## 2024-10-07 LAB
AORTIC ROOT: 3.1 CM
ASCENDING AORTA: 3.4 CM
BSA FOR ECHO PROCEDURE: 2.09 M2
E WAVE DECELERATION TIME: 204 MS
E/A RATIO: 0.84
FRACTIONAL SHORTENING: 44 (ref 28–44)
INTERVENTRICULAR SEPTUM IN DIASTOLE (PARASTERNAL SHORT AXIS VIEW): 1 CM
INTERVENTRICULAR SEPTUM: 1 CM (ref 0.6–1.1)
LAAS-AP2: 16.8 CM2
LAAS-AP4: 9.3 CM2
LEFT ATRIUM SIZE: 3.7 CM
LEFT ATRIUM VOLUME (MOD BIPLANE): 31 ML
LEFT ATRIUM VOLUME INDEX (MOD BIPLANE): 14.8 ML/M2
LEFT INTERNAL DIMENSION IN SYSTOLE: 2.8 CM (ref 2.1–4)
LEFT VENTRICULAR INTERNAL DIMENSION IN DIASTOLE: 5 CM (ref 3.5–6)
LEFT VENTRICULAR POSTERIOR WALL IN END DIASTOLE: 1 CM
LEFT VENTRICULAR STROKE VOLUME: 92 ML
LVSV (TEICH): 92 ML
MAX HR PERCENT: 78 %
MAX HR: 133 BPM
MV E'TISSUE VEL-SEP: 10 CM/S
MV PEAK A VEL: 0.69 M/S
MV PEAK E VEL: 58 CM/S
MV STENOSIS PRESSURE HALF TIME: 59 MS
MV VALVE AREA P 1/2 METHOD: 3.73
RATE PRESSURE PRODUCT: NORMAL
RIGHT ATRIUM AREA SYSTOLE A4C: 15.9 CM2
RIGHT VENTRICLE ID DIMENSION: 4 CM
SL CV LEFT ATRIUM LENGTH A2C: 4.1 CM
SL CV LV EF: 60
SL CV PED ECHO LEFT VENTRICLE DIASTOLIC VOLUME (MOD BIPLANE) 2D: 121 ML
SL CV PED ECHO LEFT VENTRICLE SYSTOLIC VOLUME (MOD BIPLANE) 2D: 29 ML
SL CV STRESS RECOVERY BP: NORMAL MMHG
SL CV STRESS RECOVERY HR: 76 BPM
SL CV STRESS RECOVERY O2 SAT: 98 %
SL CV STRESS STAGE REACHED: 4
STRESS ANGINA INDEX: 0
STRESS BASELINE BP: NORMAL MMHG
STRESS BASELINE HR: 61 BPM
STRESS DUKE TREADMILL SCORE: 10
STRESS O2 SAT REST: 99 %
STRESS PEAK HR: 133 BPM
STRESS POST ESTIMATED WORKLOAD: 12.2 METS
STRESS POST EXERCISE DUR MIN: 10 MIN
STRESS POST EXERCISE DUR SEC: 19 SEC
STRESS POST O2 SAT PEAK: 98 %
STRESS POST PEAK BP: 174 MMHG
STRESS ST DEPRESSION: 0 MM
TRICUSPID ANNULAR PLANE SYSTOLIC EXCURSION: 2.3 CM

## 2024-10-07 PROCEDURE — 93016 CV STRESS TEST SUPVJ ONLY: CPT | Performed by: INTERNAL MEDICINE

## 2024-10-07 PROCEDURE — 93306 TTE W/DOPPLER COMPLETE: CPT

## 2024-10-07 PROCEDURE — 93017 CV STRESS TEST TRACING ONLY: CPT

## 2024-10-07 PROCEDURE — 93018 CV STRESS TEST I&R ONLY: CPT | Performed by: INTERNAL MEDICINE

## 2024-10-07 PROCEDURE — 93306 TTE W/DOPPLER COMPLETE: CPT | Performed by: INTERNAL MEDICINE

## 2024-10-07 NOTE — TELEPHONE ENCOUNTER
Patients Spouse called to inform us that zio patch still has not arrived . Was ordered 9/10 by . Please advise and give her a call if necessary. Thanks        [phone] ([phone])

## 2024-10-08 LAB
CHEST PAIN STATEMENT: NORMAL
MAX DIASTOLIC BP: 92 MMHG
MAX PREDICTED HEART RATE: 169 BPM
PROTOCOL NAME: NORMAL
REASON FOR TERMINATION: NORMAL
STRESS POST EXERCISE DUR MIN: 10 MIN
STRESS POST EXERCISE DUR SEC: 19 SEC
STRESS POST PEAK HR: 133 BPM
STRESS POST PEAK SYSTOLIC BP: 174 MMHG
TARGET HR FORMULA: NORMAL
TEST INDICATION: NORMAL

## 2024-11-04 ENCOUNTER — OFFICE VISIT (OUTPATIENT)
Dept: CARDIOLOGY CLINIC | Facility: MEDICAL CENTER | Age: 49
End: 2024-11-04
Payer: COMMERCIAL

## 2024-11-04 VITALS
WEIGHT: 195.1 LBS | HEIGHT: 71 IN | OXYGEN SATURATION: 97 % | BODY MASS INDEX: 27.31 KG/M2 | DIASTOLIC BLOOD PRESSURE: 92 MMHG | SYSTOLIC BLOOD PRESSURE: 132 MMHG | HEART RATE: 75 BPM

## 2024-11-04 DIAGNOSIS — R07.89 OTHER CHEST PAIN: ICD-10-CM

## 2024-11-04 DIAGNOSIS — R55 SYNCOPE AND COLLAPSE: ICD-10-CM

## 2024-11-04 DIAGNOSIS — E78.2 MIXED HYPERLIPIDEMIA: Primary | ICD-10-CM

## 2024-11-04 PROCEDURE — 99214 OFFICE O/P EST MOD 30 MIN: CPT | Performed by: INTERNAL MEDICINE

## 2024-11-04 RX ORDER — METOPROLOL TARTRATE 50 MG
50 TABLET ORAL ONCE
Qty: 1 TABLET | Refills: 0 | Status: SHIPPED | OUTPATIENT
Start: 2024-11-04 | End: 2024-11-04

## 2024-11-04 NOTE — ASSESSMENT & PLAN NOTE
Continued on Crestor 20 mg daily.    LDL in chart noted to be 107 in July 2022.    Repeat levels in September 2024 revealed an LDL of 126.

## 2024-11-04 NOTE — ASSESSMENT & PLAN NOTE
Unclear etiology, however may have been related to dehydration in the setting of alcohol use.    We screened for cardiac etiology of syncope with a stress test that ruled out ischemic etiology.  He had good exercise capacity, with no chest pain during the test and achieving 12.2 METS.  Although he only obtained 78% of max predicted heart rate, with good exercise tolerance without symptoms, as well as no significant ST changes that were developing, this can be used as diagnostic for ruling out ischemic etiology - but with continued symptoms, will continue work-up to have a more definitive answer with coronary CTA.  We also ordered a Zio monitor to evaluate for arrhythmias -still pending, will call patient with results, once available  We also checked an echocardiogram to rule out structural heart disease.  This revealed normal LV size and function with no significant valvular disease  We also referred for sleep study as his wife relays that he has episodes of witnessed apnea at night - which is also pending

## 2024-11-04 NOTE — PROGRESS NOTES
Cardiology Consultation     Balta Damico  [id number]  1975  [facility] [place]  [address]  [place] [address]    Assessment & Plan  Mixed hyperlipidemia  Continued on Crestor 20 mg daily.    LDL in chart noted to be 107 in July 2022.    Repeat levels in September 2024 revealed an LDL of 126.  Syncope and collapse  Unclear etiology, however may have been related to dehydration in the setting of alcohol use.    We screened for cardiac etiology of syncope with a stress test that ruled out ischemic etiology.  He had good exercise capacity, with no chest pain during the test and achieving 12.2 METS.  Although he only obtained 78% of max predicted heart rate, with good exercise tolerance without symptoms, as well as no significant ST changes that were developing, this can be used as diagnostic for ruling out ischemic etiology - but with continued symptoms, will continue work-up to have a more definitive answer with coronary CTA.  We also ordered a Zio monitor to evaluate for arrhythmias -still pending, will call patient with results, once available  We also checked an echocardiogram to rule out structural heart disease.  This revealed normal LV size and function with no significant valvular disease  We also referred for sleep study as his wife relays that he has episodes of witnessed apnea at night - which is also pending  Other chest pain  Seems to have occurred with the episode of syncope.    Has a few risk factors for heart disease including age/man, hyperlipidemia, current smoker.    Stress testing did not reveal any significant concern for ischemic disease   Echocardiogram revealed no significant structural heart abnormalities    Chief Complaint   Patient presents with    Chest Pain    Palpitations     Pt has palps when exerting himself.       HPI: Patient is here for cardiac evaluation of episodes of syncope.  He has had an episode in the past  "few years ago, similar to the one that had him presenting to the emergency room.  In a similar fashion, patient was drinking ahead of time.  Patient had another episode of chest pain and shortness of breath.  This was in the setting of dancing and rest improved.  Unfortunately not while he was wearing the monitor, also associated with onset of chest pain.  No reported shortness of breath, palpitations, lightheadedness, LE edema, orthopnea, PND, or significant weight changes.  Patient remains active without any increased fatigue out of the ordinary.      Vitals:    11/04/24 0900   BP: 132/92   BP Location: Left arm   Patient Position: Sitting   Cuff Size: Adult   Pulse: 75   SpO2: 97%   Weight: 88.5 kg (195 lb 1.6 oz)   Height: 5' 11\" (1.803 m)       EKG:  EKG from emergency room personally reviewed:  Normal sinus rhythm  Normal ECG    Review of Systems:  Review of Systems   Constitutional:  Negative for activity change, appetite change, fatigue and fever.   HENT:  Negative for nosebleeds and sore throat.    Eyes:  Negative for photophobia and visual disturbance.   Respiratory:  Negative for cough, chest tightness, shortness of breath and wheezing.    Cardiovascular:  Positive for chest pain. Negative for palpitations and leg swelling.   Gastrointestinal:  Negative for abdominal pain, diarrhea, nausea and vomiting.   Endocrine: Negative for polyuria.   Genitourinary:  Negative for dysuria, frequency and hematuria.   Musculoskeletal:  Negative for arthralgias, back pain and gait problem.   Skin:  Negative for pallor and rash.   Neurological:  Positive for syncope. Negative for dizziness, speech difficulty and light-headedness.   Hematological:  Does not bruise/bleed easily.   Psychiatric/Behavioral:  Negative for agitation, behavioral problems and confusion.        Physical Exam:  Physical Exam  Vitals reviewed.   Constitutional:       General: He is not in acute distress.     Appearance: Normal appearance. He is " well-developed. He is not diaphoretic.   HENT:      Head: Normocephalic and atraumatic.      Nose: Nose normal.   Eyes:      General: No scleral icterus.     Extraocular Movements: Extraocular movements intact.      Pupils: Pupils are equal, round, and reactive to light.   Neck:      Vascular: No JVD.   Cardiovascular:      Rate and Rhythm: Normal rate and regular rhythm.      Heart sounds: S1 normal and S2 normal. Heart sounds not distant. No murmur heard.     No systolic murmur is present.      No friction rub. No gallop. No S3 sounds.   Pulmonary:      Effort: Pulmonary effort is normal. No respiratory distress.      Breath sounds: Normal breath sounds. No wheezing or rales.   Abdominal:      General: Bowel sounds are normal. There is no distension.      Palpations: Abdomen is soft.   Musculoskeletal:         General: No deformity.      Cervical back: Normal range of motion and neck supple.      Right lower leg: No edema.      Left lower leg: No edema.   Skin:     General: Skin is warm and dry.      Findings: No erythema.   Neurological:      Mental Status: He is alert and oriented to person, place, and time.      Cranial Nerves: No cranial nerve deficit.   Psychiatric:         Mood and Affect: Mood normal.         Behavior: Behavior normal.         Patient Active Problem List   Diagnosis    Erectile dysfunction    Hyperlipidemia    Asthma    Nicotine dependence    Psoriasis    Urinary frequency    Annual physical exam    Overweight (BMI 25.0-29.9)    Elevated fasting blood sugar    Tobacco abuse    Neck pain    Abnormal CBC    Flu vaccine need    Syncope and collapse    Other chest pain     Past Medical History:   Diagnosis Date    Asthma     Asthma 09/04/2012    Hyperlipidemia     Laceration of right middle finger without foreign body with damage to nail 04/01/2019    Lightheadedness 01/14/2019    Open displaced fracture of distal phalanx of right middle finger 04/01/2019    Urinary frequency      Social  History     Socioeconomic History    Marital status: /Civil Union     Spouse name: Not on file    Number of children: Not on file    Years of education: Not on file    Highest education level: Not on file   Occupational History    Not on file   Tobacco Use    Smoking status: Every Day     Types: Cigarettes    Smokeless tobacco: Never   Vaping Use    Vaping status: Never Used   Substance and Sexual Activity    Alcohol use: Yes     Comment: 2 beers a day    Drug use: Yes     Types: Marijuana     Comment: Occ.    Sexual activity: Yes   Other Topics Concern    Not on file   Social History Narrative    Not on file     Social Determinants of Health     Financial Resource Strain: Not on file   Food Insecurity: Not on file   Transportation Needs: Not on file   Physical Activity: Not on file   Stress: Not on file   Social Connections: Not on file   Intimate Partner Violence: Not on file   Housing Stability: Not on file      Family History   Problem Relation Age of Onset    No Known Problems Father     Mental illness Mother     No Known Problems Maternal Grandmother     No Known Problems Brother      Past Surgical History:   Procedure Laterality Date    HERNIA REPAIR  1994       Current Outpatient Medications:     albuterol (PROVENTIL HFA,VENTOLIN HFA) 90 mcg/act inhaler, TAKE 2 PUFFS BY MOUTH EVERY 6 HOURS AS NEEDED FOR WHEEZE, Disp: 8.5 Inhaler, Rfl: 0    aspirin 81 mg chewable tablet, Chew 81 mg daily, Disp: , Rfl:     Multiple Vitamins-Minerals (MULTIVITAMIN WITH MINERALS) tablet, Take 1 tablet by mouth daily, Disp: , Rfl:     rosuvastatin (CRESTOR) 20 MG tablet, Take 1 tablet (20 mg total) by mouth daily, Disp: 90 tablet, Rfl: 3    clobetasol (TEMOVATE) 0.05 % cream, Apply topically 2 (two) times a day (Patient not taking: Reported on 11/4/2024), Disp: 15 g, Rfl: 0    fluticasone (FLONASE) 50 mcg/act nasal spray, 1 spray into each nostril 2 (two) times a day (Patient not taking: Reported on 11/4/2024), Disp: 11.1  mL, Rfl: 0    oxybutynin (DITROPAN-XL) 10 MG 24 hr tablet, TAKE 1 TABLET BY MOUTH DAILY AT BEDTIME (Patient not taking: Reported on 11/4/2024), Disp: 30 tablet, Rfl: 3  Allergies   Allergen Reactions    Penicillins        Labs:  Hospital Outpatient Visit on 10/07/2024   Component Date Value    Baseline HR 10/07/2024 61     Baseline BP 10/07/2024 140/96     O2 sat rest 10/07/2024 99     Stress peak HR 10/07/2024 133     Post peak BP 10/07/2024 174     O2 sat peak 10/07/2024 98     Recovery HR 10/07/2024 76     Recovery BP 10/07/2024 152/90     O2 sat recovery 10/07/2024 98     Max HR 10/07/2024 133     Max HR Percent 10/07/2024 78     Exercise duration (min) 10/07/2024 10     Exercise duration (sec) 10/07/2024 19     Estimated workload 10/07/2024 12.2     Rate Pressure Product 10/07/2024 23,142.0     Angina Index 10/07/2024 0     Stress Stage Reached 10/07/2024 4.0     Renteria Treadmill Score 10/07/2024 10     ST Depression (mm) 10/07/2024 0.0     Protocol Name 10/07/2024 LISETTE     Exercise duration (min) 10/07/2024 10     Exercise duration (sec) 10/07/2024 19     Post Peak Systolic BP 10/07/2024 174     Max Diastolic Bp 10/07/2024 92     Peak HR 10/07/2024 133     Max Predicted Heart Rate 10/07/2024 169     Reason for Termination 10/07/2024 leg muscle tightness, short of breath     Test Indication 10/07/2024 CHEST PAIN, SYNCOPE     Target Hr Formular 10/07/2024 (220 - Age)*100%     Chest Pain Statement 10/07/2024 none    Hospital Outpatient Visit on 10/07/2024   Component Date Value    BSA 10/07/2024 2.09     LVIDd 10/07/2024 5.00     LVIDS 10/07/2024 2.80     IVSd 10/07/2024 1.00     LVPWd 10/07/2024 1.00     FS 10/07/2024 44     MV E' Tissue Velocity Se* 10/07/2024 10     LA Volume Index (BP) 10/07/2024 14.8     E/A ratio 10/07/2024 0.84     E wave deceleration time 10/07/2024 204     MV Peak E Domingo 10/07/2024 58     MV Peak A Domingo 10/07/2024 0.69     RVID d 10/07/2024 4.0     Tricuspid annular plane * 10/07/2024 2.30      LA size 10/07/2024 3.7     LA length (A2C) 10/07/2024 4.10     LA volume (BP) 10/07/2024 31     RAA A4C 10/07/2024 15.9     MV stenosis pressure 1/2* 10/07/2024 59     MV valve area p 1/2 meth* 10/07/2024 3.73     Ao root 10/07/2024 3.10     Asc Ao 10/07/2024 3.4     Left ventricular stroke * 10/07/2024 92.00     IVS 10/07/2024 1     LEFT VENTRICLE SYSTOLIC * 10/07/2024 29     LV DIASTOLIC VOLUME (MOD* 10/07/2024 121     Left Atrium Area-systoli* 10/07/2024 9.3     Left Atrium Area-systoli* 10/07/2024 16.8     LVSV, 2D 10/07/2024 92     LV EF 10/07/2024 60    Orders Only on 09/14/2024   Component Date Value    Total Cholesterol 09/14/2024 189     HDL 09/14/2024 40     Triglycerides 09/14/2024 120     LDL Calculated 09/14/2024 126 (H)     Chol HDLC Ratio 09/14/2024 4.7     Non-HDL Cholesterol 09/14/2024 149 (H)    Admission on 09/01/2024, Discharged on 09/01/2024   Component Date Value    Ventricular Rate 09/01/2024 79     Atrial Rate 09/01/2024 79     UT Interval 09/01/2024 134     QRSD Interval 09/01/2024 98     QT Interval 09/01/2024 368     QTC Interval 09/01/2024 421     P Axis 09/01/2024 59     QRS Axis 09/01/2024 57     T Wave Axis 09/01/2024 66     WBC 09/01/2024 8.57     RBC 09/01/2024 4.31     Hemoglobin 09/01/2024 14.6     Hematocrit 09/01/2024 42.3     MCV 09/01/2024 98     MCH 09/01/2024 33.9     MCHC 09/01/2024 34.5     RDW 09/01/2024 13.0     MPV 09/01/2024 9.8     Platelets 09/01/2024 255     nRBC 09/01/2024 0     Segmented % 09/01/2024 48     Immature Grans % 09/01/2024 0     Lymphocytes % 09/01/2024 42     Monocytes % 09/01/2024 5     Eosinophils Relative 09/01/2024 4     Basophils Relative 09/01/2024 1     Absolute Neutrophils 09/01/2024 4.10     Absolute Immature Grans 09/01/2024 0.01     Absolute Lymphocytes 09/01/2024 3.61     Absolute Monocytes 09/01/2024 0.43     Eosinophils Absolute 09/01/2024 0.35     Basophils Absolute 09/01/2024 0.07     Sodium 09/01/2024 143     Potassium 09/01/2024  3.6     Chloride 09/01/2024 110 (H)     CO2 09/01/2024 20 (L)     ANION GAP 09/01/2024 13     BUN 09/01/2024 13     Creatinine 09/01/2024 0.84     Glucose 09/01/2024 109     Calcium 09/01/2024 9.5     AST 09/01/2024 15     ALT 09/01/2024 18     Alkaline Phosphatase 09/01/2024 53     Total Protein 09/01/2024 7.1     Albumin 09/01/2024 4.6     Total Bilirubin 09/01/2024 0.32     eGFR 09/01/2024 101     hs TnI 0hr 09/01/2024 <2     hs TnI 2hr 09/01/2024 <2     Delta 2hr hsTnI 09/01/2024       Lab Results   Component Value Date    CHOL 250 (H) 04/26/2017    TRIG 120 09/14/2024    HDL 40 09/14/2024     Imaging: XR chest 1 view portable    Result Date: 9/3/2024  Narrative: XR CHEST PORTABLE INDICATION: cp.   Per my review of the medical record, patient states he was at a party drinking and had sudden chest pain. EMS reports patient had episodes of staring and was unresponsive answering questions. COMPARISON: CXR 9/21/2020 and 1/2/2019. FINDINGS: Clear lungs. No pneumothorax or pleural effusion. Normal cardiomediastinal silhouette. Bones are unremarkable for age. Normal upper abdomen.     Impression: No acute cardiopulmonary disease. Workstation performed: [id number]

## 2024-11-04 NOTE — ASSESSMENT & PLAN NOTE
Seems to have occurred with the episode of syncope.    Has a few risk factors for heart disease including age/man, hyperlipidemia, current smoker.    Stress testing did not reveal any significant concern for ischemic disease   Echocardiogram revealed no significant structural heart abnormalities

## 2024-11-07 ENCOUNTER — OFFICE VISIT (OUTPATIENT)
Dept: FAMILY MEDICINE CLINIC | Facility: CLINIC | Age: 49
End: 2024-11-07
Payer: COMMERCIAL

## 2024-11-07 VITALS
OXYGEN SATURATION: 98 % | BODY MASS INDEX: 27.19 KG/M2 | DIASTOLIC BLOOD PRESSURE: 86 MMHG | TEMPERATURE: 97.6 F | SYSTOLIC BLOOD PRESSURE: 136 MMHG | WEIGHT: 194.2 LBS | HEART RATE: 71 BPM | HEIGHT: 71 IN

## 2024-11-07 DIAGNOSIS — E78.2 MIXED HYPERLIPIDEMIA: ICD-10-CM

## 2024-11-07 DIAGNOSIS — J45.20 MILD INTERMITTENT ASTHMA WITHOUT COMPLICATION: ICD-10-CM

## 2024-11-07 DIAGNOSIS — F17.219 CIGARETTE NICOTINE DEPENDENCE WITH NICOTINE-INDUCED DISORDER: ICD-10-CM

## 2024-11-07 DIAGNOSIS — Z00.00 ANNUAL PHYSICAL EXAM: Primary | ICD-10-CM

## 2024-11-07 PROCEDURE — 99214 OFFICE O/P EST MOD 30 MIN: CPT | Performed by: FAMILY MEDICINE

## 2024-11-07 PROCEDURE — 99396 PREV VISIT EST AGE 40-64: CPT | Performed by: FAMILY MEDICINE

## 2024-11-07 RX ORDER — VARENICLINE TARTRATE 0.5 (11)-1
KIT ORAL
Qty: 53 EACH | Refills: 0 | Status: SHIPPED | OUTPATIENT
Start: 2024-11-07

## 2024-11-07 NOTE — PATIENT INSTRUCTIONS
"Patient Education     Routine physical for adults   The Basics   Written by the doctors and editors at [facility]   What is a physical? -- A physical is a routine visit, or \"check-up,\" with your doctor. You might also hear it called a \"wellness visit\" or \"preventive visit.\"  During each visit, the doctor will:   Ask about your physical and mental health   Ask about your habits, behaviors, and lifestyle   Do an exam   Give you vaccines if needed   Talk to you about any medicines you take   Give advice about your health   Answer your questions  Getting regular check-ups is an important part of taking care of your health. It can help your doctor find and treat any problems you have. But it's also important for preventing health problems.  A routine physical is different from a \"sick visit.\" A sick visit is when you see a doctor because of a health concern or problem. Since physicals are scheduled ahead of time, you can think about what you want to ask the doctor.  How often should I get a physical? -- It depends on your age and health. In general, for people age 21 years and older:   If you are younger than 50 years, you might be able to get a physical every 3 years.   If you are 50 years or older, your doctor might recommend a physical every year.  If you have an ongoing health condition, like diabetes or high blood pressure, your doctor will probably want to see you more often.  What happens during a physical? -- In general, each visit will include:   Physical exam - The doctor or nurse will check your height, weight, heart rate, and blood pressure. They will also look at your eyes and ears. They will ask about how you are feeling and whether you have any symptoms that bother you.   Medicines - It's a good idea to bring a list of all the medicines you take to each doctor visit. Your doctor will talk to you about your medicines and answer any questions. Tell them if you are having any side effects that bother you. You " "should also tell them if you are having trouble paying for any of your medicines.   Habits and behaviors - This includes:   Your diet   Your exercise habits   Whether you smoke, drink alcohol, or use drugs   Whether you are sexually active   Whether you feel safe at home  Your doctor will talk to you about things you can do to improve your health and lower your risk of health problems. They will also offer help and support. For example, if you want to quit smoking, they can give you advice and might prescribe medicines. If you want to improve your diet or get more physical activity, they can help you with this, too.   Lab tests, if needed - The tests you get will depend on your age and situation. For example, your doctor might want to check your:   Cholesterol   Blood sugar   Iron level   Vaccines - The recommended vaccines will depend on your age, health, and what vaccines you already had. Vaccines are very important because they can prevent certain serious or deadly infections.   Discussion of screening - \"Screening\" means checking for diseases or other health problems before they cause symptoms. Your doctor can recommend screening based on your age, risk, and preferences. This might include tests to check for:   Cancer, such as breast, prostate, cervical, ovarian, colorectal, prostate, lung, or skin cancer   Sexually transmitted infections, such as chlamydia and gonorrhea   Mental health conditions like depression and anxiety  Your doctor will talk to you about the different types of screening tests. They can help you decide which screenings to have. They can also explain what the results might mean.   Answering questions - The physical is a good time to ask the doctor or nurse questions about your health. If needed, they can refer you to other doctors or specialists, too.  Adults older than 65 years often need other care, too. As you get older, your doctor will talk to you about:   How to prevent falling at " home   Hearing or vision tests   Memory testing   How to take your medicines safely   Making sure that you have the help and support you need at home  All topics are updated as new evidence becomes available and our peer review process is complete.  This topic retrieved from [employer] on: May 02, 2024.  Topic 641664 Version 1.0  Release: 32.4.3 - C32.122  © 2024 UpToDate, Inc. and/or its affiliates. All rights reserved.  Consumer Information Use and Disclaimer   Disclaimer: This generalized information is a limited summary of diagnosis, treatment, and/or medication information. It is not meant to be comprehensive and should be used as a tool to help the user understand and/or assess potential diagnostic and treatment options. It does NOT include all information about conditions, treatments, medications, side effects, or risks that may apply to a specific patient. It is not intended to be medical advice or a substitute for the medical advice, diagnosis, or treatment of a health care provider based on the health care provider's examination and assessment of a patient's specific and unique circumstances. Patients must speak with a health care provider for complete information about their health, medical questions, and treatment options, including any risks or benefits regarding use of medications. This information does not endorse any treatments or medications as safe, effective, or approved for treating a specific patient. UpToDate, Inc. and its affiliates disclaim any warranty or liability relating to this information or the use thereof.The use of this information is governed by the Terms of Use, available at https://www.woltersPhonitive - Touchalizeuwer.com/en/know/clinical-effectiveness-terms. 2024© UpToDate, Inc. and its affiliates and/or licensors. All rights reserved.  Copyright   © 2024 UpToDate, Inc. and/or its affiliates. All rights reserved.

## 2024-11-07 NOTE — PROGRESS NOTES
Adult Annual Physical  Name: Balta Damico      : 1975      MRN: [id number]  Encounter Provider: Lana Kendall DO  Encounter Date: 2024   Encounter department: [facility]    Assessment & Plan  Annual physical exam         Cigarette nicotine dependence with nicotine-induced disorder  Chantix therapy initiated  Orders:    Varenicline Tartrate, Starter, (Chantix Starting Month ) 0.5 MG X 11 & 1 MG X 42 TBPK; 0.5 mg once daily for 3 days then 0.5mg twice daily for days 4-7 then 1 mg twice daily    Mixed hyperlipidemia  Continue Crestor therapy.       Mild intermittent asthma without complication  Continue with current inhaler therapy.       Immunizations and preventive care screenings were discussed with patient today. Appropriate education was printed on patient's after visit summary.    Discussed risks and benefits of prostate cancer screening. We discussed the controversial history of PSA screening for prostate cancer in the [place] as well as the risk of over detection and over treatment of prostate cancer by way of PSA screening.  The patient understands that PSA blood testing is an imperfect way to screen for prostate cancer and that elevated PSA levels in the blood may also be caused by infection, inflammation, prostatic trauma or manipulation, urological procedures, or by benign prostatic enlargement.    The role of the digital rectal examination in prostate cancer screening was also discussed and I discussed with him that there is large interobserver variability in the findings of digital rectal examination.    Counseling:  Exercise: the importance of regular exercise/physical activity was discussed. Recommend exercise 3-5 times per week for at least 30 minutes.          History of Present Illness     Adult Annual Physical:  Patient presents for annual physical.     Diet and Physical Activity:  - Diet/Nutrition: well balanced diet.  - Exercise:  "walking.    Depression Screening:  - PHQ-2 Score: 0    General Health:  - Sleep: sleeps well.  - Hearing: normal hearing bilateral ears.  - Vision: no vision problems.  - Dental: regular dental visits.     Health:  - History of STDs: no.   - Urinary symptoms: none.     Advanced Care Planning:  - Has an advanced directive?: no    - Has a durable medical POA?: no    - ACP document given to patient?: yes      Review of Systems   Constitutional:  Negative for appetite change, chills and fever.   HENT:  Negative for ear pain, facial swelling, rhinorrhea, sinus pain, sore throat and trouble swallowing.    Eyes:  Negative for discharge and redness.   Respiratory:  Negative for chest tightness, shortness of breath and wheezing.    Cardiovascular:  Negative for chest pain and palpitations.   Gastrointestinal:  Negative for abdominal pain, diarrhea, nausea and vomiting.   Endocrine: Negative for polyuria.   Genitourinary:  Negative for dysuria and urgency.   Musculoskeletal:  Negative for arthralgias and back pain.   Skin:  Negative for rash.   Neurological:  Negative for dizziness, weakness and headaches.   Hematological:  Negative for adenopathy.   Psychiatric/Behavioral:  Negative for behavioral problems, confusion and sleep disturbance.    All other systems reviewed and are negative.    Pertinent Medical History   Hyperlipidemia, Asthma and tobacco abuse.         Objective     /86   Pulse 71   Temp 97.6 °F (36.4 °C)   Ht 5' 11\" (1.803 m)   Wt 88.1 kg (194 lb 3.2 oz)   SpO2 98%   BMI 27.09 kg/m²     Physical Exam  Vitals and nursing note reviewed.   Constitutional:       General: He is not in acute distress.     Appearance: Normal appearance. He is well-developed. He is not ill-appearing or diaphoretic.   HENT:      Head: Normocephalic and atraumatic.      Right Ear: Tympanic membrane, ear canal and external ear normal.      Left Ear: Tympanic membrane, ear canal and external ear normal.      Nose: Nose " normal. No congestion or rhinorrhea.      Mouth/Throat:      Mouth: Mucous membranes are moist.      Pharynx: Oropharynx is clear. No oropharyngeal exudate or posterior oropharyngeal erythema.   Eyes:      General: No scleral icterus.        Right eye: No discharge.         Left eye: No discharge.      Extraocular Movements: Extraocular movements intact.      Conjunctiva/sclera: Conjunctivae normal.      Pupils: Pupils are equal, round, and reactive to light.   Neck:      Thyroid: No thyromegaly.      Vascular: No carotid bruit or JVD.      Trachea: No tracheal deviation.   Cardiovascular:      Rate and Rhythm: Normal rate and regular rhythm.      Pulses: Normal pulses.      Heart sounds: Normal heart sounds. No murmur heard.  Pulmonary:      Effort: Pulmonary effort is normal. No respiratory distress.      Breath sounds: Normal breath sounds. No stridor. No wheezing, rhonchi or rales.   Abdominal:      General: Abdomen is flat. Bowel sounds are normal. There is no distension.      Palpations: Abdomen is soft. There is no mass.      Tenderness: There is no abdominal tenderness. There is no guarding or rebound.   Musculoskeletal:         General: No swelling, tenderness or deformity. Normal range of motion.      Cervical back: Normal range of motion and neck supple. No rigidity.      Right lower leg: No edema.      Left lower leg: No edema.   Lymphadenopathy:      Cervical: No cervical adenopathy.   Skin:     General: Skin is warm and dry.      Capillary Refill: Capillary refill takes less than 2 seconds.      Coloration: Skin is not jaundiced.      Findings: No bruising, erythema or rash.   Neurological:      General: No focal deficit present.      Mental Status: He is alert and oriented to person, place, and time.      Cranial Nerves: No cranial nerve deficit.      Sensory: No sensory deficit.      Motor: No abnormal muscle tone.      Coordination: Coordination normal.      Gait: Gait normal.      Deep Tendon  Reflexes: Reflexes are normal and symmetric. Reflexes normal.   Psychiatric:         Mood and Affect: Mood normal.         Behavior: Behavior normal.         Thought Content: Thought content normal.         Judgment: Judgment normal.       Administrative Statements   I have spent a total time of 20 minutes in caring for this patient on the day of the visit/encounter including Diagnostic results, Prognosis, Risks and benefits of tx options, Instructions for management, and Impressions. Topics discussed with the patient / family include symptom assessment and management, medication review, medication adjustment, and psychosocial support.

## 2024-11-12 ENCOUNTER — EVENT RECORDER/EXTENDED HOLTER (OUTPATIENT)
Dept: CARDIOLOGY CLINIC | Facility: MEDICAL CENTER | Age: 49
End: 2024-11-12
Payer: COMMERCIAL

## 2024-11-12 ENCOUNTER — TELEPHONE (OUTPATIENT)
Dept: CARDIOLOGY CLINIC | Facility: CLINIC | Age: 49
End: 2024-11-12

## 2024-11-12 DIAGNOSIS — R55 SYNCOPE AND COLLAPSE: ICD-10-CM

## 2024-11-12 PROCEDURE — 93248 EXT ECG>7D<15D REV&INTERPJ: CPT | Performed by: INTERNAL MEDICINE

## 2024-11-12 NOTE — TELEPHONE ENCOUNTER
CHETNAM to call office back for Holter monitor results per Dr. Soliman     Please relay message to patient when he calls back - Please call the patient regarding his zio result.  Please let him know that there was one episode of a fast heart rhythm that occurred only for 9 beats, in the middle of the night.  I'm looking forward to seeing the results of his sleep study.

## 2024-11-13 ENCOUNTER — HOSPITAL ENCOUNTER (OUTPATIENT)
Dept: CT IMAGING | Facility: HOSPITAL | Age: 49
Discharge: HOME | End: 2024-11-13
Attending: INTERNAL MEDICINE
Payer: COMMERCIAL

## 2024-11-13 VITALS — SYSTOLIC BLOOD PRESSURE: 110 MMHG | HEART RATE: 62 BPM | DIASTOLIC BLOOD PRESSURE: 63 MMHG

## 2024-11-13 DIAGNOSIS — R55 SYNCOPE AND COLLAPSE: ICD-10-CM

## 2024-11-13 PROCEDURE — 75580 N-INVAS EST C FFR SW ALY CTA: CPT

## 2024-11-13 PROCEDURE — 75574 CT ANGIO HRT W/3D IMAGE: CPT

## 2024-11-13 RX ORDER — NITROGLYCERIN 0.4 MG/1
0.8 TABLET SUBLINGUAL ONCE
Status: COMPLETED | OUTPATIENT
Start: 2024-11-13 | End: 2024-11-13

## 2024-11-13 RX ORDER — METOPROLOL TARTRATE 1 MG/ML
5 INJECTION, SOLUTION INTRAVENOUS
Status: DISCONTINUED | OUTPATIENT
Start: 2024-11-13 | End: 2024-11-17 | Stop reason: HOSPADM

## 2024-11-13 RX ADMIN — NITROGLYCERIN 0.8 MG: 0.4 TABLET SUBLINGUAL at 14:58

## 2024-11-13 RX ADMIN — IOHEXOL 94 ML: 350 INJECTION, SOLUTION INTRAVENOUS at 15:07

## 2024-11-13 NOTE — PROGRESS NOTES
Patient arrived for coronary CT angiography. Test education reviewed with patient. Medications and allergies verified. Pt denies PDE5 inhibitor use. Patient took lopressor as instructed by cardiology. SL nitro given per protocol. See vitals flowsheet. Tolerated test well. Instructed to increase fluid intake remainder of day. Vitals stable, patient asymptomatic upon departure with spouse.

## 2024-11-14 ENCOUNTER — RESULTS FOLLOW-UP (OUTPATIENT)
Dept: CARDIOLOGY CLINIC | Facility: CLINIC | Age: 49
End: 2024-11-14

## 2024-11-14 NOTE — TELEPHONE ENCOUNTER
LVM to call back for CTA results    Please relay message if patient calls back per Dr. Soliman -   Please call the patient regarding his CTA coronary result.  Please let him know that there were no significant blockages seen on the study.  There is mild plaque, but nothing that is restrictive blood flow to his heart that needs a stent or bypass surgery to fix.

## 2024-11-18 NOTE — ASSESSMENT & PLAN NOTE
Chantix therapy initiated  Orders:    Varenicline Tartrate, Starter, (Chantix Starting Month Kaden) 0.5 MG X 11 & 1 MG X 42 TBPK; 0.5 mg once daily for 3 days then 0.5mg twice daily for days 4-7 then 1 mg twice daily

## 2024-11-29 ENCOUNTER — PATIENT OUTREACH (OUTPATIENT)
Dept: HEMATOLOGY ONCOLOGY | Facility: CLINIC | Age: 49
End: 2024-11-29

## 2024-11-29 ENCOUNTER — DOCUMENTATION (OUTPATIENT)
Dept: HEMATOLOGY ONCOLOGY | Facility: CLINIC | Age: 49
End: 2024-11-29

## 2024-11-29 NOTE — PROGRESS NOTES
Initial outreach. Spoke to patient and his spouse, Jeannie. Introduced myself and explained the role of an Oncology Nurse Navigator to assist with coordination of cancer care, preparation for upcoming appointment, be a point of contact prior to oncology consult, provide support and connect him with available resources. Discussed surgical oncology referral placed by Dr Deshpande of [facility] for melanoma. Explained I will be their main contact until they are established with their team and a treatment plan is established.     Patient states he returned to Dr Deshpande's office 11/27/24, stitches removed from 11/13/24 biopsy site on back. While there an additional biopsy was preformed by Dr Deshpande for another small skin lesion approximately 2 inches away from prior biopsy area. Results are pending.     Introduced Mehreen, and explained she will be his patient navigator, who will reach out after the first consult to introduce themselves. The PN will provide support, make sure he has a good understanding of the plan and schedule and to connect with additional resources if/when needed.     Reviewed office locations.  Assessed for barriers of care. My direct contact information provided. All questions answered at this time. Patient and spouse are aware that they can call me should they need any further assistance. Patient was appreciative of assistance.     Cutaneous Symptoms:   Wounds Yes, describe: 11/27/24 biopsy site on back - applying Vaseline daily  Fatigue yes, after work    Prior history of skin cancer no    Dermatology Provider: [facility]  PCP: Lana Kendall, DO  Insurance:  [employer]

## 2024-11-29 NOTE — PROGRESS NOTES
PN please retrieve outside records.     Referral received/ Chart reviewed for work up completed for referral to surgical oncology    Imaging completed:    [] PET/CT   [] MRI   [] CT   [] US   [] Mammo   [] Bone scan   [x] N/A    Pathology completed:    Date:11/13/2024   Location:[facility], Accession # [id number]   []N/A    Additional records needed:   [] Genomic report   [] Genetic testing results   [x] Office Note - 11/13/24 [facility], Dr Nico Deshpande   [] Procedure/ Operative note   [] Lab results   [] N/A      [] Radiation Oncology records retrieval needed (PN to route to rad/onc clerical pool once scheduled)  Date:  Location:

## 2024-12-02 ENCOUNTER — TELEPHONE (OUTPATIENT)
Age: 49
End: 2024-12-02

## 2024-12-02 ENCOUNTER — PATIENT OUTREACH (OUTPATIENT)
Dept: HEMATOLOGY ONCOLOGY | Facility: CLINIC | Age: 49
End: 2024-12-02

## 2024-12-02 NOTE — TELEPHONE ENCOUNTER
I called Balta in response to a referral that was received for patient to establish care with Surgical Oncology    Outreach was made to schedule a consultation.    A consultation was scheduled for patient during this call. Patient is scheduled on 12/13/24 at 9:30 AM with Dr Chin at the [facility]    Other scheduling guidelines or patient instructions: If no availability at [place], patient is willing to travel to [place]     Pt is scheduled for first available appt at either location. NN review requested to be scheduled in 7 days. Please advise if this appt can be moved sooner to meet scheduling recommendations.

## 2024-12-02 NOTE — PROGRESS NOTES
Intake received, chart reviewed for need of external records.  Consulting: Dr. JUNIOR  Scheduled on 12/13/2024  Dx: MELANOMA   ICD: C43.59    Pathology slides requested:   From [employer]  phone [phone], via fax [phone]  Accession # [id number]  Slides will be sent directly to [facility] lab at [address] [place] [address].

## 2024-12-04 ENCOUNTER — LAB REQUISITION (OUTPATIENT)
Dept: LAB | Facility: HOSPITAL | Age: 49
End: 2024-12-04
Payer: COMMERCIAL

## 2024-12-04 DIAGNOSIS — C43.59 MALIGNANT MELANOMA OF OTHER PART OF TRUNK (HCC): ICD-10-CM

## 2024-12-05 PROCEDURE — 88321 CONSLTJ&REPRT SLD PREP ELSWR: CPT | Performed by: STUDENT IN AN ORGANIZED HEALTH CARE EDUCATION/TRAINING PROGRAM

## 2024-12-09 ENCOUNTER — DOCUMENTATION (OUTPATIENT)
Dept: HEMATOLOGY ONCOLOGY | Facility: CLINIC | Age: 49
End: 2024-12-09

## 2024-12-12 PROBLEM — C43.59 MALIGNANT MELANOMA OF TORSO EXCLUDING BREAST (HCC): Status: ACTIVE | Noted: 2024-12-12

## 2024-12-13 ENCOUNTER — CONSULT (OUTPATIENT)
Dept: SURGICAL ONCOLOGY | Facility: CLINIC | Age: 49
End: 2024-12-13
Payer: COMMERCIAL

## 2024-12-13 VITALS
HEART RATE: 66 BPM | OXYGEN SATURATION: 97 % | RESPIRATION RATE: 16 BRPM | SYSTOLIC BLOOD PRESSURE: 124 MMHG | HEIGHT: 71 IN | DIASTOLIC BLOOD PRESSURE: 84 MMHG | BODY MASS INDEX: 27.58 KG/M2 | WEIGHT: 197 LBS | TEMPERATURE: 97.5 F

## 2024-12-13 DIAGNOSIS — C43.59 MALIGNANT MELANOMA OF TORSO EXCLUDING BREAST (HCC): Primary | ICD-10-CM

## 2024-12-13 PROCEDURE — 99204 OFFICE O/P NEW MOD 45 MIN: CPT | Performed by: SURGERY

## 2024-12-13 NOTE — PROGRESS NOTES
Surgical Oncology Consult       [facility] [place]  [address]  [place] [address]    Balta Damico  1975  [id number]  [facility] [place]  [address]  [place] [address]    1. Malignant melanoma of torso excluding breast (HCC)  Assessment & Plan:  49-year-old male with a recently diagnosed clinical [id number] melanoma.  He gives no signs or symptoms of metastasis.  I suspect the lightheadedness is not related to melanoma since it is intermittent.  I would recommend that he undergo wide excision.  We also discussed the reasoning and rationale behind sentinel lymph node biopsy.  The risk of having a positive sentinel node is under 3%, consequently wide excision alone would be reasonable.  The risks of wide excision of the back melanoma were explained and included bleeding, infection, recurrence, need for further surgery and wound complications.  Informed consent was obtained.  We will schedule this at our earliest mutual convenience.  He is agreeable with this plan.  All his questions were answered.      Chief Complaint   Patient presents with    Consult       Return for Procedure.    Oncology History   Malignant melanoma of torso excluding breast (HCC)   11/13/2024 Biopsy    Skin, mid upper back:     MELANOMA (thickness: 0.7 mm); melanoma in situ extends to peripheral specimen.     Synoptic report for melanoma of the skin  Thickness: 0.7 mm  Ulceration: not seen  Mitoses: 0/mm2  Margin assessment: melanoma in situ extends to peripheral specimen margin  Pathologic stage: pT1a       12/13/2024 -  Cancer Staged    Staging form: Melanoma of the Skin, AJCC 8th Edition  - Clinical: Stage IA (cT1a, cN0, cM0) - Signed by Maikel Chin MD on 12/13/2024           History of Present Illness: 49-year-old male who had a mole on his back.  His wife noticed that this was changing  after he was complaining of pain at the site.  He quickly brought this to the attention of his primary care physician and dermatologist.  An excision was performed.  This revealed a 0.7 mm melanoma.  There was no ulceration.  No mitoses.  Melanoma in situ is at the margin.  He comes in now to discuss further therapy.  He denies any new abdominal pain, nausea, vomiting, back pain, bone pain, headaches, or cough.  He does have some occasional lightheadedness that last for couple seconds.  This may occur once or twice a week and then not again for another few weeks.  He is also undergoing a cardiology workup for this.  No family history of melanoma that he knows of.  He did have 1 blistering sunburn as a child.    Review of Systems  Complete ROS Surg Onc:   Constitutional: The patient denies new or recent history of general fatigue, no recent weight loss, no change in appetite.   Eyes: No complaints of visual problems, no scleral icterus.   ENT: no complaints of ear pain, no hoarseness, no difficulty swallowing,  no tinnitus and no new masses in head, oral cavity, or neck.   Cardiovascular: No complaints of chest pain, no palpitations, no ankle edema.   Respiratory: No complaints of shortness of breath, no cough.   Gastrointestinal: No complaints of jaundice, no bloody stools, no pale stools.   Genitourinary: No complaints of dysuria, no hematuria, no nocturia, no frequent urination, no urethral discharge.   Musculoskeletal: No complaints of weakness, paralysis, joint stiffness or arthralgias.  Integumentary: No complaints of rash, no new lesions.   Neurological: No complaints of convulsions, no seizures, no dizziness.   Hematologic/Lymphatic: No complaints of easy bruising.   Endocrine:  No hot or cold intolerance.  No polydipsia, polyphagia, or polyuria.  Allergy/immunology:  No environmental allergies.  No food allergies.  Not immunocompromised.  Skin:  No pallor or rash.  No wound.          Patient Active Problem  List   Diagnosis    Erectile dysfunction    Hyperlipidemia    Asthma    Nicotine dependence    Psoriasis    Urinary frequency    Annual physical exam    Overweight (BMI 25.0-29.9)    Elevated fasting blood sugar    Tobacco abuse    Neck pain    Abnormal CBC    Flu vaccine need    Syncope and collapse    Other chest pain    Malignant melanoma of torso excluding breast (HCC)     Past Medical History:   Diagnosis Date    Asthma     Asthma 09/04/2012    Hyperlipidemia     Laceration of right middle finger without foreign body with damage to nail 04/01/2019    Lightheadedness 01/14/2019    Open displaced fracture of distal phalanx of right middle finger 04/01/2019    Urinary frequency      Past Surgical History:   Procedure Laterality Date    HERNIA REPAIR  1994     Family History   Problem Relation Age of Onset    Mental illness Mother     Heart disease Mother     Multiple sclerosis Mother     No Known Problems Father     No Known Problems Brother     No Known Problems Maternal Grandmother      Social History     Socioeconomic History    Marital status: /Civil Union     Spouse name: Not on file    Number of children: Not on file    Years of education: Not on file    Highest education level: Not on file   Occupational History    Not on file   Tobacco Use    Smoking status: Every Day     Current packs/day: 1.00     Average packs/day: 1 pack/day for 36.6 years (36.6 ttl pk-yrs)     Types: Cigarettes     Start date: 5/10/1988     Passive exposure: Past    Smokeless tobacco: Never   Vaping Use    Vaping status: Never Used   Substance and Sexual Activity    Alcohol use: Yes     Alcohol/week: 24.0 standard drinks of alcohol     Types: 24 Cans of beer per week     Comment: Drink mostly on weekends    Drug use: Yes     Frequency: 1.0 times per week     Types: Marijuana     Comment: Once a weekend    Sexual activity: Yes     Partners: Female     Birth control/protection: None   Other Topics Concern    Not on file   Social  History Narrative    Not on file     Social Drivers of Health     Financial Resource Strain: Not on file   Food Insecurity: Not on file   Transportation Needs: Not on file   Physical Activity: Not on file   Stress: Not on file   Social Connections: Not on file   Intimate Partner Violence: Not on file   Housing Stability: Not on file       Current Outpatient Medications:     albuterol (PROVENTIL HFA,VENTOLIN HFA) 90 mcg/act inhaler, TAKE 2 PUFFS BY MOUTH EVERY 6 HOURS AS NEEDED FOR WHEEZE, Disp: 8.5 Inhaler, Rfl: 0    aspirin 81 mg chewable tablet, Chew 81 mg daily, Disp: , Rfl:     Multiple Vitamins-Minerals (MULTIVITAMIN WITH MINERALS) tablet, Take 1 tablet by mouth daily, Disp: , Rfl:     rosuvastatin (CRESTOR) 20 MG tablet, Take 1 tablet (20 mg total) by mouth daily, Disp: 90 tablet, Rfl: 3    Varenicline Tartrate, Starter, (Chantix Starting Month Pak) 0.5 MG X 11 & 1 MG X 42 TBPK, 0.5 mg once daily for 3 days then 0.5mg twice daily for days 4-7 then 1 mg twice daily, Disp: 53 each, Rfl: 0  Allergies   Allergen Reactions    Penicillins      Vitals:    12/13/24 0903   BP: 124/84   Pulse: 66   Resp: 16   Temp: 97.5 °F (36.4 °C)   SpO2: 97%       Physical Exam   Constitutional: General appearance: The Patient is well-developed and well-nourished who appears the stated age in no acute distress. Patient is pleasant and talkative.     HEENT:  Normocephalic.  Sclerae are anicteric. Mucous membranes are moist. Neck is supple without adenopathy. No JVD.     Chest: The lungs are clear to auscultation.     Cardiac: Heart is regular rate.     Abdomen: Abdomen is soft, non-tender, non-distended and without masses.     Extremities: There is no clubbing or cyanosis. There is no edema.  Symmetric.  Neuro: Grossly nonfocal. Gait is normal.     Lymphatic: No evidence of cervical adenopathy bilaterally.   No evidence of axillary adenopathy bilaterally. No evidence of inguinal adenopathy bilaterally.     Skin: Warm, anicteric.   Excision site on the back is healed well.  No evidence of local recurrence or satellite lesions.    Psych:  Patient is pleasant and talkative.  Breasts:      Pathology:  Final Diagnosis   Consult Case (13 slides [id number] [employer], collected 11/13/2024):     A. Skin, mid upper back:     MELANOMA (thickness: 0.7 mm); melanoma in situ extends to peripheral specimen (see note).     Note: The provided Melan-A immunostains were reviewed. Please see synoptic report for additional details.     Synoptic report for melanoma of the skin  Thickness: 0.7 mm  Ulceration: not seen  Anatomic (Allen) level: IV  Type: superficial spreading melanoma  Mitoses: 0/mm2  Microsatellites: not seen  Lymphovascular invasion: not seen  Neurotropism: not seen  Tumor regression: present  Tumor-infiltrating lymphocytes (TIL): present, brisk   Margin assessment: melanoma in situ extends to peripheral specimen margin  Pathologic stage: pT1a  Associated nevus: not seen      Electronically signed by Varinder Baker MD on 12/5/2024 at 1733 EST       Labs:      Imaging  CTA CORONARY ARTERY SCAN w FFR if needed  Result Date: 11/14/2024  Narrative: CORONARY CT ANGIOGRAM WITH FFRCT ANALYSIS - WITHOUT AND WITH IV CONTRAST INDICATION: R55: Syncope and collapse. Patient Age: 49 years Patient Gender: Male. COMPARISON: Normal nuclear medicine stress test in 2021. Normal echo in October 2024. CT ANGIOGRAM TECHNIQUE: Noncontrast CT examination of the heart was performed according to a coronary calcium score protocol. Thin section postcontrast images of the heart were obtained according to gated coronary CT angiographic protocol.  2D and 3D image reconstruction was performed on an independent workstation in order to perform coronary vessel analysis. Premedication was administered according to institutional protocol, as detailed in the electronic health record. Prospective ECG triggering was  used. This examination, like all CT scans performed in  the [facility], was performed utilizing techniques to minimize radiation dose exposure, including the use of iterative reconstruction and automated exposure control. Radiation dose length product (DLP) for this visit: 224.41 mGy-cm . SCANNER/LOCATION: [facility]/Outpatient IV CONTRAST: 94 mL of iohexol (OMNIPAQUE) IMAGE QUALITY: Excellent image quality with no significant artifact present. FINDINGS: CORONARY CALCIUM SCORE: 35 Calcium score PERCENTILE of age, race, and gender matched database participants in the Multi-Ethnic Study of Atherosclerosis (GILLESPIE) trial: CORONARY ORIGINS AND COURSE: Normal. DOMINANCE:  Right coronary artery dominance. CORONARY ARTERY ASSESSMENT: LEFT MAIN: No stenosis. LAD: Adventitial calcific plaque in the proximal left anterior descending coronary artery. No stenosis. LCX: No stenosis. RCA: Mild stenosis (25 - 49% narrowing). Specifically, focal predominantly noncalcified atherosclerotic plaque in the proximal right coronary artery resulting in 25 to 49% narrowing best demonstrated on reconstructed image 1 of series 406. Adventitial calcific atherosclerotic plaque in the mid right coronary artery. CARDIAC STRUCTURES: Valves: Normal CT appearance of cardiac valves. Pericardium: Normal pericardial contour without pericardial effusion, thickening, or calcifications. Visualized Aorta: No aortic aneurysm or dissection. Left atrial appendage: No intraluminal thrombus seen. EXTRACARDIAC FINDINGS: No significant findings identified on limited small field of view low radiation dose noncontrast images of the chest at the level of the heart. FFRCT INDICATION/TECHNIQUE: Because initial review of the CTA data revealed findings suggesting a potentially flow limiting stenosis CT Fractional Flow Reserve analysis was requested. CT data images prepared and securely transmitted to [email or web address]. FFRCT values were derived from image-based modeling from the CCTA data set after applying computational  fluid dynamic principles simulating adenosine induced maximal coronary hyperemia. Diagrammatic representation of the FFRCT analysis is returned and provided  in a separate PDF document in PACS. Normal FFR range is >0.80. Gray zone values are 0.76-0.80. FFRCT ASSESSMENT: LEFT MAIN: Normal FFRCT analysis with no evidence for hemodynamically significant lesion. LAD: Normal FFRCT analysis with no evidence for hemodynamically significant lesion. LCX: Normal FFRCT analysis with no evidence for hemodynamically significant lesion. RCA: Normal FFRCT analysis with no evidence for hemodynamically significant lesion. Specifically, normal FFR CT values beyond mild atherosclerotic stenosis in the proximal right coronary artery. CAD-RADS 2.0 ANALYSIS: CAD-RADS 2 - Mild nonobstructive coronary artery disease (maximal coronary stenosis = 25 - 49%). P1 - Mild amount of plaque I- - Negative for lesion specific ischemia     Impression: No flow-limiting coronary artery stenosis. While there is a noncalcified atherosclerotic lesion in the proximal right coronary artery which results in mild estimated 29 to 49% atherosclerotic stenosis, by FFR CT analysis there is no evidence for hemodynamically significant coronary artery narrowing. Management recommendations: - Consider nonatherosclerotic causes of symptoms. - Risk factor modification and preventative pharmacotherapy. Workstation performed: [id number]     I personally reviewed and interpreted the above laboratory and imaging data.

## 2024-12-13 NOTE — ASSESSMENT & PLAN NOTE
49-year-old male with a recently diagnosed clinical [id number] melanoma.  He gives no signs or symptoms of metastasis.  I suspect the lightheadedness is not related to melanoma since it is intermittent.  I would recommend that he undergo wide excision.  We also discussed the reasoning and rationale behind sentinel lymph node biopsy.  The risk of having a positive sentinel node is under 3%, consequently wide excision alone would be reasonable.  The risks of wide excision of the back melanoma were explained and included bleeding, infection, recurrence, need for further surgery and wound complications.  Informed consent was obtained.  We will schedule this at our earliest mutual convenience.  He is agreeable with this plan.  All his questions were answered.

## 2024-12-24 ENCOUNTER — TELEPHONE (OUTPATIENT)
Age: 49
End: 2024-12-24

## 2024-12-24 NOTE — TELEPHONE ENCOUNTER
Spoke with PT regarding appt cancelled by office today due to it being tied to injuries at work. PT was told by the ER to f/u with his PCP to see when he could go back to work. I contacted [place] in the office, who told me that the office does not participate with workman's comp claims and PT would have to check with his HR where he should go. PT stated that the HR dept told him to also go to  his PCP. He said he will call the [facility] to see if they will do an appt or if they have any ideas where he should go.

## 2024-12-30 ENCOUNTER — PATIENT OUTREACH (OUTPATIENT)
Dept: HEMATOLOGY ONCOLOGY | Facility: CLINIC | Age: 49
End: 2024-12-30

## 2024-12-30 NOTE — PROGRESS NOTES
I reached out to [facility] to complete the Distress Thermometer, review for any barriers to care and to offer any supportive services that may be needed. I left  with the reason for my call including my direct phone number [phone].

## 2024-12-31 ENCOUNTER — PATIENT OUTREACH (OUTPATIENT)
Dept: HEMATOLOGY ONCOLOGY | Facility: CLINIC | Age: 49
End: 2024-12-31

## 2024-12-31 NOTE — PROGRESS NOTES
I reached out and spoke with Balta now that consults have been completed with the oncology teams to review for any barriers to care and offer supportive services as needed. Distress Thermometer completed at this time. Patient scored 0/10. Based on responses to DT, no indication for referral to SW needed at this time. . I reviewed and updated the members assigned to the care team in [facility].   He knows the members of the care team as well as how and when to contact them with any needs.   He verbalizes managing the schedules well.   He is currently able to drive and denies any transportation needs.    He denies any uncontrolled symptoms. Discussed role of Palliative Care in symptom and side effect management. Declined referral at this time.  He states that he is eating and drinking as per usual with no unintentional weight loss.     Patient does not smoke.   He states he is well supported by family and friends.  Community support groups discussed including the Cancer Support Community of the [facility]. Patient declined information at this time.   He feels he has adequate insurance coverage and denies any financial concerns at this time.     Based on individual needs I will follow up as needed. I have provided my direct contact information and welcome them to contact me if needs as discussed above change. He was appreciative for the call.

## 2025-01-14 ENCOUNTER — TELEPHONE (OUTPATIENT)
Dept: SURGICAL ONCOLOGY | Facility: CLINIC | Age: 50
End: 2025-01-14

## 2025-01-14 NOTE — TELEPHONE ENCOUNTER
Called patient and left message with the [employer] number to call back so that we can get him rescheduled for his in office procedure as Dr. Chin will be OOO that day. I explained that we only do the in office procedures at the [place] location and that I do not have anything available until 02/13/25. I gave him some options of times that were available at 8:15am, 10am and 1:15pm. I asked if he could please call back and confirm if he is able to make any of those times on that day. We will have to override schedule in order to fir him in.

## 2025-01-15 ENCOUNTER — TELEPHONE (OUTPATIENT)
Dept: SURGICAL ONCOLOGY | Facility: CLINIC | Age: 50
End: 2025-01-15

## 2025-01-15 NOTE — TELEPHONE ENCOUNTER
Called patient and left message with the [employer] number to call back so that we can get his in office procedure appointment rescheduled with Dr. Chin as he will be OOO.

## 2025-01-16 ENCOUNTER — TELEPHONE (OUTPATIENT)
Dept: SURGICAL ONCOLOGY | Facility: CLINIC | Age: 50
End: 2025-01-16

## 2025-01-16 ENCOUNTER — OFFICE VISIT (OUTPATIENT)
Dept: CARDIOLOGY CLINIC | Facility: MEDICAL CENTER | Age: 50
End: 2025-01-16
Payer: COMMERCIAL

## 2025-01-16 ENCOUNTER — TELEPHONE (OUTPATIENT)
Age: 50
End: 2025-01-16

## 2025-01-16 VITALS
HEART RATE: 76 BPM | OXYGEN SATURATION: 98 % | SYSTOLIC BLOOD PRESSURE: 136 MMHG | WEIGHT: 206 LBS | DIASTOLIC BLOOD PRESSURE: 82 MMHG | HEIGHT: 71 IN | BODY MASS INDEX: 28.84 KG/M2

## 2025-01-16 DIAGNOSIS — R07.89 OTHER CHEST PAIN: ICD-10-CM

## 2025-01-16 DIAGNOSIS — R55 SYNCOPE AND COLLAPSE: Primary | ICD-10-CM

## 2025-01-16 DIAGNOSIS — E78.2 MIXED HYPERLIPIDEMIA: ICD-10-CM

## 2025-01-16 PROCEDURE — 99214 OFFICE O/P EST MOD 30 MIN: CPT | Performed by: INTERNAL MEDICINE

## 2025-01-16 NOTE — ASSESSMENT & PLAN NOTE
Seems to have occurred with the episode of syncope.    Has a few risk factors for heart disease including age/man, hyperlipidemia, current smoker.    Stress testing did not reveal any significant concern for ischemic disease   Echocardiogram revealed no significant structural heart abnormalities  Coronary CTA revealed no significant obstructive heart disease

## 2025-01-16 NOTE — TELEPHONE ENCOUNTER
Patient calling to speak with Imelda Bhakta, he called to inform her that he is unavailable on 02/13/2025 at 8:15 am, 10 am and 2:30 pm. He would like a callback asap to schedule an appt.

## 2025-01-16 NOTE — TELEPHONE ENCOUNTER
Called both the patient and spouse, left messages on both voice mails. I stated that I was calling in regards to his in office procedure appointment that he has scheduled with Dr. Chin tomorrow. I stated that Dr. Chin will be OOO tomorrow and we need to get him rescheduled. I left the [employer] number for him to call back .

## 2025-01-16 NOTE — ASSESSMENT & PLAN NOTE
Continued on Crestor 20 mg daily.    LDL in chart noted to be 107 in July 2022.    Repeat levels in September 2024 revealed an LDL of 126.  Repeat levels later this year

## 2025-01-16 NOTE — ASSESSMENT & PLAN NOTE
Unclear etiology, however may have been related to dehydration in the setting of alcohol use.    We screened for cardiac etiology of syncope with a stress test that ruled out ischemic etiology.  He had good exercise capacity, with no chest pain during the test and achieving 12.2 METS.  Although he only obtained 78% of max predicted heart rate, with good exercise tolerance without symptoms, as well as no significant ST changes that were developing, this can be used as diagnostic for ruling out ischemic etiology - but with continued symptoms, we continued work-up to have a more definitive answer with coronary CTA.  This was completed in November 2024 revealing no flow-limiting coronary artery stenosis with nonobstructive disease in the right coronary artery the proximal portion of 29 to 49% stenosis  We also ordered a Zio monitor to evaluate for arrhythmias -this revealed symptoms correlating with sinus rhythm and no significant arrhythmias other than a brief 9 beat run of nonsustained ventricular tachycardia and 1 brief run of supraventricular tachycardia lasting 5 beats, with an overall normal burden of ectopy  We also checked an echocardiogram to rule out structural heart disease.  This revealed normal LV size and function with no significant valvular disease  We also referred for sleep study as his wife relays that he has episodes of witnessed apnea at night - which is also pending

## 2025-01-16 NOTE — PROGRESS NOTES
Cardiology Consultation     Balta Damico  [id number]  1975  [facility] [place]  [address]  [place] [address]    Assessment & Plan  Syncope and collapse  Unclear etiology, however may have been related to dehydration in the setting of alcohol use.    We screened for cardiac etiology of syncope with a stress test that ruled out ischemic etiology.  He had good exercise capacity, with no chest pain during the test and achieving 12.2 METS.  Although he only obtained 78% of max predicted heart rate, with good exercise tolerance without symptoms, as well as no significant ST changes that were developing, this can be used as diagnostic for ruling out ischemic etiology - but with continued symptoms, we continued work-up to have a more definitive answer with coronary CTA.  This was completed in November 2024 revealing no flow-limiting coronary artery stenosis with nonobstructive disease in the right coronary artery the proximal portion of 29 to 49% stenosis  We also ordered a Zio monitor to evaluate for arrhythmias -this revealed symptoms correlating with sinus rhythm and no significant arrhythmias other than a brief 9 beat run of nonsustained ventricular tachycardia and 1 brief run of supraventricular tachycardia lasting 5 beats, with an overall normal burden of ectopy  We also checked an echocardiogram to rule out structural heart disease.  This revealed normal LV size and function with no significant valvular disease  We also referred for sleep study as his wife relays that he has episodes of witnessed apnea at night - which is also pending  Other chest pain  Seems to have occurred with the episode of syncope.    Has a few risk factors for heart disease including age/man, hyperlipidemia, current smoker.    Stress testing did not reveal any significant concern for ischemic disease   Echocardiogram revealed no significant structural heart  "abnormalities  Coronary CTA revealed no significant obstructive heart disease  Mixed hyperlipidemia  Continued on Crestor 20 mg daily.    LDL in chart noted to be 107 in July 2022.    Repeat levels in September 2024 revealed an LDL of 126.  Repeat levels later this year    Chief Complaint   Patient presents with    Follow-up     6 week f/up. No complaints.       HPI:Patient feels well, without complaints.  No reported chest pain, shortness of breath, palpitations, lightheadedness, syncope, LE edema, orthopnea, PND, or significant weight changes.  Patient remains active without any increased fatigue out of the ordinary.         Vitals:    01/16/25 0813   BP: 136/82   BP Location: Left arm   Patient Position: Sitting   Cuff Size: Adult   Pulse: 76   SpO2: 98%   Weight: 93.4 kg (206 lb)   Height: 5' 11\" (1.803 m)       EKG:  EKG from emergency room personally reviewed:  Normal sinus rhythm  Normal ECG    Review of Systems:  Review of Systems   Constitutional:  Negative for activity change, appetite change, fatigue and fever.   HENT:  Negative for nosebleeds and sore throat.    Eyes:  Negative for photophobia and visual disturbance.   Respiratory:  Negative for cough, chest tightness, shortness of breath and wheezing.    Cardiovascular:  Negative for chest pain, palpitations and leg swelling.   Gastrointestinal:  Negative for abdominal pain, diarrhea, nausea and vomiting.   Endocrine: Negative for polyuria.   Genitourinary:  Negative for dysuria, frequency and hematuria.   Musculoskeletal:  Negative for arthralgias, back pain and gait problem.   Skin:  Negative for pallor and rash.   Neurological:  Negative for dizziness, syncope, speech difficulty and light-headedness.   Hematological:  Does not bruise/bleed easily.   Psychiatric/Behavioral:  Negative for agitation, behavioral problems and confusion.        Physical Exam:  Physical Exam  Vitals reviewed.   Constitutional:       General: He is not in acute distress.     " Appearance: Normal appearance. He is well-developed. He is not diaphoretic.   HENT:      Head: Normocephalic and atraumatic.      Nose: Nose normal.   Eyes:      General: No scleral icterus.     Extraocular Movements: Extraocular movements intact.      Pupils: Pupils are equal, round, and reactive to light.   Neck:      Vascular: No JVD.   Cardiovascular:      Rate and Rhythm: Normal rate and regular rhythm.      Heart sounds: S1 normal and S2 normal. Heart sounds not distant. No murmur heard.     No systolic murmur is present.      No friction rub. No gallop. No S3 sounds.   Pulmonary:      Effort: Pulmonary effort is normal. No respiratory distress.      Breath sounds: Normal breath sounds. No wheezing or rales.   Abdominal:      General: Bowel sounds are normal. There is no distension.      Palpations: Abdomen is soft.   Musculoskeletal:         General: No deformity.      Cervical back: Normal range of motion and neck supple.      Right lower leg: No edema.      Left lower leg: No edema.   Skin:     General: Skin is warm and dry.      Findings: No erythema.   Neurological:      Mental Status: He is alert and oriented to person, place, and time.      Cranial Nerves: No cranial nerve deficit.   Psychiatric:         Mood and Affect: Mood normal.         Behavior: Behavior normal.         Patient Active Problem List   Diagnosis    Erectile dysfunction    Hyperlipidemia    Asthma    Nicotine dependence    Psoriasis    Urinary frequency    Annual physical exam    Overweight (BMI 25.0-29.9)    Elevated fasting blood sugar    Tobacco abuse    Neck pain    Abnormal CBC    Flu vaccine need    Syncope and collapse    Other chest pain    Malignant melanoma of torso excluding breast (HCC)     Past Medical History:   Diagnosis Date    Asthma     Asthma 09/04/2012    Hyperlipidemia     Laceration of right middle finger without foreign body with damage to nail 04/01/2019    Lightheadedness 01/14/2019    Open displaced fracture  of distal phalanx of right middle finger 04/01/2019    Urinary frequency      Social History     Socioeconomic History    Marital status: /Civil Union     Spouse name: Not on file    Number of children: Not on file    Years of education: Not on file    Highest education level: Not on file   Occupational History    Not on file   Tobacco Use    Smoking status: Every Day     Current packs/day: 1.00     Average packs/day: 1 pack/day for 36.7 years (36.7 ttl pk-yrs)     Types: Cigarettes     Start date: 5/10/1988     Passive exposure: Past    Smokeless tobacco: Never   Vaping Use    Vaping status: Never Used   Substance and Sexual Activity    Alcohol use: Yes     Alcohol/week: 24.0 standard drinks of alcohol     Types: 24 Cans of beer per week     Comment: Drink mostly on weekends    Drug use: Yes     Frequency: 1.0 times per week     Types: Marijuana     Comment: Once a weekend    Sexual activity: Yes     Partners: Female     Birth control/protection: None   Other Topics Concern    Not on file   Social History Narrative    Not on file     Social Drivers of Health     Financial Resource Strain: Not on file   Food Insecurity: Not on file   Transportation Needs: Not on file   Physical Activity: Not on file   Stress: Not on file   Social Connections: Not on file   Intimate Partner Violence: Not on file   Housing Stability: Not on file      Family History   Problem Relation Age of Onset    Mental illness Mother     Heart disease Mother     Multiple sclerosis Mother     No Known Problems Father     No Known Problems Brother     No Known Problems Maternal Grandmother      Past Surgical History:   Procedure Laterality Date    HERNIA REPAIR  1994       Current Outpatient Medications:     albuterol (PROVENTIL HFA,VENTOLIN HFA) 90 mcg/act inhaler, TAKE 2 PUFFS BY MOUTH EVERY 6 HOURS AS NEEDED FOR WHEEZE, Disp: 8.5 Inhaler, Rfl: 0    aspirin 81 mg chewable tablet, Chew 81 mg daily, Disp: , Rfl:     Multiple Vitamins-Minerals  (MULTIVITAMIN WITH MINERALS) tablet, Take 1 tablet by mouth daily, Disp: , Rfl:     rosuvastatin (CRESTOR) 20 MG tablet, Take 1 tablet (20 mg total) by mouth daily, Disp: 90 tablet, Rfl: 3    Varenicline Tartrate, Starter, (Chantix Starting Month Kaden) 0.5 MG X 11 & 1 MG X 42 TBPK, 0.5 mg once daily for 3 days then 0.5mg twice daily for days 4-7 then 1 mg twice daily, Disp: 53 each, Rfl: 0  Allergies   Allergen Reactions    Penicillins        Labs:  Lab Requisition on 12/04/2024   Component Date Value    Case Report 12/04/2024                      Value:Surgical Pathology Report                         Case: [id number]                                  Authorizing Provider:  Maikel Chin MD           Collected:           12/04/2024 [facility]              Ordering Location:     [facility]      Received:            12/04/2024 [address] [place] [address] Specialty                                                                                  Laboratory                                                                   Pathologist:           Varinder Baker MD                                                           Specimen:    Skin, Other, Mid Upper Back, Skin Biopsy (13 slides [id number] [facility],               collected 11/13/2024)                                                                      Final Diagnosis 12/04/2024                      Value:Consult Case (13 slides [id number] [facility], collected 11/13/2024):    A. Skin, mid upper back:    MELANOMA (thickness: 0.7 mm); melanoma in situ extends to peripheral specimen (see note).    Note: The provided Melan-A immunostains were reviewed. Please see synoptic report for additional details.    Synoptic report for melanoma of the skin  Thickness: 0.7 mm  Ulceration: not seen  Anatomic (Allen) level: IV  Type: superficial spreading melanoma  Mitoses: 0/mm2  Microsatellites: not seen  Lymphovascular invasion:  not seen  Neurotropism: not seen  Tumor regression: present  Tumor-infiltrating lymphocytes (TIL): present, brisk   Margin assessment: melanoma in situ extends to peripheral specimen margin  Pathologic stage: pT1a  Associated nevus: not seen        Additional Information 12/04/2024                      Value:All reported additional testing was performed with appropriately reactive controls.  These tests were developed and their performance characteristics determined by [facility] or appropriate performing facility, though some tests may be performed on tissues which have not been validated for performance characteristics (such as staining performed on alcohol exposed cell blocks and decalcified tissues).  Results should be interpreted with caution and in the context of the patients’ clinical condition. These tests may not be cleared or approved by the U.S. Food and Drug Administration, though the FDA has determined that such clearance or approval is not necessary. These tests are used for clinical purposes and they should not be regarded as investigational or for research. This laboratory has been approved by [place] [address], designated as a high-complexity laboratory and is qualified to perform these tests.  .      Gross Description 12/04/2024                      Value:Received for consult from [facility], [address] [place] [address], [address], [place] [address] ([phone])  13 slides labeled [id number] and the corresponding pathology report on patient Balta Damioc.      Clinical Information 12/04/2024                      Value:15 x 12 mm. MM.   Hospital Outpatient Visit on 10/07/2024   Component Date Value    Baseline HR 10/07/2024 61     Baseline BP 10/07/2024 140/96     O2 sat rest 10/07/2024 99     Stress peak HR 10/07/2024 133     Post peak BP 10/07/2024 174     O2 sat peak 10/07/2024 98     Recovery HR 10/07/2024 76     Recovery BP 10/07/2024 152/90     O2 sat recovery  10/07/2024 98     Max HR 10/07/2024 133     Max HR Percent 10/07/2024 78     Exercise duration (min) 10/07/2024 10     Exercise duration (sec) 10/07/2024 19     Estimated workload 10/07/2024 12.2     Rate Pressure Product 10/07/2024 23,142.0     Angina Index 10/07/2024 0     Stress Stage Reached 10/07/2024 4.0     Renteria Treadmill Score 10/07/2024 10     ST Depression (mm) 10/07/2024 0.0     Protocol Name 10/07/2024 LISETTE     Exercise duration (min) 10/07/2024 10     Exercise duration (sec) 10/07/2024 19     Post Peak Systolic BP 10/07/2024 174     Max Diastolic Bp 10/07/2024 92     Peak HR 10/07/2024 133     Max Predicted Heart Rate 10/07/2024 169     Reason for Termination 10/07/2024 leg muscle tightness, short of breath     Test Indication 10/07/2024 CHEST PAIN, SYNCOPE     Target Hr Formular 10/07/2024 (220 - Age)*100%     Chest Pain Statement 10/07/2024 none    Hospital Outpatient Visit on 10/07/2024   Component Date Value    BSA 10/07/2024 2.09     LVIDd 10/07/2024 5.00     LVIDS 10/07/2024 2.80     IVSd 10/07/2024 1.00     LVPWd 10/07/2024 1.00     FS 10/07/2024 44     MV E' Tissue Velocity Se* 10/07/2024 10     LA Volume Index (BP) 10/07/2024 14.8     E/A ratio 10/07/2024 0.84     E wave deceleration time 10/07/2024 204     MV Peak E Domingo 10/07/2024 58     MV Peak A Domingo 10/07/2024 0.69     RVID d 10/07/2024 4.0     Tricuspid annular plane * 10/07/2024 2.30     LA size 10/07/2024 3.7     LA length (A2C) 10/07/2024 4.10     LA volume (BP) 10/07/2024 31     RAA A4C 10/07/2024 15.9     MV stenosis pressure 1/2* 10/07/2024 59     MV valve area p 1/2 meth* 10/07/2024 3.73     Ao root 10/07/2024 3.10     Asc Ao 10/07/2024 3.4     Left ventricular stroke * 10/07/2024 92.00     IVS 10/07/2024 1     LEFT VENTRICLE SYSTOLIC * 10/07/2024 29     LV DIASTOLIC VOLUME (MOD* 10/07/2024 121     Left Atrium Area-systoli* 10/07/2024 9.3     Left Atrium Area-systoli* 10/07/2024 16.8     LVSV, 2D 10/07/2024 92     LV EF 10/07/2024  60    Orders Only on 09/14/2024   Component Date Value    Total Cholesterol 09/14/2024 189     HDL 09/14/2024 40     Triglycerides 09/14/2024 120     LDL Calculated 09/14/2024 126 (H)     Chol HDLC Ratio 09/14/2024 4.7     Non-HDL Cholesterol 09/14/2024 149 (H)    Admission on 09/01/2024, Discharged on 09/01/2024   Component Date Value    Ventricular Rate 09/01/2024 79     Atrial Rate 09/01/2024 79     DE Interval 09/01/2024 134     QRSD Interval 09/01/2024 98     QT Interval 09/01/2024 368     QTC Interval 09/01/2024 421     P Axis 09/01/2024 59     QRS Axis 09/01/2024 57     T Wave Axis 09/01/2024 66     WBC 09/01/2024 8.57     RBC 09/01/2024 4.31     Hemoglobin 09/01/2024 14.6     Hematocrit 09/01/2024 42.3     MCV 09/01/2024 98     MCH 09/01/2024 33.9     MCHC 09/01/2024 34.5     RDW 09/01/2024 13.0     MPV 09/01/2024 9.8     Platelets 09/01/2024 255     nRBC 09/01/2024 0     Segmented % 09/01/2024 48     Immature Grans % 09/01/2024 0     Lymphocytes % 09/01/2024 42     Monocytes % 09/01/2024 5     Eosinophils Relative 09/01/2024 4     Basophils Relative 09/01/2024 1     Absolute Neutrophils 09/01/2024 4.10     Absolute Immature Grans 09/01/2024 0.01     Absolute Lymphocytes 09/01/2024 3.61     Absolute Monocytes 09/01/2024 0.43     Eosinophils Absolute 09/01/2024 0.35     Basophils Absolute 09/01/2024 0.07     Sodium 09/01/2024 143     Potassium 09/01/2024 3.6     Chloride 09/01/2024 110 (H)     CO2 09/01/2024 20 (L)     ANION GAP 09/01/2024 13     BUN 09/01/2024 13     Creatinine 09/01/2024 0.84     Glucose 09/01/2024 109     Calcium 09/01/2024 9.5     AST 09/01/2024 15     ALT 09/01/2024 18     Alkaline Phosphatase 09/01/2024 53     Total Protein 09/01/2024 7.1     Albumin 09/01/2024 4.6     Total Bilirubin 09/01/2024 0.32     eGFR 09/01/2024 101     hs TnI 0hr 09/01/2024 <2     hs TnI 2hr 09/01/2024 <2     Delta 2hr hsTnI 09/01/2024       Lab Results   Component Value Date    CHOL 250 (H) 04/26/2017    TRIG  120 09/14/2024    HDL 40 09/14/2024     Imaging: XR chest 1 view portable    Result Date: 9/3/2024  Narrative: XR CHEST PORTABLE INDICATION: cp.   Per my review of the medical record, patient states he was at a party drinking and had sudden chest pain. EMS reports patient had episodes of staring and was unresponsive answering questions. COMPARISON: CXR 9/21/2020 and 1/2/2019. FINDINGS: Clear lungs. No pneumothorax or pleural effusion. Normal cardiomediastinal silhouette. Bones are unremarkable for age. Normal upper abdomen.     Impression: No acute cardiopulmonary disease. Workstation performed: [id number]

## 2025-02-04 ENCOUNTER — HOSPITAL ENCOUNTER (OUTPATIENT)
Dept: SLEEP CENTER | Facility: CLINIC | Age: 50
Discharge: HOME | End: 2025-02-04
Payer: COMMERCIAL

## 2025-02-04 DIAGNOSIS — Z91.89 AT RISK FOR APNEA: ICD-10-CM

## 2025-02-04 PROCEDURE — 95810 POLYSOM 6/> YRS 4/> PARAM: CPT | Performed by: STUDENT IN AN ORGANIZED HEALTH CARE EDUCATION/TRAINING PROGRAM

## 2025-02-04 PROCEDURE — 95810 POLYSOM 6/> YRS 4/> PARAM: CPT

## 2025-02-05 ENCOUNTER — RESULTS FOLLOW-UP (OUTPATIENT)
Dept: CARDIOLOGY CLINIC | Facility: CLINIC | Age: 50
End: 2025-02-05

## 2025-02-05 ENCOUNTER — DOCUMENTATION (OUTPATIENT)
Dept: SLEEP CENTER | Facility: CLINIC | Age: 50
End: 2025-02-05

## 2025-02-05 DIAGNOSIS — G47.33 OBSTRUCTIVE SLEEP APNEA: Primary | ICD-10-CM

## 2025-02-05 PROBLEM — Z91.89 AT RISK FOR APNEA: Status: ACTIVE | Noted: 2025-02-05

## 2025-02-05 NOTE — PROGRESS NOTES
Sleep Study Documentation    Pre-Sleep Study       Sleep testing procedure explained to patient:YES    Patient napped prior to study:NO    Caffeine:Dayshift worker after 12PM.  Caffeine use:YES- coffee  18 ounces or more    Alcohol:Dayshift workers after 5PM: Alcohol use:NO    Typical day for patient:YES       Study Documentation    Sleep Study Indications:     Sleep Study: Diagnostic   Snore:Severe  Supplemental O2: no    O2 flow rate (L/min) range   O2 flow rate (L/min) final   Minimum SaO2 74%  Baseline SaO2 95%      EKG abnormalities: no     EEG abnormalities: no    Were abnormal behaviors in sleep observed:NO    Is Total Sleep Study Recording Time < 2 hours: N/A    Is Total Sleep Study Recording Time > 2 hours but study is incomplete: N/A    Is Total Sleep Study Recording Time 6 hours or more but sleep was not obtained: NO    Patient classification: employed       Post-Sleep Study    Medication used at bedtime or during sleep study:NO    Patient reports time it took to fall asleep:20 to 30 minutes    Patient reports waking up during study:3 or more times.  Patient reports returning to sleep without difficulty.    Patient reports sleeping 4 to 6 hours without dreaming.    Does the Patient feel this is a typical night of sleep:worse than usual    Patient rated sleepiness: Somewhat sleepy or tired    PAP treatment:no.

## 2025-02-05 NOTE — PROGRESS NOTES
Patient with recent diagnostic in-lab sleep study which revealed severe obstructive sleep apnea with significant hypoxia.    CPAP titration study is recommended given severity of sleep disordered breathing.    I have messaged ordering clinician regarding study results.

## 2025-02-06 ENCOUNTER — TELEPHONE (OUTPATIENT)
Dept: SLEEP CENTER | Facility: CLINIC | Age: 50
End: 2025-02-06

## 2025-02-06 NOTE — TELEPHONE ENCOUNTER
Per Dr. Edge:  Patient with recent diagnostic in-lab sleep study which revealed severe obstructive sleep apnea with significant hypoxia.     CPAP titration study is recommended given severity of sleep disordered breathing.     I have messaged ordering clinician regarding study results.        ------------------------------------    Referral for sleep consult placed by Dr. Soliman, cardiology.    Called patient and left message advising I will send a eSnips message with the sleep study results and next steps.  Provided sleep center number([phone]) to call if any questions.     Patient needs to schedule sleep consult.

## 2025-02-07 ENCOUNTER — TELEPHONE (OUTPATIENT)
Age: 50
End: 2025-02-07

## 2025-02-07 NOTE — TELEPHONE ENCOUNTER
Received call from Pt wife Jeannie, on consent, to schedule sleep consult after sleep study 2/4/25. Scheduled Pt with Dr Edge 4/21/25.

## 2025-02-13 ENCOUNTER — PROCEDURE VISIT (OUTPATIENT)
Dept: SURGICAL ONCOLOGY | Facility: CLINIC | Age: 50
End: 2025-02-13
Payer: COMMERCIAL

## 2025-02-13 VITALS
RESPIRATION RATE: 16 BRPM | TEMPERATURE: 98.3 F | OXYGEN SATURATION: 98 % | HEART RATE: 68 BPM | DIASTOLIC BLOOD PRESSURE: 80 MMHG | BODY MASS INDEX: 28.28 KG/M2 | SYSTOLIC BLOOD PRESSURE: 110 MMHG | WEIGHT: 202 LBS | HEIGHT: 71 IN

## 2025-02-13 DIAGNOSIS — C43.59 MALIGNANT MELANOMA OF TORSO EXCLUDING BREAST (HCC): Primary | ICD-10-CM

## 2025-02-13 PROCEDURE — 88305 TISSUE EXAM BY PATHOLOGIST: CPT | Performed by: PATHOLOGY

## 2025-02-13 PROCEDURE — 12034 INTMD RPR S/TR/EXT 7.6-12.5: CPT | Performed by: SURGERY

## 2025-02-13 PROCEDURE — 11603 EXC TR-EXT MAL+MARG 2.1-3 CM: CPT | Performed by: SURGERY

## 2025-02-13 NOTE — PROGRESS NOTES
Surgical Oncology Follow Up       [facility] [place]  [address] [facility] [address]  [phone]    Balta Damico  1975  [id number]  [facility] [place]  [address] [facility] [address]  [phone]    1. Malignant melanoma of torso excluding breast (HCC)  Assessment & Plan:  49-year-old male status post wide excision of a [id number] melanoma.  He tolerated wide excision well.  He was instructed on signs and symptoms of infection.  I will see him again in 2 weeks for a wound check and to discuss the pathology.  He is agreeable to this plan.  All his questions were answered.  Orders:  -     Tissue Exam         Chief Complaint   Patient presents with    Procedure       Return in about 2 weeks (around 2/27/2025) for Ofice visit short.      Oncology History   Malignant melanoma of torso excluding breast (HCC)   11/13/2024 Biopsy    Skin, mid upper back:     MELANOMA (thickness: 0.7 mm); melanoma in situ extends to peripheral specimen.     Synoptic report for melanoma of the skin  Thickness: 0.7 mm  Ulceration: not seen  Mitoses: 0/mm2  Margin assessment: melanoma in situ extends to peripheral specimen margin  Pathologic stage: pT1a       12/13/2024 -  Cancer Staged    Staging form: Melanoma of the Skin, AJCC 8th Edition  - Clinical: Stage IA (cT1a, cN0, cM0) - Signed by Maikel Chin MD on 12/13/2024           Staging: [id number] melanoma of the back, February 2025  Treatment history: Patient back melanoma, February 2025  Current treatment: Observation  Disease status: GRABIEL    History of Present Illness: Patient returns for wide excision of his back melanoma.    Review of Systems  Complete ROS Surg Onc:   Complete ROS Surg Onc:   Constitutional: The patient denies new or recent history of general fatigue, no recent weight loss, no change in appetite.   Eyes: No  complaints of visual problems, no scleral icterus.   ENT: no complaints of ear pain, no hoarseness, no difficulty swallowing,  no tinnitus and no new masses in head, oral cavity, or neck.   Cardiovascular: No complaints of chest pain, no palpitations, no ankle edema.   Respiratory: No complaints of shortness of breath, no cough.   Gastrointestinal: No complaints of jaundice, no bloody stools, no pale stools.   Genitourinary: No complaints of dysuria, no hematuria, no nocturia, no frequent urination, no urethral discharge.   Musculoskeletal: No complaints of weakness, paralysis, joint stiffness or arthralgias.  Integumentary: No complaints of rash, no new lesions.   Neurological: No complaints of convulsions, no seizures, no dizziness.   Hematologic/Lymphatic: No complaints of easy bruising.   Endocrine:  No hot or cold intolerance.  No polydipsia, polyphagia, or polyuria.  Allergy/immunology:  No environmental allergies.  No food allergies.  Not immunocompromised.  Skin:  No pallor or rash.  No wound.        Patient Active Problem List   Diagnosis    Erectile dysfunction    Hyperlipidemia    Asthma    Nicotine dependence    Psoriasis    Urinary frequency    Annual physical exam    Overweight (BMI 25.0-29.9)    Elevated fasting blood sugar    Tobacco abuse    Neck pain    Abnormal CBC    Flu vaccine need    Syncope and collapse    Other chest pain    Malignant melanoma of torso excluding breast (HCC)    At risk for apnea    Obstructive sleep apnea     Past Medical History:   Diagnosis Date    Asthma     Asthma 09/04/2012    Hyperlipidemia     Laceration of right middle finger without foreign body with damage to nail 04/01/2019    Lightheadedness 01/14/2019    Open displaced fracture of distal phalanx of right middle finger 04/01/2019    Urinary frequency      Past Surgical History:   Procedure Laterality Date    HERNIA REPAIR  1994     Family History   Problem Relation Age of Onset    Mental illness Mother     Heart  disease Mother     Multiple sclerosis Mother     No Known Problems Father     No Known Problems Brother     No Known Problems Maternal Grandmother      Social History     Socioeconomic History    Marital status: /Civil Union     Spouse name: Not on file    Number of children: Not on file    Years of education: Not on file    Highest education level: Not on file   Occupational History    Not on file   Tobacco Use    Smoking status: Every Day     Current packs/day: 1.00     Average packs/day: 1 pack/day for 36.8 years (36.8 ttl pk-yrs)     Types: Cigarettes     Start date: 5/10/1988     Passive exposure: Past    Smokeless tobacco: Never   Vaping Use    Vaping status: Never Used   Substance and Sexual Activity    Alcohol use: Yes     Alcohol/week: 24.0 standard drinks of alcohol     Types: 24 Cans of beer per week     Comment: Drink mostly on weekends    Drug use: Yes     Frequency: 1.0 times per week     Types: Marijuana     Comment: Once a weekend    Sexual activity: Yes     Partners: Female     Birth control/protection: None   Other Topics Concern    Not on file   Social History Narrative    Not on file     Social Drivers of Health     Financial Resource Strain: Not on file   Food Insecurity: Not on file   Transportation Needs: Not on file   Physical Activity: Not on file   Stress: Not on file   Social Connections: Not on file   Intimate Partner Violence: Not on file   Housing Stability: Not on file       Current Outpatient Medications:     albuterol (PROVENTIL HFA,VENTOLIN HFA) 90 mcg/act inhaler, TAKE 2 PUFFS BY MOUTH EVERY 6 HOURS AS NEEDED FOR WHEEZE, Disp: 8.5 Inhaler, Rfl: 0    aspirin 81 mg chewable tablet, Chew 81 mg daily, Disp: , Rfl:     Multiple Vitamins-Minerals (MULTIVITAMIN WITH MINERALS) tablet, Take 1 tablet by mouth daily, Disp: , Rfl:     rosuvastatin (CRESTOR) 20 MG tablet, Take 1 tablet (20 mg total) by mouth daily, Disp: 90 tablet, Rfl: 3    Varenicline Tartrate, Starter, (Chantix  Starting Month Kaden) 0.5 MG X 11 & 1 MG X 42 TBPK, 0.5 mg once daily for 3 days then 0.5mg twice daily for days 4-7 then 1 mg twice daily, Disp: 53 each, Rfl: 0  Allergies   Allergen Reactions    Penicillins      Vitals:    25 1344   BP: 110/80   Pulse: 68   Resp: 16   Temp: 98.3 °F (36.8 °C)   SpO2: 98%       Physical Exam  Constitutional: General appearance: The Patient is well-developed and well-nourished who appears the stated age in no acute distress. Patient is pleasant and talkative.     HEENT:  Normocephalic.  Sclerae are anicteric. Mucous membranes are moist.    Extremities: There is no clubbing or cyanosis. There is no edema.  Symmetric.  Neuro: Grossly nonfocal. Gait is normal.       Skin: Warm, anicteric.  There is a healing excision site on the back.  Psych:  Patient is pleasant and talkative.  Breasts:        Pathology:  [unfilled]    Labs:      Imaging  Diagnostic Sleep Study  Result Date: 2025  Narrative: Table formatting from the original result was not included. Images from the original result were not included. Diagnostic Report Patient Name: Balta Damico Patient Details: Male, 49 years  Patient #: [id number] : 1975 Referring Physician: Jessee Sloiman MD Height: 71.0 in Interpreting Physician: Mayco Edge MD Weight: 206.0 lbs Study Date: 2025 B.M.I.: 28.7 STUDY FORMAT: The patient was admitted to the sleep laboratory at the [place] campus of the [facility] and oriented to procedures and equipment.  Data was obtained using the [employer] sleep monitoring system; Parameters monitored: EEG (frontal, central, occipital), EOG, EMG (chin and leg), ECG, body position, abdominal and thoracic respiratory effort, snoring, pulse oximetry, and airflow.  The sleep study was scored following the rules established by the American Academy of Sleep Medicine (AASM).  Hypopnea: event lasting >=10 seconds with a >=30% reduction in airflow amplitude  and a >=4% decrease in SpO2. HISTORY: Balta Damico is a 48 y/o M with a PMH significant for HLD who presents for new patient evaluation of sleep disordered breathing. SLEEP: Patient slept for 292.0 minutes out of 429.6 minutes in bed representing a sleep efficiency of 68.0%.  Sleep architecture showed a sleep latency of 35.4 minutes and a REM sleep latency of 21.5 minutes.  There was 0.0% Stage N3 noted. There was 11.5% REM sleep noted. The patient had 215 arousals for an average of 44.2 arousals per hour of sleep. RESPIRATORY: There were a total of 265 respiratory events made up of 231 obstructive apneas, 0 mixed apneas, 0 central apneas, and 34 hypopneas resulting in an apnea/hypopnea index (AHI) of 54.5 events per hour of sleep.  The AHI in the supine position was N/A.  The AHI during REM sleep was 55.5. OXIMETRY: The baseline oxygen saturation with the patient awake was 95.0%.  The mean oxygen saturation throughout the study was 92.1%.  The amount of sleep time less than or equal to 89% was 60.8 minutes.  The lowest oxygen saturation was 74.0%. BODY POSITION: The patient slept 0.0% of the evening in the supine position, 28.3% on left side, 71.7% on right side, and 0.0%  in the prone position. LEG MOVEMENT: There were 36 PLMs; PLM index of 7.4; 2 PLMs associated with arousal; PLM w/arousal index of 0.4. SUMMARY:       TOTAL INDEX Apneas and Hypopneas 265 54.5 Central Apneas 0 0.0 Arousals 215 44.2 PLMs with arousal 2 0.4     Impression:   Severe obstructive sleep apnea. Respiratory events were worse in REM sleep.. Normal baseline oxygen saturation with event related desaturations observed. Moderate to severe desaturations present with respiratory events. Sleep efficiency was low.. Stage N1 sleep  percentage (light sleep) was increased.. Stage N3 sleep (deep sleep) was absent.  REM sleep percentage was decreased on this test. The number of periodic movements during sleep was insignificant. Average pulse was  normal .  EKG showed normal sinus rhythm. RECOMMENDATION: This diagnostic in lab sleep study revealed severe obstructive sleep apnea with an overall AHI of 54.5 events per hour.  Respiratory events were worse during REM sleep.  It should be noted that supine sleep was not captured during the study which may underestimate disease severity.  Significant hypoxia was present during the study.  The patient spent approximately 60.8 minutes with SpO2 under 89%.  Due to severity of sleep disordered breathing recommend CPAP titration study for further evaluation.  If CPAP titration study cannot be scheduled, recommend auto CPAP for treatment of sleep disordered breathing.  Follow-up with ordering clinician.  Sleep medicine consultation is recommended. Mayco Edge MD Board Eligible Sleep Physician Patient Name: Balta Damico Sex: Male Study Date: 2025 : 1975 Subject Code: [id number] Age: 49 Referring Physician: Jessee Soliman MD Height: 71.0 in Sleep Specialist: Mayco Edge MD Weight: 206.0 lbs Recording Tech: RAMON Pardo B.M.I.: 28.7 Scoring Tech: RAMON Pardo MEDICATIONS: RECORDING / COLLECTION NOTES: HISTORY AND PHYSICAL: Patient is a 49 year old male with complaints of choking and gasping during sleep. A diagnostic study is ordered. STAGES: EKG: NSR PLMS: Frequent leg movements. RESPIRATORY EVENTS: Severe TUCKER and hypopneas noted with moderate to loud snoring. SCORING / ANALYSIS NOTES:     I personally reviewed and interpreted the above laboratory and imaging data.    Procedure:  Lesion measures 0.3 x 3.5 cm  Wide excision measures 2.5 x 8 cm    Under sterile conditions and local anesthesia, 1 cm margins were mapped out circumferentially around the lesion.  An elliptical incision measuring 2.5 x 8 cm was made.  Using sharp dissection this was taken through the skin, subtenons tissue and down to the underlying muscle fascia.  This was excised off the fascia.  Specimen  was oriented.  Wound was copiously irrigated.  There was excellent hemostasis.  Incision was approximated interrupted 2-0 Vicryl suture subcutaneously and staples for the skin.  Sterile dressings were applied.  He tolerated this well.

## 2025-02-13 NOTE — ASSESSMENT & PLAN NOTE
49-year-old male status post wide excision of a [id number] melanoma.  He tolerated wide excision well.  He was instructed on signs and symptoms of infection.  I will see him again in 2 weeks for a wound check and to discuss the pathology.  He is agreeable to this plan.  All his questions were answered.

## 2025-02-13 NOTE — LETTER
February 13, 2025     Lana Kendall DO  [address] [facility].  [address]  [facility] [address] [address]    Patient: Balta Damico   YOB: 1975   Date of Visit: 2/13/2025       Dear Dr. Anu Kendall:    Thank you for referring Balta aDmico to me for evaluation. Below are my notes for this consultation.    If you have questions, please do not hesitate to call me. I look forward to following your patient along with you.         Sincerely,        Maikel Chin MD        CC: MD Maikel Trevino MD  2/13/2025  2:32 PM  Sign when Signing Visit               Surgical Oncology Follow Up       [facility] [place]  [address] [address]  [phone]    Balta Damico  1975  [id number]  [facility]  [address] [address]  [phone]    1. Malignant melanoma of torso excluding breast (HCC)  Assessment & Plan:  49-year-old male status post wide excision of a [id number] melanoma.  He tolerated wide excision well.  He was instructed on signs and symptoms of infection.  I will see him again in 2 weeks for a wound check and to discuss the pathology.  He is agreeable to this plan.  All his questions were answered.  Orders:  -     Tissue Exam         Chief Complaint   Patient presents with   • Procedure       Return in about 2 weeks (around 2/27/2025) for Ofice visit short.      Oncology History   Malignant melanoma of torso excluding breast (HCC)   11/13/2024 Biopsy    Skin, mid upper back:     MELANOMA (thickness: 0.7 mm); melanoma in situ extends to peripheral specimen.     Synoptic report for melanoma of the skin  Thickness: 0.7 mm  Ulceration: not seen  Mitoses: 0/mm2  Margin assessment: melanoma in situ extends to peripheral specimen margin  Pathologic stage: pT1a       12/13/2024 -  Cancer Staged    Staging form:  Melanoma of the Skin, AJCC 8th Edition  - Clinical: Stage IA (cT1a, cN0, cM0) - Signed by Maikel Chin MD on 12/13/2024           Staging: [id number] melanoma of the back, February 2025  Treatment history: Patient back melanoma, February 2025  Current treatment: Observation  Disease status: GRABIEL    History of Present Illness: Patient returns for wide excision of his back melanoma.    Review of Systems  Complete ROS Surg Onc:   Complete ROS Surg Onc:   Constitutional: The patient denies new or recent history of general fatigue, no recent weight loss, no change in appetite.   Eyes: No complaints of visual problems, no scleral icterus.   ENT: no complaints of ear pain, no hoarseness, no difficulty swallowing,  no tinnitus and no new masses in head, oral cavity, or neck.   Cardiovascular: No complaints of chest pain, no palpitations, no ankle edema.   Respiratory: No complaints of shortness of breath, no cough.   Gastrointestinal: No complaints of jaundice, no bloody stools, no pale stools.   Genitourinary: No complaints of dysuria, no hematuria, no nocturia, no frequent urination, no urethral discharge.   Musculoskeletal: No complaints of weakness, paralysis, joint stiffness or arthralgias.  Integumentary: No complaints of rash, no new lesions.   Neurological: No complaints of convulsions, no seizures, no dizziness.   Hematologic/Lymphatic: No complaints of easy bruising.   Endocrine:  No hot or cold intolerance.  No polydipsia, polyphagia, or polyuria.  Allergy/immunology:  No environmental allergies.  No food allergies.  Not immunocompromised.  Skin:  No pallor or rash.  No wound.        Patient Active Problem List   Diagnosis   • Erectile dysfunction   • Hyperlipidemia   • Asthma   • Nicotine dependence   • Psoriasis   • Urinary frequency   • Annual physical exam   • Overweight (BMI 25.0-29.9)   • Elevated fasting blood sugar   • Tobacco abuse   • Neck pain   • Abnormal CBC   • Flu vaccine need   • Syncope and collapse    • Other chest pain   • Malignant melanoma of torso excluding breast (HCC)   • At risk for apnea   • Obstructive sleep apnea     Past Medical History:   Diagnosis Date   • Asthma    • Asthma 09/04/2012   • Hyperlipidemia    • Laceration of right middle finger without foreign body with damage to nail 04/01/2019   • Lightheadedness 01/14/2019   • Open displaced fracture of distal phalanx of right middle finger 04/01/2019   • Urinary frequency      Past Surgical History:   Procedure Laterality Date   • HERNIA REPAIR  1994     Family History   Problem Relation Age of Onset   • Mental illness Mother    • Heart disease Mother    • Multiple sclerosis Mother    • No Known Problems Father    • No Known Problems Brother    • No Known Problems Maternal Grandmother      Social History     Socioeconomic History   • Marital status: /Civil Union     Spouse name: Not on file   • Number of children: Not on file   • Years of education: Not on file   • Highest education level: Not on file   Occupational History   • Not on file   Tobacco Use   • Smoking status: Every Day     Current packs/day: 1.00     Average packs/day: 1 pack/day for 36.8 years (36.8 ttl pk-yrs)     Types: Cigarettes     Start date: 5/10/1988     Passive exposure: Past   • Smokeless tobacco: Never   Vaping Use   • Vaping status: Never Used   Substance and Sexual Activity   • Alcohol use: Yes     Alcohol/week: 24.0 standard drinks of alcohol     Types: 24 Cans of beer per week     Comment: Drink mostly on weekends   • Drug use: Yes     Frequency: 1.0 times per week     Types: Marijuana     Comment: Once a weekend   • Sexual activity: Yes     Partners: Female     Birth control/protection: None   Other Topics Concern   • Not on file   Social History Narrative   • Not on file     Social Drivers of Health     Financial Resource Strain: Not on file   Food Insecurity: Not on file   Transportation Needs: Not on file   Physical Activity: Not on file   Stress: Not on  file   Social Connections: Not on file   Intimate Partner Violence: Not on file   Housing Stability: Not on file       Current Outpatient Medications:   •  albuterol (PROVENTIL HFA,VENTOLIN HFA) 90 mcg/act inhaler, TAKE 2 PUFFS BY MOUTH EVERY 6 HOURS AS NEEDED FOR WHEEZE, Disp: 8.5 Inhaler, Rfl: 0  •  aspirin 81 mg chewable tablet, Chew 81 mg daily, Disp: , Rfl:   •  Multiple Vitamins-Minerals (MULTIVITAMIN WITH MINERALS) tablet, Take 1 tablet by mouth daily, Disp: , Rfl:   •  rosuvastatin (CRESTOR) 20 MG tablet, Take 1 tablet (20 mg total) by mouth daily, Disp: 90 tablet, Rfl: 3  •  Varenicline Tartrate, Starter, (Chantix Starting Month ) 0.5 MG X 11 & 1 MG X 42 TBPK, 0.5 mg once daily for 3 days then 0.5mg twice daily for days 4-7 then 1 mg twice daily, Disp: 53 each, Rfl: 0  Allergies   Allergen Reactions   • Penicillins      Vitals:    25 1344   BP: 110/80   Pulse: 68   Resp: 16   Temp: 98.3 °F (36.8 °C)   SpO2: 98%       Physical Exam  Constitutional: General appearance: The Patient is well-developed and well-nourished who appears the stated age in no acute distress. Patient is pleasant and talkative.     HEENT:  Normocephalic.  Sclerae are anicteric. Mucous membranes are moist.    Extremities: There is no clubbing or cyanosis. There is no edema.  Symmetric.  Neuro: Grossly nonfocal. Gait is normal.       Skin: Warm, anicteric.  There is a healing excision site on the back.  Psych:  Patient is pleasant and talkative.  Breasts:        Pathology:  [unfilled]    Labs:      Imaging  Diagnostic Sleep Study  Result Date: 2025  Narrative: Table formatting from the original result was not included. Images from the original result were not included. Diagnostic Report Patient Name: Balta Damico Patient Details: Male, 49 years  Patient #: [id number] : 1975 Referring Physician: Jessee Soliman MD Height: 71.0 in Interpreting Physician: Mayco Edge MD Weight: 206.0 lbs Study Date:  2/4/2025 B.M.I.: 28.7 STUDY FORMAT: The patient was admitted to the sleep laboratory at the [facility] of the [facility] and oriented to procedures and equipment.  Data was obtained using the navabi sleep monitoring system; Parameters monitored: EEG (frontal, central, occipital), EOG, EMG (chin and leg), ECG, body position, abdominal and thoracic respiratory effort, snoring, pulse oximetry, and airflow.  The sleep study was scored following the rules established by the American Academy of Sleep Medicine (AASM).  Hypopnea: event lasting >=10 seconds with a >=30% reduction in airflow amplitude and a >=4% decrease in SpO2. HISTORY: Balta Damico is a 48 y/o M with a PMH significant for HLD who presents for new patient evaluation of sleep disordered breathing. SLEEP: Patient slept for 292.0 minutes out of 429.6 minutes in bed representing a sleep efficiency of 68.0%.  Sleep architecture showed a sleep latency of 35.4 minutes and a REM sleep latency of 21.5 minutes.  There was 0.0% Stage N3 noted. There was 11.5% REM sleep noted. The patient had 215 arousals for an average of 44.2 arousals per hour of sleep. RESPIRATORY: There were a total of 265 respiratory events made up of 231 obstructive apneas, 0 mixed apneas, 0 central apneas, and 34 hypopneas resulting in an apnea/hypopnea index (AHI) of 54.5 events per hour of sleep.  The AHI in the supine position was N/A.  The AHI during REM sleep was 55.5. OXIMETRY: The baseline oxygen saturation with the patient awake was 95.0%.  The mean oxygen saturation throughout the study was 92.1%.  The amount of sleep time less than or equal to 89% was 60.8 minutes.  The lowest oxygen saturation was 74.0%. BODY POSITION: The patient slept 0.0% of the evening in the supine position, 28.3% on left side, 71.7% on right side, and 0.0%  in the prone position. LEG MOVEMENT: There were 36 PLMs; PLM index of 7.4; 2 PLMs associated with arousal; PLM  w/arousal index of 0.4. SUMMARY:       TOTAL INDEX Apneas and Hypopneas 265 54.5 Central Apneas 0 0.0 Arousals 215 44.2 PLMs with arousal 2 0.4     Impression:   Severe obstructive sleep apnea. Respiratory events were worse in REM sleep.. Normal baseline oxygen saturation with event related desaturations observed. Moderate to severe desaturations present with respiratory events. Sleep efficiency was low.. Stage N1 sleep  percentage (light sleep) was increased.. Stage N3 sleep (deep sleep) was absent.  REM sleep percentage was decreased on this test. The number of periodic movements during sleep was insignificant. Average pulse was normal .  EKG showed normal sinus rhythm. RECOMMENDATION: This diagnostic in lab sleep study revealed severe obstructive sleep apnea with an overall AHI of 54.5 events per hour.  Respiratory events were worse during REM sleep.  It should be noted that supine sleep was not captured during the study which may underestimate disease severity.  Significant hypoxia was present during the study.  The patient spent approximately 60.8 minutes with SpO2 under 89%.  Due to severity of sleep disordered breathing recommend CPAP titration study for further evaluation.  If CPAP titration study cannot be scheduled, recommend auto CPAP for treatment of sleep disordered breathing.  Follow-up with ordering clinician.  Sleep medicine consultation is recommended. Mayco Edge MD Board Eligible Sleep Physician Patient Name: Balta Damico Sex: Male Study Date: 2025 : 1975 Subject Code: [id number] Age: 49 Referring Physician: Jessee Soliman MD Height: 71.0 in Sleep Specialist: Mayco Edge MD Weight: 206.0 lbs Recording Tech: RAMON Pardo B.M.I.: 28.7 Scoring Tech: RAMON Pardo MEDICATIONS: RECORDING / COLLECTION NOTES: HISTORY AND PHYSICAL: Patient is a 49 year old male with complaints of choking and gasping during sleep. A diagnostic study is ordered. STAGES:  EKG: NSR PLMS: Frequent leg movements. RESPIRATORY EVENTS: Severe TUCKER and hypopneas noted with moderate to loud snoring. SCORING / ANALYSIS NOTES:     I personally reviewed and interpreted the above laboratory and imaging data.    Procedure:  Lesion measures 0.3 x 3.5 cm  Wide excision measures 2.5 x 8 cm    Under sterile conditions and local anesthesia, 1 cm margins were mapped out circumferentially around the lesion.  An elliptical incision measuring 2.5 x 8 cm was made.  Using sharp dissection this was taken through the skin, subtenons tissue and down to the underlying muscle fascia.  This was excised off the fascia.  Specimen was oriented.  Wound was copiously irrigated.  There was excellent hemostasis.  Incision was approximated interrupted 2-0 Vicryl suture subcutaneously and staples for the skin.  Sterile dressings were applied.  He tolerated this well.

## 2025-02-17 PROCEDURE — 88305 TISSUE EXAM BY PATHOLOGIST: CPT | Performed by: PATHOLOGY

## 2025-02-18 ENCOUNTER — TELEPHONE (OUTPATIENT)
Dept: SURGICAL ONCOLOGY | Facility: CLINIC | Age: 50
End: 2025-02-18

## 2025-02-27 ENCOUNTER — OFFICE VISIT (OUTPATIENT)
Dept: SURGICAL ONCOLOGY | Facility: CLINIC | Age: 50
End: 2025-02-27
Payer: COMMERCIAL

## 2025-02-27 VITALS
BODY MASS INDEX: 27.02 KG/M2 | WEIGHT: 193 LBS | SYSTOLIC BLOOD PRESSURE: 112 MMHG | OXYGEN SATURATION: 97 % | RESPIRATION RATE: 16 BRPM | HEART RATE: 77 BPM | TEMPERATURE: 98.4 F | DIASTOLIC BLOOD PRESSURE: 74 MMHG | HEIGHT: 71 IN

## 2025-02-27 DIAGNOSIS — C43.59 MALIGNANT MELANOMA OF TORSO EXCLUDING BREAST (HCC): Primary | ICD-10-CM

## 2025-02-27 PROCEDURE — 99212 OFFICE O/P EST SF 10 MIN: CPT | Performed by: SURGERY

## 2025-02-27 NOTE — ASSESSMENT & PLAN NOTE
49-year-old male status post wide excision of a [id number] melanoma.  He was negative.  We will see him again in 1 week to remove the staples.  I will see him again in 1 year in follow-up of his melanoma. He is agreeable to this plan.  All his questions were answered.

## 2025-02-27 NOTE — PROGRESS NOTES
Surgical Oncology Follow Up       [facility] [place]  [address] [facility] [address]  [phone]    Balta Damico  1975  [id number]  [facility] [place]  [address] [facility] [address]  [phone]    1. Malignant melanoma of torso excluding breast (HCC)  Assessment & Plan:  49-year-old male status post wide excision of a [id number] melanoma.  He was negative.  We will see him again in 1 week to remove the staples.  I will see him again in 1 year in follow-up of his melanoma. He is agreeable to this plan.  All his questions were answered.          Chief Complaint   Patient presents with    Office Visit       Return in about 1 year (around 2/27/2026) for Ofice visit short, with Halima.      Oncology History   Malignant melanoma of torso excluding breast (HCC)   11/13/2024 Biopsy    Skin, mid upper back:     MELANOMA (thickness: 0.7 mm); melanoma in situ extends to peripheral specimen.     Synoptic report for melanoma of the skin  Thickness: 0.7 mm  Ulceration: not seen  Mitoses: 0/mm2  Margin assessment: melanoma in situ extends to peripheral specimen margin  Pathologic stage: pT1a       12/13/2024 -  Cancer Staged    Staging form: Melanoma of the Skin, AJCC 8th Edition  - Clinical: Stage IA (cT1a, cN0, cM0) - Signed by Maikel Chin MD on 12/13/2024 2/13/2025 Surgery    Wide excision:  Skin, mid back:  SCAR SECONDARY TO PREVIOUS PROCEDURE         Staging: [id number] melanoma of the back, February 2025  Treatment history: Patient back melanoma, February 2025  Current treatment: Observation  Disease status: GRABIEL    History of Present Illness: Patient returns in follow-up.  He is doing well.  Pathology revealed no residual tumor.    Review of Systems  Complete ROS Surg Onc:   Complete ROS Surg Onc:   Constitutional: The patient denies new or recent  history of general fatigue, no recent weight loss, no change in appetite.   Eyes: No complaints of visual problems, no scleral icterus.   ENT: no complaints of ear pain, no hoarseness, no difficulty swallowing,  no tinnitus and no new masses in head, oral cavity, or neck.   Cardiovascular: No complaints of chest pain, no palpitations, no ankle edema.   Respiratory: No complaints of shortness of breath, no cough.   Gastrointestinal: No complaints of jaundice, no bloody stools, no pale stools.   Genitourinary: No complaints of dysuria, no hematuria, no nocturia, no frequent urination, no urethral discharge.   Musculoskeletal: No complaints of weakness, paralysis, joint stiffness or arthralgias.  Integumentary: No complaints of rash, no new lesions.   Neurological: No complaints of convulsions, no seizures, no dizziness.   Hematologic/Lymphatic: No complaints of easy bruising.   Endocrine:  No hot or cold intolerance.  No polydipsia, polyphagia, or polyuria.  Allergy/immunology:  No environmental allergies.  No food allergies.  Not immunocompromised.  Skin:  No pallor or rash.  No wound.        Patient Active Problem List   Diagnosis    Erectile dysfunction    Hyperlipidemia    Asthma    Nicotine dependence    Psoriasis    Urinary frequency    Annual physical exam    Overweight (BMI 25.0-29.9)    Elevated fasting blood sugar    Tobacco abuse    Neck pain    Abnormal CBC    Flu vaccine need    Syncope and collapse    Other chest pain    Malignant melanoma of torso excluding breast (HCC)    At risk for apnea    Obstructive sleep apnea     Past Medical History:   Diagnosis Date    Asthma     Asthma 09/04/2012    Hyperlipidemia     Laceration of right middle finger without foreign body with damage to nail 04/01/2019    Lightheadedness 01/14/2019    Open displaced fracture of distal phalanx of right middle finger 04/01/2019    Urinary frequency      Past Surgical History:   Procedure Laterality Date    HERNIA REPAIR  1994      Family History   Problem Relation Age of Onset    Mental illness Mother     Heart disease Mother     Multiple sclerosis Mother     No Known Problems Father     No Known Problems Brother     No Known Problems Maternal Grandmother      Social History     Socioeconomic History    Marital status: /Civil Union     Spouse name: Not on file    Number of children: Not on file    Years of education: Not on file    Highest education level: Not on file   Occupational History    Not on file   Tobacco Use    Smoking status: Every Day     Current packs/day: 1.00     Average packs/day: 1 pack/day for 36.8 years (36.8 ttl pk-yrs)     Types: Cigarettes     Start date: 5/10/1988     Passive exposure: Past    Smokeless tobacco: Never   Vaping Use    Vaping status: Never Used   Substance and Sexual Activity    Alcohol use: Yes     Alcohol/week: 24.0 standard drinks of alcohol     Types: 24 Cans of beer per week     Comment: Drink mostly on weekends    Drug use: Yes     Frequency: 1.0 times per week     Types: Marijuana     Comment: Once a weekend    Sexual activity: Yes     Partners: Female     Birth control/protection: None   Other Topics Concern    Not on file   Social History Narrative    Not on file     Social Drivers of Health     Financial Resource Strain: Not on file   Food Insecurity: Not on file   Transportation Needs: Not on file   Physical Activity: Not on file   Stress: Not on file   Social Connections: Not on file   Intimate Partner Violence: Not on file   Housing Stability: Not on file       Current Outpatient Medications:     albuterol (PROVENTIL HFA,VENTOLIN HFA) 90 mcg/act inhaler, TAKE 2 PUFFS BY MOUTH EVERY 6 HOURS AS NEEDED FOR WHEEZE, Disp: 8.5 Inhaler, Rfl: 0    aspirin 81 mg chewable tablet, Chew 81 mg daily, Disp: , Rfl:     Multiple Vitamins-Minerals (MULTIVITAMIN WITH MINERALS) tablet, Take 1 tablet by mouth daily, Disp: , Rfl:     rosuvastatin (CRESTOR) 20 MG tablet, Take 1 tablet (20 mg total) by  mouth daily, Disp: 90 tablet, Rfl: 3    Varenicline Tartrate, Starter, (Chantix Starting Month ) 0.5 MG X 11 & 1 MG X 42 TBPK, 0.5 mg once daily for 3 days then 0.5mg twice daily for days 4-7 then 1 mg twice daily, Disp: 53 each, Rfl: 0  Allergies   Allergen Reactions    Penicillins      Vitals:    25 1508   BP: 112/74   Pulse: 77   Resp: 16   Temp: 98.4 °F (36.9 °C)   SpO2: 97%       Physical Exam  Constitutional: General appearance: The Patient is well-developed and well-nourished who appears the stated age in no acute distress. Patient is pleasant and talkative.     HEENT:  Normocephalic.  Sclerae are anicteric. Mucous membranes are moist. Neck is supple without adenopathy. No JVD.     Extremities: There is no clubbing or cyanosis. There is no edema.  Symmetric.  Neuro: Grossly nonfocal. Gait is normal.       Skin: Warm, anicteric.  Incision is C/D/I.  Psych:  Patient is pleasant and talkative.  Breasts:        Pathology:  Final Diagnosis   A. Skin, mid back:  SCAR SECONDARY TO PREVIOUS PROCEDURE     Note: There is no residual neoplasm in this multiply-sectioned specimen. There is a zone of uninvolved tissue on all margins, and the lesion appears completely excised. There is also suture granuloma.    Electronically signed by Bonilla Reyes MD on 2025 at 1519 EST       Labs:      Imaging  Diagnostic Sleep Study  Result Date: 2025  Narrative: Table formatting from the original result was not included. Images from the original result were not included. Diagnostic Report Patient Name: Balta Damico Patient Details: Male, 49 years  Patient #: [id number] : 1975 Referring Physician: Jessee Soliman MD Height: 71.0 in Interpreting Physician: Mayco Edge MD Weight: 206.0 lbs Study Date: 2025 B.M.I.: 28.7 STUDY FORMAT: The patient was admitted to the sleep laboratory at the [facility] of the [facility] and oriented to procedures and  equipment.  Data was obtained using the [employer] sleep monitoring system; Parameters monitored: EEG (frontal, central, occipital), EOG, EMG (chin and leg), ECG, body position, abdominal and thoracic respiratory effort, snoring, pulse oximetry, and airflow.  The sleep study was scored following the rules established by the American Academy of Sleep Medicine (AASM).  Hypopnea: event lasting >=10 seconds with a >=30% reduction in airflow amplitude and a >=4% decrease in SpO2. HISTORY: Balta Damico is a 50 y/o M with a PMH significant for HLD who presents for new patient evaluation of sleep disordered breathing. SLEEP: Patient slept for 292.0 minutes out of 429.6 minutes in bed representing a sleep efficiency of 68.0%.  Sleep architecture showed a sleep latency of 35.4 minutes and a REM sleep latency of 21.5 minutes.  There was 0.0% Stage N3 noted. There was 11.5% REM sleep noted. The patient had 215 arousals for an average of 44.2 arousals per hour of sleep. RESPIRATORY: There were a total of 265 respiratory events made up of 231 obstructive apneas, 0 mixed apneas, 0 central apneas, and 34 hypopneas resulting in an apnea/hypopnea index (AHI) of 54.5 events per hour of sleep.  The AHI in the supine position was N/A.  The AHI during REM sleep was 55.5. OXIMETRY: The baseline oxygen saturation with the patient awake was 95.0%.  The mean oxygen saturation throughout the study was 92.1%.  The amount of sleep time less than or equal to 89% was 60.8 minutes.  The lowest oxygen saturation was 74.0%. BODY POSITION: The patient slept 0.0% of the evening in the supine position, 28.3% on left side, 71.7% on right side, and 0.0%  in the prone position. LEG MOVEMENT: There were 36 PLMs; PLM index of 7.4; 2 PLMs associated with arousal; PLM w/arousal index of 0.4. SUMMARY:       TOTAL INDEX Apneas and Hypopneas 265 54.5 Central Apneas 0 0.0 Arousals 215 44.2 PLMs with arousal 2 0.4     Impression:   Severe obstructive sleep  apnea. Respiratory events were worse in REM sleep.. Normal baseline oxygen saturation with event related desaturations observed. Moderate to severe desaturations present with respiratory events. Sleep efficiency was low.. Stage N1 sleep  percentage (light sleep) was increased.. Stage N3 sleep (deep sleep) was absent.  REM sleep percentage was decreased on this test. The number of periodic movements during sleep was insignificant. Average pulse was normal .  EKG showed normal sinus rhythm. RECOMMENDATION: This diagnostic in lab sleep study revealed severe obstructive sleep apnea with an overall AHI of 54.5 events per hour.  Respiratory events were worse during REM sleep.  It should be noted that supine sleep was not captured during the study which may underestimate disease severity.  Significant hypoxia was present during the study.  The patient spent approximately 60.8 minutes with SpO2 under 89%.  Due to severity of sleep disordered breathing recommend CPAP titration study for further evaluation.  If CPAP titration study cannot be scheduled, recommend auto CPAP for treatment of sleep disordered breathing.  Follow-up with ordering clinician.  Sleep medicine consultation is recommended. Mayco Edge MD Board Eligible Sleep Physician Patient Name: Balta Damico Sex: Male Study Date: 2025 : 1975 Subject Code: [id number] Age: 49 Referring Physician: Jessee Soliman MD Height: 71.0 in Sleep Specialist: Mayco Edge MD Weight: 206.0 lbs Recording Tech: RAMON Pardo B.M.I.: 28.7 Scoring Tech: RAMON Pardo MEDICATIONS: RECORDING / COLLECTION NOTES: HISTORY AND PHYSICAL: Patient is a 49 year old male with complaints of choking and gasping during sleep. A diagnostic study is ordered. STAGES: EKG: NSR PLMS: Frequent leg movements. RESPIRATORY EVENTS: Severe TUCKER and hypopneas noted with moderate to loud snoring. SCORING / ANALYSIS NOTES:     I personally reviewed and interpreted  the above laboratory and imaging data.

## 2025-02-27 NOTE — LETTER
February 27, 2025     Lana Kendall,   [address]  [address]  [facility] [address]    Patient: Balta Damico   YOB: 1975   Date of Visit: 2/27/2025       Dear Dr. Anu Kendall:    Thank you for referring Balta Damico to me for evaluation. Below are my notes for this consultation.    If you have questions, please do not hesitate to call me. I look forward to following your patient along with you.         Sincerely,        Maikel Chin MD        CC: MD Maikel Trevino MD  2/27/2025  3:33 PM  Sign when Signing Visit               Surgical Oncology Follow Up       [facility] [place]  [address] [facility] [address]  [phone]    Balta Damico  1975  [id number]  [facility]  [address] [facility] [address]  [phone]    1. Malignant melanoma of torso excluding breast (HCC)  Assessment & Plan:  49-year-old male status post wide excision of a [id number] melanoma.  He was negative.  We will see him again in 1 week to remove the staples.  I will see him again in 1 year in follow-up of his melanoma. He is agreeable to this plan.  All his questions were answered.          Chief Complaint   Patient presents with   • Office Visit       Return in about 1 year (around 2/27/2026) for Ofice visit short, with Halima.      Oncology History   Malignant melanoma of torso excluding breast (HCC)   11/13/2024 Biopsy    Skin, mid upper back:     MELANOMA (thickness: 0.7 mm); melanoma in situ extends to peripheral specimen.     Synoptic report for melanoma of the skin  Thickness: 0.7 mm  Ulceration: not seen  Mitoses: 0/mm2  Margin assessment: melanoma in situ extends to peripheral specimen margin  Pathologic stage: pT1a       12/13/2024 -  Cancer Staged    Staging form: Melanoma of the Skin, AJCC 8th Edition  - Clinical:  Stage IA (cT1a, cN0, cM0) - Signed by Maikel Chin MD on 12/13/2024 2/13/2025 Surgery    Wide excision:  Skin, mid back:  SCAR SECONDARY TO PREVIOUS PROCEDURE         Staging: [id number] melanoma of the back, February 2025  Treatment history: Patient back melanoma, February 2025  Current treatment: Observation  Disease status: GRABIEL    History of Present Illness: Patient returns in follow-up.  He is doing well.  Pathology revealed no residual tumor.    Review of Systems  Complete ROS Surg Onc:   Complete ROS Surg Onc:   Constitutional: The patient denies new or recent history of general fatigue, no recent weight loss, no change in appetite.   Eyes: No complaints of visual problems, no scleral icterus.   ENT: no complaints of ear pain, no hoarseness, no difficulty swallowing,  no tinnitus and no new masses in head, oral cavity, or neck.   Cardiovascular: No complaints of chest pain, no palpitations, no ankle edema.   Respiratory: No complaints of shortness of breath, no cough.   Gastrointestinal: No complaints of jaundice, no bloody stools, no pale stools.   Genitourinary: No complaints of dysuria, no hematuria, no nocturia, no frequent urination, no urethral discharge.   Musculoskeletal: No complaints of weakness, paralysis, joint stiffness or arthralgias.  Integumentary: No complaints of rash, no new lesions.   Neurological: No complaints of convulsions, no seizures, no dizziness.   Hematologic/Lymphatic: No complaints of easy bruising.   Endocrine:  No hot or cold intolerance.  No polydipsia, polyphagia, or polyuria.  Allergy/immunology:  No environmental allergies.  No food allergies.  Not immunocompromised.  Skin:  No pallor or rash.  No wound.        Patient Active Problem List   Diagnosis   • Erectile dysfunction   • Hyperlipidemia   • Asthma   • Nicotine dependence   • Psoriasis   • Urinary frequency   • Annual physical exam   • Overweight (BMI 25.0-29.9)   • Elevated fasting blood sugar   • Tobacco abuse    • Neck pain   • Abnormal CBC   • Flu vaccine need   • Syncope and collapse   • Other chest pain   • Malignant melanoma of torso excluding breast (HCC)   • At risk for apnea   • Obstructive sleep apnea     Past Medical History:   Diagnosis Date   • Asthma    • Asthma 09/04/2012   • Hyperlipidemia    • Laceration of right middle finger without foreign body with damage to nail 04/01/2019   • Lightheadedness 01/14/2019   • Open displaced fracture of distal phalanx of right middle finger 04/01/2019   • Urinary frequency      Past Surgical History:   Procedure Laterality Date   • HERNIA REPAIR  1994     Family History   Problem Relation Age of Onset   • Mental illness Mother    • Heart disease Mother    • Multiple sclerosis Mother    • No Known Problems Father    • No Known Problems Brother    • No Known Problems Maternal Grandmother      Social History     Socioeconomic History   • Marital status: /Civil Union     Spouse name: Not on file   • Number of children: Not on file   • Years of education: Not on file   • Highest education level: Not on file   Occupational History   • Not on file   Tobacco Use   • Smoking status: Every Day     Current packs/day: 1.00     Average packs/day: 1 pack/day for 36.8 years (36.8 ttl pk-yrs)     Types: Cigarettes     Start date: 5/10/1988     Passive exposure: Past   • Smokeless tobacco: Never   Vaping Use   • Vaping status: Never Used   Substance and Sexual Activity   • Alcohol use: Yes     Alcohol/week: 24.0 standard drinks of alcohol     Types: 24 Cans of beer per week     Comment: Drink mostly on weekends   • Drug use: Yes     Frequency: 1.0 times per week     Types: Marijuana     Comment: Once a weekend   • Sexual activity: Yes     Partners: Female     Birth control/protection: None   Other Topics Concern   • Not on file   Social History Narrative   • Not on file     Social Drivers of Health     Financial Resource Strain: Not on file   Food Insecurity: Not on file    Transportation Needs: Not on file   Physical Activity: Not on file   Stress: Not on file   Social Connections: Not on file   Intimate Partner Violence: Not on file   Housing Stability: Not on file       Current Outpatient Medications:   •  albuterol (PROVENTIL HFA,VENTOLIN HFA) 90 mcg/act inhaler, TAKE 2 PUFFS BY MOUTH EVERY 6 HOURS AS NEEDED FOR WHEEZE, Disp: 8.5 Inhaler, Rfl: 0  •  aspirin 81 mg chewable tablet, Chew 81 mg daily, Disp: , Rfl:   •  Multiple Vitamins-Minerals (MULTIVITAMIN WITH MINERALS) tablet, Take 1 tablet by mouth daily, Disp: , Rfl:   •  rosuvastatin (CRESTOR) 20 MG tablet, Take 1 tablet (20 mg total) by mouth daily, Disp: 90 tablet, Rfl: 3  •  Varenicline Tartrate, Starter, (Chantix Starting Month Pak) 0.5 MG X 11 & 1 MG X 42 TBPK, 0.5 mg once daily for 3 days then 0.5mg twice daily for days 4-7 then 1 mg twice daily, Disp: 53 each, Rfl: 0  Allergies   Allergen Reactions   • Penicillins      Vitals:    02/27/25 1508   BP: 112/74   Pulse: 77   Resp: 16   Temp: 98.4 °F (36.9 °C)   SpO2: 97%       Physical Exam  Constitutional: General appearance: The Patient is well-developed and well-nourished who appears the stated age in no acute distress. Patient is pleasant and talkative.     HEENT:  Normocephalic.  Sclerae are anicteric. Mucous membranes are moist. Neck is supple without adenopathy. No JVD.     Extremities: There is no clubbing or cyanosis. There is no edema.  Symmetric.  Neuro: Grossly nonfocal. Gait is normal.       Skin: Warm, anicteric.  Incision is C/D/I.  Psych:  Patient is pleasant and talkative.  Breasts:        Pathology:  Final Diagnosis   A. Skin, mid back:  SCAR SECONDARY TO PREVIOUS PROCEDURE     Note: There is no residual neoplasm in this multiply-sectioned specimen. There is a zone of uninvolved tissue on all margins, and the lesion appears completely excised. There is also suture granuloma.    Electronically signed by Bonilla Reyes MD on 2/17/2025 at 1519 EST        Labs:      Imaging  Diagnostic Sleep Study  Result Date: 2025  Narrative: Table formatting from the original result was not included. Images from the original result were not included. Diagnostic Report Patient Name: Balta Damico Patient Details: Male, 49 years  Patient #: [id number] : 1975 Referring Physician: Jessee Soliman MD Height: 71.0 in Interpreting Physician: Mayco Edge MD Weight: 206.0 lbs Study Date: 2025 B.M.I.: 28.7 STUDY FORMAT: The patient was admitted to the sleep laboratory at the [facility] of the [facility] and oriented to procedures and equipment.  Data was obtained using the [employer] sleep monitoring system; Parameters monitored: EEG (frontal, central, occipital), EOG, EMG (chin and leg), ECG, body position, abdominal and thoracic respiratory effort, snoring, pulse oximetry, and airflow.  The sleep study was scored following the rules established by the American Academy of Sleep Medicine (AASM).  Hypopnea: event lasting >=10 seconds with a >=30% reduction in airflow amplitude and a >=4% decrease in SpO2. HISTORY: Balta Damico is a 50 y/o M with a PMH significant for HLD who presents for new patient evaluation of sleep disordered breathing. SLEEP: Patient slept for 292.0 minutes out of 429.6 minutes in bed representing a sleep efficiency of 68.0%.  Sleep architecture showed a sleep latency of 35.4 minutes and a REM sleep latency of 21.5 minutes.  There was 0.0% Stage N3 noted. There was 11.5% REM sleep noted. The patient had 215 arousals for an average of 44.2 arousals per hour of sleep. RESPIRATORY: There were a total of 265 respiratory events made up of 231 obstructive apneas, 0 mixed apneas, 0 central apneas, and 34 hypopneas resulting in an apnea/hypopnea index (AHI) of 54.5 events per hour of sleep.  The AHI in the supine position was N/A.  The AHI during REM sleep was 55.5. OXIMETRY: The baseline oxygen saturation  with the patient awake was 95.0%.  The mean oxygen saturation throughout the study was 92.1%.  The amount of sleep time less than or equal to 89% was 60.8 minutes.  The lowest oxygen saturation was 74.0%. BODY POSITION: The patient slept 0.0% of the evening in the supine position, 28.3% on left side, 71.7% on right side, and 0.0%  in the prone position. LEG MOVEMENT: There were 36 PLMs; PLM index of 7.4; 2 PLMs associated with arousal; PLM w/arousal index of 0.4. SUMMARY:       TOTAL INDEX Apneas and Hypopneas 265 54.5 Central Apneas 0 0.0 Arousals 215 44.2 PLMs with arousal 2 0.4     Impression:   Severe obstructive sleep apnea. Respiratory events were worse in REM sleep.. Normal baseline oxygen saturation with event related desaturations observed. Moderate to severe desaturations present with respiratory events. Sleep efficiency was low.. Stage N1 sleep  percentage (light sleep) was increased.. Stage N3 sleep (deep sleep) was absent.  REM sleep percentage was decreased on this test. The number of periodic movements during sleep was insignificant. Average pulse was normal .  EKG showed normal sinus rhythm. RECOMMENDATION: This diagnostic in lab sleep study revealed severe obstructive sleep apnea with an overall AHI of 54.5 events per hour.  Respiratory events were worse during REM sleep.  It should be noted that supine sleep was not captured during the study which may underestimate disease severity.  Significant hypoxia was present during the study.  The patient spent approximately 60.8 minutes with SpO2 under 89%.  Due to severity of sleep disordered breathing recommend CPAP titration study for further evaluation.  If CPAP titration study cannot be scheduled, recommend auto CPAP for treatment of sleep disordered breathing.  Follow-up with ordering clinician.  Sleep medicine consultation is recommended. Mayco Edge MD Board Eligible Sleep Physician Patient Name: Balta Damico Sex: Male Study Date: 2/4/2025  : 1975 Subject Code: [id number] Age: 49 Referring Physician: Jessee Soliman MD Height: 71.0 in Sleep Specialist: Mayco Edge MD Weight: 206.0 lbs Recording Tech: RAMON Pardo B.M.I.: 28.7 Scoring Tech: RAMON Pardo MEDICATIONS: RECORDING / COLLECTION NOTES: HISTORY AND PHYSICAL: Patient is a 49 year old male with complaints of choking and gasping during sleep. A diagnostic study is ordered. STAGES: EKG: NSR PLMS: Frequent leg movements. RESPIRATORY EVENTS: Severe TUCKER and hypopneas noted with moderate to loud snoring. SCORING / ANALYSIS NOTES:     I personally reviewed and interpreted the above laboratory and imaging data.

## 2025-02-27 NOTE — PATIENT INSTRUCTIONS
Nurse visit with Caryn on March 5th at 4:00 at the [facility].   Go to check in and ask for Caryn.

## 2025-05-01 ENCOUNTER — OFFICE VISIT (OUTPATIENT)
Dept: SLEEP CENTER | Facility: CLINIC | Age: 50
End: 2025-05-01
Payer: COMMERCIAL

## 2025-05-01 VITALS
BODY MASS INDEX: 27.72 KG/M2 | WEIGHT: 198 LBS | OXYGEN SATURATION: 96 % | DIASTOLIC BLOOD PRESSURE: 84 MMHG | HEIGHT: 71 IN | SYSTOLIC BLOOD PRESSURE: 122 MMHG | HEART RATE: 66 BPM

## 2025-05-01 DIAGNOSIS — G47.19 EXCESSIVE DAYTIME SLEEPINESS: ICD-10-CM

## 2025-05-01 DIAGNOSIS — Z72.0 TOBACCO ABUSE: ICD-10-CM

## 2025-05-01 DIAGNOSIS — R35.1 NOCTURIA: ICD-10-CM

## 2025-05-01 DIAGNOSIS — G47.33 OSA (OBSTRUCTIVE SLEEP APNEA): Primary | ICD-10-CM

## 2025-05-01 PROCEDURE — 99204 OFFICE O/P NEW MOD 45 MIN: CPT | Performed by: STUDENT IN AN ORGANIZED HEALTH CARE EDUCATION/TRAINING PROGRAM

## 2025-05-01 NOTE — PROGRESS NOTES
Name: Balta Damico      : 1975      MRN: [id number]  Encounter Provider: Mayco Edge MD  Encounter Date: 2025   Encounter department: [facility] SLEEP MEDICINE [facility]    :  Assessment & Plan  TUCKER (obstructive sleep apnea)  Severe Obstructive Sleep Apnea - Discussed pathophysiology of TUCKER, consequences of untreated TUCKER and treatment options including PAP therapy, mandibular advancement device, positional therapy, and surgical referral. - Discussed risks of sleepy driving and mitigation strategies (napping). Patient agrees to not drive if tired or sleepy. - Advised avoidance of alcohol and centrally acting medications as these can worsen TUCKER.  - Balta presents today for new patient evaluation of snoring, choking, gasping, unrefreshing sleep quality, airway crowding on exam.  We reviewed his recent diagnostic sleep study together in detail which revealed severe obstructive sleep apnea.  We discussed treatment options for severe obstructive sleep apnea including auto CPAP therapy.  - Auto CPAP order has been placed today in the office.  I have asked him to use CPAP with all periods of sleep including naps.  We reviewed insurance compliance including minimum use of CPAP for 4 hours nightly for at least 70% of nights.  - I have asked him to return within 3 months for close clinical follow-up and initial CPAP compliance visit.  Patient verbalized understanding and stated that he would do so.         Tobacco abuse  We reviewed the impact of tobacco use and cigarette use on upper airway swelling and edema.  We reviewed that upper airway swelling and edema can worsen sleep disordered breathing and overall sleep quality.  I have counseled the patient on smoking mitigation as well as cessation.  Patient remains precontemplative at this time.         Excessive daytime sleepiness  The differential diagnosis for excessive daytime sleepiness is broad.  It includes insufficient sleep, medical and  "psychiatric conditions, neurologic conditions, central disorders of hypersomnolence, or primary sleep disordered breathing.  The patient's symptoms of excessive daytime sleepiness may be secondary to untreated sleep disordered breathing.  For auto CPAP start as above.         Nocturia  Patient with 1-2 episodes of nocturia nightly.  We discussed and reviewed that excess nocturia may be secondary to untreated sleep disordered breathing.  For auto CPAP start as above.         History of Present Illness       Sitting and reading: (Proxy-Rptd) Slight chance of dozing  Watching TV: (Proxy-Rptd) Moderate chance of dozing  Sitting, inactive in a public place (e.g. a theatre or a meeting): (Proxy-Rptd) Would never doze  As a passenger in a car for an hour without a break: (Proxy-Rptd) High chance of dozing  Lying down to rest in the afternoon when circumstances permit: (Proxy-Rptd) High chance of dozing  Sitting and talking to someone: (Proxy-Rptd) Slight chance of dozing  Sitting quietly after a lunch without alcohol: (Proxy-Rptd) Would never doze  In a car, while stopped for a few minutes in traffic: (Proxy-Rptd) Would never doze  Total score: (Proxy-Rptd) 10       Pertinent positives/negatives included in HPI and also as noted:       Objective   /84   Pulse 66   Ht 5' 11\" (1.803 m)   Wt 89.8 kg (198 lb)   SpO2 96%   BMI 27.62 kg/m²     Neck Circumference: 17  [facility] New Patient Evaluation    Mr. Damico is a 49 y.o. male with a PMH of tobacco use disorder, severe obstructive sleep apnea, hyperlipidemia, mild intermittent asthma, melanoma s/p resection, who presents as a new patient for evaluation of Sleep Disordered Breathing.     I have reviewed the 1/16/25 cardiology office note with Jessee Soliman MD.     I have reviewed the 11/7/24 family medicine office note with Lana Kendall DO.    History of Presenting Illness:    He was accompanied by his spouse who helped contribute to " the history today in the office.    The patient snores. There are choking and gasping episodes. There are witnessed apneas. 1-2x episode(s) of nocturia occur per night. The patient sleeps on his side and back. There are no reports of nocturnal behaviors.     The patient's [place] sleepiness scale score is 10/24 (<=10 is normal). He has been been sleepy while driving. He has not had a fall-asleep motor vehicle accident. There are no reports of hypnagogic hallucinations, cataplexy or sleep paralysis.    In terms of the patient's sleep/wake cycle symptoms:  Bedtime: 8-8:45 PM  SOL: 15-30 minutes  Nocturnal awakenings: 1-2 X, nocturia  Wakeup time: 3:45   Naps: Denied    Total sleep time estimate: Approximately 6 hours.     On weekends he will fall asleep around 10 PM and sleep until 7:30 AM.    He has tried Melatonin for poor sleep in the past.    His legs occasionally do bother him on trying to initiate sleep.    He recently underwent a diagnostic polysomnogram on 2/4/2025 at a weight of 206 pounds.  This diagnostic sleep study revealed severe obstructive sleep apnea with an overall AHI of 54.5 events per hour.  O2 kamla of 74%.  Significant hypoxia was present during the study as the patient spent 60 minutes with SpO2 under 89%.    Diagnostic sleep study results were reviewed in detail today in the office with the patient.    Past Medical History:   Diagnosis Date    Asthma     Asthma 09/04/2012    Hyperlipidemia     Laceration of right middle finger without foreign body with damage to nail 04/01/2019    Lightheadedness 01/14/2019    Open displaced fracture of distal phalanx of right middle finger 04/01/2019    Urinary frequency         Past Surgical History:   Procedure Laterality Date    HERNIA REPAIR  1994        No prior upper airway surgeries.     Allergies   Allergen Reactions    Penicillins         Current Outpatient Medications on File Prior to Visit   Medication Sig Dispense Refill    albuterol (PROVENTIL  HFA,VENTOLIN HFA) 90 mcg/act inhaler TAKE 2 PUFFS BY MOUTH EVERY 6 HOURS AS NEEDED FOR WHEEZE 8.5 Inhaler 0    aspirin 81 mg chewable tablet Chew 81 mg daily      Multiple Vitamins-Minerals (MULTIVITAMIN WITH MINERALS) tablet Take 1 tablet by mouth daily      rosuvastatin (CRESTOR) 20 MG tablet Take 1 tablet (20 mg total) by mouth daily 90 tablet 3    Varenicline Tartrate, Starter, (Chantix Starting Month Pak) 0.5 MG X 11 & 1 MG X 42 TBPK 0.5 mg once daily for 3 days then 0.5mg twice daily for days 4-7 then 1 mg twice daily (Patient not taking: Reported on 5/1/2025) 53 each 0     No current facility-administered medications on file prior to visit.       Family History: His family history includes Heart disease in his mother; Mental illness in his mother; Multiple sclerosis in his mother; No Known Problems in his brother, father, and maternal grandmother.    No known family history of sleep disorders.     Social History:   Job: Industrial Painting  Caffeine: 2 Cups of Coffee Daily  Alcohol: Social Alcohol Use  Drugs: Denied  Tobacco: Tobacco Use Disorder  Vape: Denied    Patient's medications, allergies, past medical, surgical, social and family histories were reviewed in the electronic medical record and updated as appropriate.    Review of Systems: On review of systems, the patient reports: No AM Headaches,  occasional nasal sinus congestion, does wake with dry mouth and dry throat in morning.    Vitals:    05/01/25 0916   BP: 122/84   Pulse: 66   SpO2: 96%       Physical Examination:  Gen: No acute distress, not visibly anxious, speaking comfortably  H&N: MM Iv; no retro/micrognathia; no macroglossia; no visible thyromegaly  Neuro: Alert and oriented x3, interactive  Psych: Pleasant, normal affect  Skin: No visible rashes  Respiratory: No accessory muscle use, breathing comfortably  Cardiac: No visible edema of extremities  Abdomen: Soft, NT, nondistended  Musculoskeletal: Normal ROM Intact of Extremities    Study  "Results:  I reviewed the following labs:    No results found for: \"FERRITIN\"    Lab Results   Component Value Date    WBC 8.57 09/01/2024    HGB 14.6 09/01/2024    HCT 42.3 09/01/2024    MCV 98 09/01/2024     09/01/2024       This SmartLink has not been configured with any valid records.       Lab Results   Component Value Date    SODIUM 143 09/01/2024    K 3.6 09/01/2024     (H) 09/01/2024    CO2 20 (L) 09/01/2024    BUN 13 09/01/2024    CREATININE 0.84 09/01/2024    GLUC 109 09/01/2024    CALCIUM 9.5 09/01/2024       This SmartLink has not been configured with any valid records.       Lab Results   Component Value Date    JHG7VBUOMNEN 2.54 04/26/2017          Results/Data:  I have reviewed the results and report from the 9/1/2024 chest x-ray.    Independent Findings Reviewed: No acute cardiopulmonary disease.    I have reviewed the results and report from the 10/7/2024 transthoracic echocardiogram: Left Ventricle: Left ventricular cavity size is normal. Wall thickness is normal. The left ventricular ejection fraction is 55-60%. Systolic function is normal. Wall motion is normal. Diastolic function is normal.     Independent Findings Reviewed: Normal left ventricular ejection fraction.  No wall motion abnormalities.    I answered the patient's questions to the best of my ability. We will continue with longitudinal follow-up for evaluation of sleep disordered breathing. Follow-up will be after sleep testing is completed.    Mayco Edge MD  Sleep Medicine and Internal Medicine  [facility]  05/01/25    I have spent a total time of 40 minutes in caring for this patient on the day of the visit/encounter including Diagnostic results, Prognosis, Risks and benefits of tx options, Instructions for management, Patient and family education, Importance of tx compliance, Risk factor reductions, Documenting in the medical record, Reviewing/placing orders in the medical record " "(including tests, medications, and/or procedures), and Obtaining or reviewing history  .        Portions of the record may have been created with voice recognition software.  Occasional wrong word or \"sound a like\" substitutions may have occurred due to the inherent limitations of voice recognition software.  Read the chart carefully and recognize, using context, where substitutions have occurred.       "

## 2025-05-01 NOTE — ASSESSMENT & PLAN NOTE
The differential diagnosis for excessive daytime sleepiness is broad.  It includes insufficient sleep, medical and psychiatric conditions, neurologic conditions, central disorders of hypersomnolence, or primary sleep disordered breathing.  The patient's symptoms of excessive daytime sleepiness may be secondary to untreated sleep disordered breathing.  For auto CPAP start as above.

## 2025-05-01 NOTE — ASSESSMENT & PLAN NOTE
We reviewed the impact of tobacco use and cigarette use on upper airway swelling and edema.  We reviewed that upper airway swelling and edema can worsen sleep disordered breathing and overall sleep quality.  I have counseled the patient on smoking mitigation as well as cessation.  Patient remains precontemplative at this time.

## 2025-05-01 NOTE — LETTER
May 1, 2025     Lana Kendall DO  [address]  [address]  [place] [address]    Patient: Balta Damico   YOB: 1975   Date of Visit: 2025       Dear Dr. Lana Kendall, DO:    Thank you for referring Balta Damico to me for evaluation. Below are my notes for this consultation.    If you have questions, please do not hesitate to call me. I look forward to following your patient along with you.         Sincerely,        Mayco Edge MD        CC: No Recipients    Mayco Edge MD  2025 10:40 AM  Sign when Signing Visit  Name: Balta Damico      : 1975      MRN: [id number]  Encounter Provider: Mayco Edge MD  Encounter Date: 2025   Encounter department: [facility] SLEEP MEDICINE [facility]    :  Assessment & Plan  TUCKER (obstructive sleep apnea)  Severe Obstructive Sleep Apnea - Discussed pathophysiology of TUCKER, consequences of untreated TUCKER and treatment options including PAP therapy, mandibular advancement device, positional therapy, and surgical referral. - Discussed risks of sleepy driving and mitigation strategies (napping). Patient agrees to not drive if tired or sleepy. - Advised avoidance of alcohol and centrally acting medications as these can worsen TUCKER.  - Balta presents today for new patient evaluation of snoring, choking, gasping, unrefreshing sleep quality, airway crowding on exam.  We reviewed his recent diagnostic sleep study together in detail which revealed severe obstructive sleep apnea.  We discussed treatment options for severe obstructive sleep apnea including auto CPAP therapy.  - Auto CPAP order has been placed today in the office.  I have asked him to use CPAP with all periods of sleep including naps.  We reviewed insurance compliance including minimum use of CPAP for 4 hours nightly for at least 70% of nights.  - I have asked him to return within 3 months for close clinical follow-up and  "initial CPAP compliance visit.  Patient verbalized understanding and stated that he would do so.         Tobacco abuse  We reviewed the impact of tobacco use and cigarette use on upper airway swelling and edema.  We reviewed that upper airway swelling and edema can worsen sleep disordered breathing and overall sleep quality.  I have counseled the patient on smoking mitigation as well as cessation.  Patient remains precontemplative at this time.         Excessive daytime sleepiness  The differential diagnosis for excessive daytime sleepiness is broad.  It includes insufficient sleep, medical and psychiatric conditions, neurologic conditions, central disorders of hypersomnolence, or primary sleep disordered breathing.  The patient's symptoms of excessive daytime sleepiness may be secondary to untreated sleep disordered breathing.  For auto CPAP start as above.         Nocturia  Patient with 1-2 episodes of nocturia nightly.  We discussed and reviewed that excess nocturia may be secondary to untreated sleep disordered breathing.  For auto CPAP start as above.         History of Present Illness       Sitting and reading: (Proxy-Rptd) Slight chance of dozing  Watching TV: (Proxy-Rptd) Moderate chance of dozing  Sitting, inactive in a public place (e.g. a theatre or a meeting): (Proxy-Rptd) Would never doze  As a passenger in a car for an hour without a break: (Proxy-Rptd) High chance of dozing  Lying down to rest in the afternoon when circumstances permit: (Proxy-Rptd) High chance of dozing  Sitting and talking to someone: (Proxy-Rptd) Slight chance of dozing  Sitting quietly after a lunch without alcohol: (Proxy-Rptd) Would never doze  In a car, while stopped for a few minutes in traffic: (Proxy-Rptd) Would never doze  Total score: (Proxy-Rptd) 10       Pertinent positives/negatives included in HPI and also as noted:       Objective   /84   Pulse 66   Ht 5' 11\" (1.803 m)   Wt 89.8 kg (198 lb)   SpO2 96%   BMI " 27.62 kg/m²     Neck Circumference: 17  [facility] New Patient Evaluation    Mr. Damico is a 49 y.o. male with a PMH of tobacco use disorder, severe obstructive sleep apnea, hyperlipidemia, mild intermittent asthma, melanoma s/p resection, who presents as a new patient for evaluation of Sleep Disordered Breathing.     I have reviewed the 1/16/25 cardiology office note with Jessee Soliman MD.     I have reviewed the 11/7/24 family medicine office note with Lana Kendall DO.    History of Presenting Illness:    He was accompanied by his spouse who helped contribute to the history today in the office.    The patient snores. There are choking and gasping episodes. There are witnessed apneas. 1-2x episode(s) of nocturia occur per night. The patient sleeps on his side and back. There are no reports of nocturnal behaviors.     The patient's [place] sleepiness scale score is 10/24 (<=10 is normal). He has been been sleepy while driving. He has not had a fall-asleep motor vehicle accident. There are no reports of hypnagogic hallucinations, cataplexy or sleep paralysis.    In terms of the patient's sleep/wake cycle symptoms:  Bedtime: 8-8:45 PM  SOL: 15-30 minutes  Nocturnal awakenings: 1-2 X, nocturia  Wakeup time: 3:45   Naps: Denied    Total sleep time estimate: Approximately 6 hours.     On weekends he will fall asleep around 10 PM and sleep until 7:30 AM.    He has tried Melatonin for poor sleep in the past.    His legs occasionally do bother him on trying to initiate sleep.    He recently underwent a diagnostic polysomnogram on 2/4/2025 at a weight of 206 pounds.  This diagnostic sleep study revealed severe obstructive sleep apnea with an overall AHI of 54.5 events per hour.  O2 kamla of 74%.  Significant hypoxia was present during the study as the patient spent 60 minutes with SpO2 under 89%.    Diagnostic sleep study results were reviewed in detail today in the office with the  patient.    Past Medical History:   Diagnosis Date   • Asthma    • Asthma 09/04/2012   • Hyperlipidemia    • Laceration of right middle finger without foreign body with damage to nail 04/01/2019   • Lightheadedness 01/14/2019   • Open displaced fracture of distal phalanx of right middle finger 04/01/2019   • Urinary frequency         Past Surgical History:   Procedure Laterality Date   • HERNIA REPAIR  1994        No prior upper airway surgeries.     Allergies   Allergen Reactions   • Penicillins         Current Outpatient Medications on File Prior to Visit   Medication Sig Dispense Refill   • albuterol (PROVENTIL HFA,VENTOLIN HFA) 90 mcg/act inhaler TAKE 2 PUFFS BY MOUTH EVERY 6 HOURS AS NEEDED FOR WHEEZE 8.5 Inhaler 0   • aspirin 81 mg chewable tablet Chew 81 mg daily     • Multiple Vitamins-Minerals (MULTIVITAMIN WITH MINERALS) tablet Take 1 tablet by mouth daily     • rosuvastatin (CRESTOR) 20 MG tablet Take 1 tablet (20 mg total) by mouth daily 90 tablet 3   • Varenicline Tartrate, Starter, (Chantix Starting Month Pak) 0.5 MG X 11 & 1 MG X 42 TBPK 0.5 mg once daily for 3 days then 0.5mg twice daily for days 4-7 then 1 mg twice daily (Patient not taking: Reported on 5/1/2025) 53 each 0     No current facility-administered medications on file prior to visit.       Family History: His family history includes Heart disease in his mother; Mental illness in his mother; Multiple sclerosis in his mother; No Known Problems in his brother, father, and maternal grandmother.    No known family history of sleep disorders.     Social History:   Job: Industrial Painting  Caffeine: 2 Cups of Coffee Daily  Alcohol: Social Alcohol Use  Drugs: Denied  Tobacco: Tobacco Use Disorder  Vape: Denied    Patient's medications, allergies, past medical, surgical, social and family histories were reviewed in the electronic medical record and updated as appropriate.    Review of Systems: On review of systems, the patient reports: No AM  "Headaches,  occasional nasal sinus congestion, does wake with dry mouth and dry throat in morning.    Vitals:    05/01/25 0916   BP: 122/84   Pulse: 66   SpO2: 96%       Physical Examination:  Gen: No acute distress, not visibly anxious, speaking comfortably  H&N: MM Iv; no retro/micrognathia; no macroglossia; no visible thyromegaly  Neuro: Alert and oriented x3, interactive  Psych: Pleasant, normal affect  Skin: No visible rashes  Respiratory: No accessory muscle use, breathing comfortably  Cardiac: No visible edema of extremities  Abdomen: Soft, NT, nondistended  Musculoskeletal: Normal ROM Intact of Extremities    Study Results:  I reviewed the following labs:    No results found for: \"FERRITIN\"    Lab Results   Component Value Date    WBC 8.57 09/01/2024    HGB 14.6 09/01/2024    HCT 42.3 09/01/2024    MCV 98 09/01/2024     09/01/2024       This SmartLink has not been configured with any valid records.       Lab Results   Component Value Date    SODIUM 143 09/01/2024    K 3.6 09/01/2024     (H) 09/01/2024    CO2 20 (L) 09/01/2024    BUN 13 09/01/2024    CREATININE 0.84 09/01/2024    GLUC 109 09/01/2024    CALCIUM 9.5 09/01/2024       This SmartLink has not been configured with any valid records.       Lab Results   Component Value Date    [id number] 2.54 04/26/2017          Results/Data:  I have reviewed the results and report from the 9/1/2024 chest x-ray.    Independent Findings Reviewed: No acute cardiopulmonary disease.    I have reviewed the results and report from the 10/7/2024 transthoracic echocardiogram: Left Ventricle: Left ventricular cavity size is normal. Wall thickness is normal. The left ventricular ejection fraction is 55-60%. Systolic function is normal. Wall motion is normal. Diastolic function is normal.     Independent Findings Reviewed: Normal left ventricular ejection fraction.  No wall motion abnormalities.    I answered the patient's questions to the best of my ability. We " "will continue with longitudinal follow-up for evaluation of sleep disordered breathing. Follow-up will be after sleep testing is completed.    Mayco Edge MD  Sleep Medicine and Internal Medicine  [facility]  05/01/25    I have spent a total time of 40 minutes in caring for this patient on the day of the visit/encounter including Diagnostic results, Prognosis, Risks and benefits of tx options, Instructions for management, Patient and family education, Importance of tx compliance, Risk factor reductions, Documenting in the medical record, Reviewing/placing orders in the medical record (including tests, medications, and/or procedures), and Obtaining or reviewing history  .        Portions of the record may have been created with voice recognition software.  Occasional wrong word or \"sound a like\" substitutions may have occurred due to the inherent limitations of voice recognition software.  Read the chart carefully and recognize, using context, where substitutions have occurred.       "

## 2025-05-01 NOTE — ASSESSMENT & PLAN NOTE
Severe Obstructive Sleep Apnea - Discussed pathophysiology of TUCKER, consequences of untreated TUCKER and treatment options including PAP therapy, mandibular advancement device, positional therapy, and surgical referral. - Discussed risks of sleepy driving and mitigation strategies (napping). Patient agrees to not drive if tired or sleepy. - Advised avoidance of alcohol and centrally acting medications as these can worsen TUCKER.  - Balta presents today for new patient evaluation of snoring, choking, gasping, unrefreshing sleep quality, airway crowding on exam.  We reviewed his recent diagnostic sleep study together in detail which revealed severe obstructive sleep apnea.  We discussed treatment options for severe obstructive sleep apnea including auto CPAP therapy.  - Auto CPAP order has been placed today in the office.  I have asked him to use CPAP with all periods of sleep including naps.  We reviewed insurance compliance including minimum use of CPAP for 4 hours nightly for at least 70% of nights.  - I have asked him to return within 3 months for close clinical follow-up and initial CPAP compliance visit.  Patient verbalized understanding and stated that he would do so.

## 2025-05-01 NOTE — PATIENT INSTRUCTIONS
"Starting CPAP    1.  The home medical company will give you a call within the next 2 weeks to set up your new CPAP machine.  You will be able to be set up at multiple locations.  Pick the one that is easiest for your schedule and attend that appointment to get started on CPAP.    2.  You will be initially prescribed a mask interface to use with the CPAP machine.  Try the CPAP machine for the first 7 to 10 days, if after that time you are having difficulty adjusting to CPAP please call the [employer] company and they will allow you to switch out the mask multiple times during the first month.    3.  Start using CPAP at night or during the day to acclimate to using a mask interface.  Continue to use regularly until you are seen in follow-up.  Reminder that insurance companies will ask for you to use the machine for at least 4 hours in a 24-hour period for at least 70% of days.  Please make sure you are using the machine regularly so we can assess your compliance and follow-up and so you meet insurance compliance rules.    4.  When you are seen in follow-up, we will make adjustments to the CPAP machine as necessary to make it more comfortable for you.  There are alternative mask, pressure changes, humidification changes, and alternative ways we can make CPAP easier and more tolerable for you at night.    Patient Education     Sleep apnea in adults   The Basics   Written by the doctors and editors at [facility]   What is sleep apnea? -- Sleep apnea is a condition that makes you stop breathing for short periods while you are asleep. There are 2 types of sleep apnea. One is called \"obstructive sleep apnea.\" The other is called \"central sleep apnea.\"  In obstructive sleep apnea, you stop breathing because your throat narrows or closes (figure 1). In central sleep apnea, you stop breathing because your brain does not send the right signals to your muscles to make you breathe. When people talk about sleep apnea, they are " "usually referring to obstructive sleep apnea, which is what this article is about.  People with sleep apnea do not know that they stop breathing when they are asleep. But they do sometimes wake up startled or gasping for breath. They also often hear from loved ones that they snore.  What are the symptoms of sleep apnea? -- The main symptoms of sleep apnea are loud snoring, tiredness, and daytime sleepiness. Other symptoms can include:   Restless sleep   Waking up choking or gasping   Morning headaches, dry mouth, or sore throat   Waking up often to urinate   Waking up feeling unrested or groggy   Trouble thinking clearly or remembering things  Some people with sleep apnea don't have symptoms, or don't realize that they have them.  Should I see a doctor or nurse? -- Yes. If you think that you might have sleep apnea, see your doctor.  Is there a test for sleep apnea? -- Yes. First, your doctor or nurse will ask about your symptoms. If you have a bed partner, they might also ask that person if you snore or gasp in your sleep. If the doctor or nurse suspects that you have sleep apnea, they might send you for a \"sleep study.\"  Sleep studies can sometimes be done at home, but they are usually done in a sleep lab. For the study, you spend the night in the lab, and you are hooked up to different machines that monitor your heart rate, breathing, and other body functions. The results of the test tell your doctor or nurse if you have the disorder.  Is there anything I can do on my own to help my sleep apnea? -- Yes. Some things that might help:   Try to avoid sleeping on your back, if possible. This might help some people.   Lose weight, if you have excess body weight.   Get regular physical activity. This might help you lose weight. But even if it doesn't, being active is good for your health.   Avoid alcohol, especially in the evening. Alcohol can make sleep apnea worse.  How is sleep apnea treated? -- Treatment can " "include:   Weight loss - As mentioned above, weight loss can help if you have excess weight or obesity. But losing weight can be challenging, and it takes time to lose enough weight to help with your sleep apnea. Most people need other treatment while they work on losing weight.   CPAP - The most effective treatment for sleep apnea is a device that keeps your airway open while you sleep. Treatment with this device is called \"continuous positive airway pressure\" (\"CPAP\"). People getting CPAP wear a face mask at night that keeps them breathing (figure 2).  If your doctor or nurse recommends a CPAP machine, be patient about using it. The mask might seem uncomfortable to wear at first, and the machine might seem noisy, but using the machine can really help you. People with sleep apnea who use a CPAP machine feel more rested and generally feel better.  If CPAP does not work, your doctor might suggest other treatment. Options might include:   An oral device - This is a device that you wear in your mouth. It is called an \"oral appliance\" or \"mandibular advancement device.\" It helps keep your airway open while you sleep.   Hypoglossal nerve stimulation - This involves a procedure to implant a small device into your chest. The device has a wire that connects to the nerve under your tongue. While you are sleeping, it sends an electrical signal that causes the tongue to push forward. This helps open up your airway.   Surgery to widen your airway - This might involve removing your tonsils or other tissue that blocks the airway.  Is sleep apnea dangerous? -- It can be. Risks include:   Accidents - People with sleep apnea do not get good-quality sleep, so they are often tired and not alert. This puts them at risk for car accidents and other types of accidents.   Other health problems - Studies show that people with sleep apnea are more likely than others to have high blood pressure, heart attacks, and other serious heart " problems. Some people also have mood changes or depression.  In people with severe sleep apnea, getting treated (for example, with CPAP) can help lower these risks.  All topics are updated as new evidence becomes available and our peer review process is complete.  This topic retrieved from [employer] on: Feb 28, 2024.  Topic 78772 Version 18.0  Release: 32.2.4 - C32.58  © 2024 Up[employer]Date, Inc. and/or its affiliates. All rights reserved.  figure 1: Airway in a person with sleep apnea     Normally, when a person sleeps, the airway remains open, and air can pass from the nose and mouth to the lungs. In a person with sleep apnea, parts of the throat and mouth drop into the airway and block off the flow of air. This can cause loud snoring and interrupt breathing for short periods.  Graphic 10089 Version 6.0  figure 2: Continuous positive airway pressure (CPAP) for sleep apnea     The CPAP mask gently blows air into your nose while you sleep. It puts just enough pressure on your airway to keep it from closing. The mask in this picture fits over just the nose. Other CPAP devices have masks that fit over the nose and mouth.  Graphic 14972 Version 5.0  Consumer Information Use and Disclaimer   Disclaimer: This generalized information is a limited summary of diagnosis, treatment, and/or medication information. It is not meant to be comprehensive and should be used as a tool to help the user understand and/or assess potential diagnostic and treatment options. It does NOT include all information about conditions, treatments, medications, side effects, or risks that may apply to a specific patient. It is not intended to be medical advice or a substitute for the medical advice, diagnosis, or treatment of a health care provider based on the health care provider's examination and assessment of a patient's specific and unique circumstances. Patients must speak with a health care provider for complete information about their health,  medical questions, and treatment options, including any risks or benefits regarding use of medications. This information does not endorse any treatments or medications as safe, effective, or approved for treating a specific patient. Up[employer], Inc[employer] and its affiliates disclaim any warranty or liability relating to this information or the use thereof.The use of this information is governed by the Terms of Use, available at https://www.[employer].com/en/know/clinical-effectiveness-terms. 2024© UpToDate, Inc. and its affiliates and/or licensors. All rights reserved.  Copyright   © 2024 Up[employer]Date, Inc. and/or its affiliates. All rights reserved.

## 2025-06-24 ENCOUNTER — TELEPHONE (OUTPATIENT)
Dept: SLEEP CENTER | Facility: CLINIC | Age: 50
End: 2025-06-24

## 2025-06-24 ENCOUNTER — TELEPHONE (OUTPATIENT)
Age: 50
End: 2025-06-24

## 2025-06-24 LAB
DME PARACHUTE DELIVERY DATE REQUESTED: NORMAL
DME PARACHUTE ITEM DESCRIPTION: NORMAL
DME PARACHUTE ORDER STATUS: NORMAL
DME PARACHUTE SUPPLIER NAME: NORMAL
DME PARACHUTE SUPPLIER PHONE: NORMAL

## 2025-06-24 NOTE — TELEPHONE ENCOUNTER
Pt called hasn't received his CPAP machine yet pt called [facility] no order has been put in for his machine

## 2025-06-24 NOTE — TELEPHONE ENCOUNTER
Per chart, patient was already added to [employer] secure chat to have CPAP RX processed and was scheduled for compliance follow up 9/11/25.

## 2025-06-24 NOTE — TELEPHONE ENCOUNTER
6/24/25 RECEIVED CALL FROM PT WIFE MADALYN, THAT [employer] [employer] STATES THEY NEVER RECEIVED FAX FOR C PAP MACHINE SCRIPT. REFAXED SCRIPT TO [employer] [phone] PER WIFE. ADVISED ON FAX FOR PT WIFE TO BE CALLED ONCE SCRIPT RECEIVED. ADVISED MADALYN TO CALL US BACK WITH ANY ISSUES.

## 2025-07-03 LAB

## 2025-07-07 LAB

## 2025-08-09 ENCOUNTER — HOSPITAL ENCOUNTER (EMERGENCY)
Facility: HOSPITAL | Age: 50
Discharge: HOME | End: 2025-08-09
Attending: STUDENT IN AN ORGANIZED HEALTH CARE EDUCATION/TRAINING PROGRAM
Payer: COMMERCIAL

## 2025-08-11 ENCOUNTER — TELEPHONE (OUTPATIENT)
Dept: PHYSICAL THERAPY | Facility: OTHER | Age: 50
End: 2025-08-11